# Patient Record
Sex: MALE | Race: WHITE | NOT HISPANIC OR LATINO | Employment: OTHER | URBAN - METROPOLITAN AREA
[De-identification: names, ages, dates, MRNs, and addresses within clinical notes are randomized per-mention and may not be internally consistent; named-entity substitution may affect disease eponyms.]

---

## 2017-01-23 ENCOUNTER — ALLSCRIPTS OFFICE VISIT (OUTPATIENT)
Dept: OTHER | Facility: OTHER | Age: 82
End: 2017-01-23

## 2017-04-12 ENCOUNTER — GENERIC CONVERSION - ENCOUNTER (OUTPATIENT)
Dept: OTHER | Facility: OTHER | Age: 82
End: 2017-04-12

## 2017-06-08 ENCOUNTER — ALLSCRIPTS OFFICE VISIT (OUTPATIENT)
Dept: OTHER | Facility: OTHER | Age: 82
End: 2017-06-08

## 2017-07-20 ENCOUNTER — ALLSCRIPTS OFFICE VISIT (OUTPATIENT)
Dept: OTHER | Facility: OTHER | Age: 82
End: 2017-07-20

## 2017-08-10 ENCOUNTER — ALLSCRIPTS OFFICE VISIT (OUTPATIENT)
Dept: OTHER | Facility: OTHER | Age: 82
End: 2017-08-10

## 2017-08-10 DIAGNOSIS — I27.29 OTHER SECONDARY PULMONARY HYPERTENSION (HCC): ICD-10-CM

## 2017-08-10 DIAGNOSIS — E78.00 PURE HYPERCHOLESTEROLEMIA: ICD-10-CM

## 2017-08-11 ENCOUNTER — GENERIC CONVERSION - ENCOUNTER (OUTPATIENT)
Dept: OTHER | Facility: OTHER | Age: 82
End: 2017-08-11

## 2017-08-11 ENCOUNTER — TRANSCRIBE ORDERS (OUTPATIENT)
Dept: ADMINISTRATIVE | Facility: HOSPITAL | Age: 82
End: 2017-08-11

## 2017-08-11 ENCOUNTER — APPOINTMENT (OUTPATIENT)
Dept: LAB | Facility: HOSPITAL | Age: 82
End: 2017-08-11
Attending: INTERNAL MEDICINE
Payer: MEDICARE

## 2017-08-11 DIAGNOSIS — I27.29 OTHER SECONDARY PULMONARY HYPERTENSION (HCC): ICD-10-CM

## 2017-08-11 DIAGNOSIS — E78.00 PURE HYPERCHOLESTEROLEMIA: ICD-10-CM

## 2017-08-11 LAB
ALBUMIN SERPL BCP-MCNC: 4 G/DL (ref 3.5–5)
ALP SERPL-CCNC: 55 U/L (ref 46–116)
ALT SERPL W P-5'-P-CCNC: 23 U/L (ref 12–78)
ANION GAP SERPL CALCULATED.3IONS-SCNC: 7 MMOL/L (ref 4–13)
AST SERPL W P-5'-P-CCNC: 16 U/L (ref 5–45)
BILIRUB SERPL-MCNC: 0.8 MG/DL (ref 0.2–1)
BUN SERPL-MCNC: 25 MG/DL (ref 5–25)
CALCIUM SERPL-MCNC: 8.8 MG/DL (ref 8.3–10.1)
CHLORIDE SERPL-SCNC: 102 MMOL/L (ref 100–108)
CHOLEST SERPL-MCNC: 174 MG/DL (ref 50–200)
CO2 SERPL-SCNC: 29 MMOL/L (ref 21–32)
CREAT SERPL-MCNC: 1.16 MG/DL (ref 0.6–1.3)
GFR SERPL CREATININE-BSD FRML MDRD: 56 ML/MIN/1.73SQ M
GLUCOSE P FAST SERPL-MCNC: 92 MG/DL (ref 65–99)
HDLC SERPL-MCNC: 39 MG/DL (ref 40–60)
LDLC SERPL CALC-MCNC: 121 MG/DL (ref 0–100)
POTASSIUM SERPL-SCNC: 5.1 MMOL/L (ref 3.5–5.3)
PROT SERPL-MCNC: 7.2 G/DL (ref 6.4–8.2)
SODIUM SERPL-SCNC: 138 MMOL/L (ref 136–145)
TRIGL SERPL-MCNC: 69 MG/DL

## 2017-08-11 PROCEDURE — 80053 COMPREHEN METABOLIC PANEL: CPT

## 2017-08-11 PROCEDURE — 36415 COLL VENOUS BLD VENIPUNCTURE: CPT

## 2017-08-11 PROCEDURE — 80061 LIPID PANEL: CPT

## 2017-09-11 ENCOUNTER — ALLSCRIPTS OFFICE VISIT (OUTPATIENT)
Dept: OTHER | Facility: OTHER | Age: 82
End: 2017-09-11

## 2017-09-20 DIAGNOSIS — E78.00 PURE HYPERCHOLESTEROLEMIA: ICD-10-CM

## 2017-11-16 ENCOUNTER — ALLSCRIPTS OFFICE VISIT (OUTPATIENT)
Dept: OTHER | Facility: OTHER | Age: 82
End: 2017-11-16

## 2017-12-12 ENCOUNTER — GENERIC CONVERSION - ENCOUNTER (OUTPATIENT)
Dept: OTHER | Facility: OTHER | Age: 82
End: 2017-12-12

## 2017-12-12 ENCOUNTER — ALLSCRIPTS OFFICE VISIT (OUTPATIENT)
Dept: OTHER | Facility: OTHER | Age: 82
End: 2017-12-12

## 2018-01-12 VITALS
TEMPERATURE: 94.4 F | BODY MASS INDEX: 25.43 KG/M2 | OXYGEN SATURATION: 92 % | DIASTOLIC BLOOD PRESSURE: 72 MMHG | HEIGHT: 74 IN | HEART RATE: 73 BPM | SYSTOLIC BLOOD PRESSURE: 124 MMHG | WEIGHT: 198.13 LBS

## 2018-01-14 VITALS
HEART RATE: 61 BPM | WEIGHT: 196 LBS | DIASTOLIC BLOOD PRESSURE: 70 MMHG | BODY MASS INDEX: 25.15 KG/M2 | HEIGHT: 74 IN | SYSTOLIC BLOOD PRESSURE: 130 MMHG

## 2018-01-14 VITALS
WEIGHT: 197.13 LBS | BODY MASS INDEX: 25.3 KG/M2 | HEART RATE: 79 BPM | OXYGEN SATURATION: 98 % | DIASTOLIC BLOOD PRESSURE: 72 MMHG | TEMPERATURE: 96.4 F | SYSTOLIC BLOOD PRESSURE: 120 MMHG | HEIGHT: 74 IN

## 2018-01-14 VITALS
HEIGHT: 74 IN | DIASTOLIC BLOOD PRESSURE: 78 MMHG | SYSTOLIC BLOOD PRESSURE: 130 MMHG | HEART RATE: 60 BPM | WEIGHT: 201.13 LBS | BODY MASS INDEX: 25.81 KG/M2
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
## 2018-01-15 NOTE — RESULT NOTES
Verified Results  (1) LIPID PANEL FASTING W DIRECT LDL REFLEX [id number] 08:45AM Baljit Corado   TW Order Number: [id number]     Test Name Result Flag Reference   CHOLESTEROL 174 mg/dL     LDL CHOLESTEROL CALCULATED 121 mg/dL H 0-100   Triglyceride:        Normal ??? ??? ??? ??? ??? ??? ??? <150 mg/dl   ??? ??? ???Borderline High ??? ??? 150-199 mg/dl   ??? ??? ? ?? High ??? ??? ??? ??? ??? ??? ??? 200-499 mg/dl   ??? ??? ? ??Very High ??? ??? ??? ??? ??? >499 mg/dl      Cholesterol:       Desirable ??? ??? ??? ??? <200 mg/dl   ??? ??? Borderline High ??? 200-239 mg/dl   ??? ??? High ??? ??? ??? ??? ??? ??? >239 mg/dl      HDL Cholesterol:       High ??? ???>59 mg/dL   ??? ??? Low ??? ??? <41 mg/dL      HDL Cholesterol:       High ??? ???>59 mg/dL   ??? ??? Low ??? ??? <41 mg/dL      This screening LDL is a calculated result  It does not have the accuracy of the Direct Measured LDL in the monitoring of patients with hyperlipidemia and/or statin therapy  Direct Measure LDL (TTU742) must be ordered separately in these patients  TRIGLYCERIDES 69 mg/dL  <=150   Specimen collection should occur prior to N-Acetylcysteine or Metamizole administration due to the potential for falsely depressed results  HDL,DIRECT 39 mg/dL L 40-60   Specimen collection should occur prior to Metamizole administration due to the potential for falsley depressed results

## 2018-01-16 NOTE — MISCELLANEOUS
Provider Comments  Provider Comments:   no show for appt      Signatures   Electronically signed by : Theodore Linares DO;  Apr 12 2017  9:43AM EST                       (Author)
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
## 2018-03-07 NOTE — PROGRESS NOTES
"  Discussion/Summary  Normal device function      Results/Data  Cardiac Device In Clinic [id number] 01:44PM [place]     Test Name Result Flag Reference   MISCELLANEOUS COMMENT (Report)     DEVICE INTERROGATED IN [facility]: BATTERY VOLTAGE ADEQUATE (6 8 YR)  AP 0%  > 99% (>40%/DEPENDENT)  ALL AVAILABLE LEAD PARAMETERS WITHIN NORMAL LIMITS  NO SIGNIFICANT HIGH RATE EPISODES  MODE REPROGRAMMED TO VVIR FOR CHRONIC AF   NO OTHER PROGRAMMING CHANGES MADE TO DEVICE PARAMETERS  NORMAL PACEMAKER FUNCTION  RG   Cardiac Electrophysiology Report      wkihmwflvti[id number](R)-[id number]  pdf   DEVICE TYPE Pacemaker       Cardiac Electrophysiology Report [id number] 01:44PM [facility] [place]     Test Name Result Flag Reference   Cardiac Electrophysiology Report      ymgwgnqpjvuhqe[id number]  pdf     Signatures   Electronically signed by : Connor Pineda, ; Jun 8 2017 10:40AM EST                       (Author)    Electronically signed by : YAYA Sauceda ; Jun 8 2017 12:56PM EST                       (Author)    "

## 2018-03-07 NOTE — PROGRESS NOTES
"  Discussion/Summary  Normal device function      Results/Data  Results   Cardiac Device In Clinic [id number] 08:56PM Neita [facility]     Test Name Result Flag Reference   MISCELLANEOUS COMMENT (Report)     DEVICE INTERROGATED IN [facility]: **NONBILLABLE** REPROGRAMMING ONLY - RATE RESPONSE TURNED "ON" FROM PASSIVE (DDDR/DDIR) PER DR CHAMBERS (NO TESTING); PT SEEN IN OFFICE TODAY BY DR CHAMBERS ; [id number] VOLTAGE ADEQUATE (7 8 YRS); AP = 8 6%,  = 94%; ATRIAL FIBRILLATION NOTED - PT REFUSES ANTICOAGULATION - TAKES  MG, KRILL OIL, METOPROLOL ; TOTAL AT/AF BURDEN @ 22%; ALL LEAD PARAMETERS APPEAR WITHIN NORMAL LIMITS; NORMAL DEVICE FUNCTION  eb   Cardiac Electrophysiology Report      mldrgtqonqp[id number](R)--[id number]  pdf   DEVICE TYPE Pacemaker       Cardiac Electrophysiology Report [id number] 08:56PM [facility]     Test Name Result Flag Reference   Cardiac Electrophysiology Report      nrhhoetjfpzuvjdpnlcmptqzbg8qgsm0t81wa9v27e2l15sx[id number]  pdf     Signatures   Electronically signed by : Whitney Bales, ; Eric 15 2016  8:36AM EST                       (Author)    Electronically signed by : Blossom Bajwa DO; Jan 17 2016  3:18PM EST                       (Author)    "

## 2018-03-07 NOTE — PROGRESS NOTES
"  Discussion/Summary  Normal device function     Persistent A fib, all V paced  Results/Data  Cardiac Device Remote 31Oct2016 06:51AM Arty Cutter     Test Name Result Flag Reference   MISCELLANEOUS COMMENT      [facility] TRANSMISSION: BATTERY STATUS "OK"  AP 0%  94%  100% IN MODE SWITCH  ALL AVAILABLE LEAD PARAMETERS WITHIN NORMAL LIMITS  NORMAL DEVICE FUNCTION  NC   Cardiac Electrophysiology Report      vlqrvziaetzayfgvjxhlsltdri4hfdr2n88av7[id number]  pdf   DEVICE TYPE Pacemaker       Cardiac Electrophysiology Report [id number] 06:51AM Arty Cutter     Test Name Result Flag Reference   Cardiac Electrophysiology Report      ibrvydfasax[id number]  pdf     Signatures   Electronically signed by : Rudi Vyas, ; Nov 8 2016 10:04AM EST                       (Author)    Electronically signed by : YAYA Godinez ; Nov 11 2016  1:27PM EST                       (Author)    "

## 2018-03-07 NOTE — PROGRESS NOTES
"  Results/Data  Results   Cardiac Device Remote [id number] 07:15AM Vinayak Soler     Test Name Result Flag Reference   MISCELLANEOUS COMMENT (Report)     [facility] TRANSMISSION: BATTERY VOLTAGE ADEQUATE  (7 1 YRS)  NO SIGNIFICANT HIGH RATE EPISODES  100% BURDEN  PATIENT IS NOT ON ANTICOAGULANTS (REFUSES)  ONLY ON ASA  ALL AVAILABLE LEAD PARAMETERS WITHIN NORMAL LIMITS  AP 0%  97%  NORMAL DEVICE FUNCTION  ---BALES   Cardiac Electrophysiology Report      lnewgkinkzqqehnvuo[id number]  pdf   DEVICE TYPE Pacemaker       Cardiac Electrophysiology Report [id number] 07:15AM Vinayak Soler     Test Name Result Flag Reference   Cardiac Electrophysiology Report      dvmrrfzkq[id number] pdf     Signatures   Electronically signed by : Carrie Guajardo, ; Jan 28 2016  9:18AM EST                       (Author)    Electronically signed by : Jaime Moss DO; Jan 30 2016  8:09PM EST                       (Author)    "

## 2018-03-07 NOTE — PROGRESS NOTES
"  Discussion/Summary  Normal device function      Results/Data  Cardiac Device Remote [id number] 06:19AM Kathi Nicholson     Test Name Result Flag Reference   MISCELLANEOUS COMMENT (Report)     [facility] TRANSMISSION: BATTERY VOLTAGE ADEQUATE (7 5-8 5 YRS)  AP-0%, -94% (DEPENDENT/AV BLOCK)  NO SIGNIFICANT HIGH RATE EPISODES  PT IN AF & HAS REFUSED FULL ANTICOAGULATION  PT ON ASA 325MG  AF BURDEN>99% SINCE 4/28/16  ALL AVAILABLE LEAD PARAMETERS WITHIN NORMAL LIMITS  NORMAL DEVICE FUNCTION  GV   Cardiac Electrophysiology Report      xygyifruz[id number]  pdf   DEVICE TYPE Pacemaker       Cardiac Electrophysiology Report [id number] 06:19AM Kathi Nicholson     Test Name Result Flag Reference   Cardiac Electrophysiology Report      [id number]  pdf     Signatures   Electronically signed by : Fozia Arrington RN; Aug  1 2016  1:58PM EST                       (Author)    Electronically signed by : Jose Manuel Aguilar DO; Aug  1 2016  3:47PM EST                       (Author)    "

## 2018-03-07 NOTE — PROGRESS NOTES
"  Discussion/Summary  Normal device function      Results/Data  Results   Cardiac Device In Clinic 28Apr2016 05:33PM Elda Bruno     Test Name Result Flag Reference   MISCELLANEOUS COMMENT (Report)     DEVICE INTERROGATED IN [facility]: BATTERY VOLTAGE ADEQUATE (7 4 YRS)  AP 0%  98%  ALL AVAILABLE LEAD PARAMETERS WITHIN NORMAL LIMITS  1 AMS EPISODE (ONGOING SINCE 1/14/16) = AFIB  PT TAKES 325 MG ASA  REFUSES AC  NO OTHER SIGNIFICANT HIGH RATE EPISODES  NO PROGRAMMING CHANGES MADE TO DEVICE PARAMETERS  PACEMAKER FUNCTIONING APPROPRIATELY  TASKED TO DR Rufino Walls FOR PERSISTENT AFIB  PACEMAKER FUNCTIONING APPROPRIATELY  CP   Cardiac Electrophysiology Report      [id number](R)--[id number]  pdf   DEVICE TYPE Pacemaker       Cardiac Electrophysiology Report [id number] 05:33PM Elda Sanchezroy     Test Name Result Flag Reference   Cardiac Electrophysiology Report      dcmi[id number]  pdf     Signatures   Electronically signed by : Doroteo Lawrence, ; Apr 28 2016  1:48PM EST                       (Author)    Electronically signed by : Graeme Clemente DO;  Apr 30 2016 10:25AM EST                       (Author)    "

## 2018-03-07 NOTE — PROGRESS NOTES
"  Discussion/Summary  Normal device function     Persistent A fib  dependent     refused anticoagulation  Results/Data  Cardiac Device Remote 11Sep2017 06:41AM Sulema First     Test Name Result Flag Reference   MISCELLANEOUS COMMENT (Report)     [facility] TRANSMISSION: BATTERY VOLTAGE ADEQUATE (8 8-9 3 YRS)  -96% (DEPENDENT/AV BLOCK)  ALL AVAILABLE LEAD PARAMETERS WITHIN NORMAL LIMITS  NO SIGNIFICANT HIGH RATE EPISODES  PT IN AF & HAS REFUSED FULL ANTICOAGULATION  PT ON ASA 325MG  NORMAL DEVICE FUNCTION  [facility]   Cardiac Electrophysiology Report      [id number]  pdf   DEVICE TYPE Pacemaker       Cardiac Electrophysiology Report [id number] 06:41AM Sulema First     Test Name Result Flag Reference   Cardiac Electrophysiology Report      [id number]  pdf     Signatures   Electronically signed by : Yamilka Oleary RN; Sep 13 2017  3:34PM EST                       (Author)    Electronically signed by : YAYA Sehpard ; Sep 17 2017 12:31PM EST                       (Author)    "

## 2018-03-07 NOTE — PROGRESS NOTES
"  Discussion/Summary  Normal device function      Results/Data  Cardiac Device Remote [id number] 07:00AM Quinlianne Bliss     Test Name Result Flag Reference   MISCELLANEOUS COMMENT      [facility] TRANSMISSION: BATTERY STATUS "OK"   96%  ALL AVAILABLE LEAD PARAMETERS WITHIN NORMAL LIMITS  NO SIGNIFICANT HIGH RATE EPISODES  NORMAL DEVICE FUNCTION  NC   Cardiac Electrophysiology Report      [id number]  pdf   DEVICE TYPE Pacemaker       Cardiac Electrophysiology Report [id number] 07:00AM Quinlianne Bliss     Test Name Result Flag Reference   Cardiac Electrophysiology Report      [id number]  pdf     Signatures   Electronically signed by : Deepti Flores, ; Dec 19 2017  1:53PM EST                       (Author)    Electronically signed by : YAYA Wade ; Dec 19 2017  7:00PM EST                       (Author)    "

## 2018-03-13 ENCOUNTER — CLINICAL SUPPORT (OUTPATIENT)
Dept: CARDIOLOGY CLINIC | Facility: CLINIC | Age: 83
End: 2018-03-13
Payer: MEDICARE

## 2018-03-13 DIAGNOSIS — I48.19 PERSISTENT ATRIAL FIBRILLATION (HCC): ICD-10-CM

## 2018-03-13 DIAGNOSIS — Z95.0 CARDIAC PACEMAKER IN SITU: ICD-10-CM

## 2018-03-13 DIAGNOSIS — I44.2 ATRIOVENTRICULAR BLOCK, COMPLETE (HCC): Primary | ICD-10-CM

## 2018-03-13 PROCEDURE — 93294 REM INTERROG EVL PM/LDLS PM: CPT | Performed by: INTERNAL MEDICINE

## 2018-03-13 PROCEDURE — 93296 REM INTERROG EVL PM/IDS: CPT | Performed by: INTERNAL MEDICINE

## 2018-03-14 NOTE — PROGRESS NOTES
PM  [facility] TRANSMISSION: BATTERY STATUS "OK"   98%  ALL AVAILABLE LEAD PARAMETERS WITHIN NORMAL LIMITS  NO SIGNIFICANT HIGH RATE EPISODES  NORMAL DEVICE FUNCTION   NC

## 2018-05-17 RX ORDER — BIOTIN 1 MG
1 TABLET ORAL DAILY
COMMUNITY

## 2018-05-17 RX ORDER — TAMSULOSIN HYDROCHLORIDE 0.4 MG/1
0.4 CAPSULE ORAL DAILY
Refills: 3 | COMMUNITY
Start: 2018-03-22 | End: 2019-01-21 | Stop reason: SDUPTHER

## 2018-05-17 RX ORDER — METOPROLOL SUCCINATE 50 MG/1
75 TABLET, EXTENDED RELEASE ORAL DAILY
Refills: 3 | COMMUNITY
Start: 2018-02-25 | End: 2018-08-23 | Stop reason: SDUPTHER

## 2018-05-17 RX ORDER — UBIDECARENONE 75 MG
1 CAPSULE ORAL DAILY
COMMUNITY

## 2018-05-17 RX ORDER — UBIDECARENONE 50 MG
CAPSULE ORAL DAILY
COMMUNITY

## 2018-05-17 RX ORDER — ASPIRIN 325 MG
1 TABLET ORAL DAILY
COMMUNITY

## 2018-05-17 RX ORDER — MULTIVIT WITH MINERALS/LUTEIN
1 TABLET ORAL DAILY
COMMUNITY

## 2018-05-17 RX ORDER — BACLOFEN 10 MG/1
1 TABLET ORAL EVERY 12 HOURS
COMMUNITY
Start: 2018-01-22 | End: 2018-06-22 | Stop reason: CLARIF

## 2018-05-22 ENCOUNTER — OFFICE VISIT (OUTPATIENT)
Dept: INTERNAL MEDICINE CLINIC | Facility: CLINIC | Age: 83
End: 2018-05-22
Payer: MEDICARE

## 2018-05-22 VITALS
TEMPERATURE: 94.8 F | HEART RATE: 59 BPM | OXYGEN SATURATION: 98 % | BODY MASS INDEX: 26.37 KG/M2 | HEIGHT: 74 IN | WEIGHT: 205.5 LBS

## 2018-05-22 DIAGNOSIS — M19.90 ARTHRITIS: ICD-10-CM

## 2018-05-22 DIAGNOSIS — I48.20 CHRONIC ATRIAL FIBRILLATION (HCC): Primary | ICD-10-CM

## 2018-05-22 DIAGNOSIS — H92.01 RIGHT EAR PAIN: ICD-10-CM

## 2018-05-22 PROBLEM — M54.50 LOWER BACK PAIN: Status: ACTIVE | Noted: 2018-01-22

## 2018-05-22 PROCEDURE — 99214 OFFICE O/P EST MOD 30 MIN: CPT | Performed by: INTERNAL MEDICINE

## 2018-05-22 NOTE — PROGRESS NOTES
Assessment/Plan:      Diagnoses and all orders for this visit:    Chronic atrial fibrillation ([address] )  Pt has appt with Dr Jackson Jacob in June - no sxs and remains active at home    Right ear pain  ?sinus fluid but not infected Recommend claritin prn  And increase fluids    Arthritis  Pt eats dried cherries and will use Tylenol prn    Other orders  -     aspirin 325 mg tablet; Take 1 tablet by mouth daily  -     baclofen 10 mg tablet; Take 1 tablet by mouth every 12 (twelve) hours  -     fluocinonide (LIDEX) 0 05 % cream; Apply topically  -     metoprolol succinate (TOPROL-XL) 50 mg 24 hr tablet; Take 75 mg by mouth daily  -     Red Yeast Rice 600 MG TABS; Take by mouth daily  -     tamsulosin (FLOMAX) 0 4 mg; Take 0 4 mg by mouth daily  -     cyanocobalamin (VITAMIN B-12) 100 mcg tablet; Take 1 tablet by mouth daily  -     Ascorbic Acid (VITAMIN C) 1000 MG tablet; Take 1 tablet by mouth daily  -     Cholecalciferol (VITAMIN D3) 1000 units CAPS; Take 1 tablet by mouth daily    Rto 4 months         Patient ID: Kaykay Hidalgo is a 80 y o  male  HPI   Pt doing generally well  He turned 80 recently  He does have right ear pain for past 2 days  No drainage or hearing loss  No falls  No chest pain or sob  Review of Systems   Constitutional: Negative  HENT: Positive for ear pain  Eyes: Negative  Respiratory: Negative  Cardiovascular: Negative  Endocrine: Negative  Genitourinary: Negative  Musculoskeletal: Negative  Skin: Negative  Allergic/Immunologic: Negative  Neurological: Negative  Hematological: Negative  Psychiatric/Behavioral: Negative  Vitals:    05/22/18 0816   Pulse: 59   Temp: (!) 94 8 °F (34 9 °C)   TempSrc: Tympanic   SpO2: 98%   Weight: 93 2 kg (205 lb 8 oz)   Height: 6' 2" (1 88 m)   /70       Physical Exam   Constitutional: He is oriented to person, place, and time  He appears well-developed and well-nourished  No distress     HENT:   Head: Normocephalic and atraumatic  Right Ear: External ear normal    Left Ear: External ear normal    Nose: Nose normal    Mouth/Throat: Oropharynx is clear and moist  No oropharyngeal exudate  Eyes: Conjunctivae and EOM are normal  Pupils are equal, round, and reactive to light  No scleral icterus  Neck: Normal range of motion  Neck supple  No JVD present  Cardiovascular: Normal rate, normal heart sounds and intact distal pulses  irreg irreg   Pulmonary/Chest: Effort normal and breath sounds normal  No respiratory distress  He has no wheezes  He has no rales  He exhibits no tenderness  Abdominal: Soft  Bowel sounds are normal  He exhibits no distension and no mass  There is no tenderness  There is no rebound and no guarding  Musculoskeletal: Normal range of motion  He exhibits no edema, tenderness or deformity  Lymphadenopathy:     He has no cervical adenopathy  Neurological: He is alert and oriented to person, place, and time  He has normal reflexes  He displays normal reflexes  No cranial nerve deficit  He exhibits normal muscle tone  Coordination normal    Skin: Skin is warm and dry  No rash noted  He is not diaphoretic  No erythema  No pallor  Psychiatric: He has a normal mood and affect  His behavior is normal  Judgment and thought content normal    Nursing note and vitals reviewed

## 2018-06-15 ENCOUNTER — REMOTE DEVICE CLINIC VISIT (OUTPATIENT)
Dept: CARDIOLOGY CLINIC | Facility: CLINIC | Age: 83
End: 2018-06-15
Payer: MEDICARE

## 2018-06-15 DIAGNOSIS — I44.2 AV BLOCK, COMPLETE (HCC): Primary | ICD-10-CM

## 2018-06-15 DIAGNOSIS — I48.19 PERSISTENT ATRIAL FIBRILLATION (HCC): ICD-10-CM

## 2018-06-15 DIAGNOSIS — Z95.0 CARDIAC PACEMAKER IN SITU: ICD-10-CM

## 2018-06-15 PROCEDURE — 93294 REM INTERROG EVL PM/LDLS PM: CPT

## 2018-06-15 PROCEDURE — 93296 REM INTERROG EVL PM/IDS: CPT

## 2018-06-18 NOTE — PROGRESS NOTES
Results for orders placed or performed in visit on 06/15/18   Cardiac EP device report    Narrative    [facility] DUAL PM  MERLIN TRANSMISSION: BATTERY VOLTAGE ADEQUATE (7 7-8 2 YRS)  -96% (DEPENDENT/AV BLOCK)  ALL AVAILABLE LEAD PARAMETERS WITHIN NORMAL LIMITS  NO SIGNIFICANT HIGH RATE EPISODES  NORMAL DEVICE FUNCTION   GV

## 2018-06-22 ENCOUNTER — OFFICE VISIT (OUTPATIENT)
Dept: CARDIOLOGY CLINIC | Facility: HOSPITAL | Age: 83
End: 2018-06-22
Payer: MEDICARE

## 2018-06-22 VITALS
DIASTOLIC BLOOD PRESSURE: 68 MMHG | HEART RATE: 60 BPM | WEIGHT: 200.2 LBS | SYSTOLIC BLOOD PRESSURE: 150 MMHG | BODY MASS INDEX: 25.69 KG/M2 | HEIGHT: 74 IN

## 2018-06-22 DIAGNOSIS — I48.21 PERMANENT ATRIAL FIBRILLATION (HCC): ICD-10-CM

## 2018-06-22 DIAGNOSIS — I47.2 PAROXYSMAL VENTRICULAR TACHYCARDIA (HCC): Primary | ICD-10-CM

## 2018-06-22 DIAGNOSIS — E78.00 HYPERCHOLESTEROLEMIA: ICD-10-CM

## 2018-06-22 PROCEDURE — 99213 OFFICE O/P EST LOW 20 MIN: CPT | Performed by: INTERNAL MEDICINE

## 2018-06-22 PROCEDURE — 93000 ELECTROCARDIOGRAM COMPLETE: CPT | Performed by: INTERNAL MEDICINE

## 2018-06-22 NOTE — PROGRESS NOTES
Cardiology Follow Up    Patricia Watson  5/14/1928  [id number]  [address] [facility]  [address] [place] [address] [address]    No diagnosis found  I had the pleasure of seeing Patricia Watson for a follow up visit  Interval History: none    History of the presenting illness, Discussion/Summary and My Plan are as follows:    He is a pleasant 80 yr old male with a permanent pacemaker ([address] PPM)-  implantation in October 2013 for sinus rhythm with complete heart block  ( 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)  After discharge he has done well without any symptoms  His pacemaker interrogation revealed episodes of rate controlled paroxysmal atrial fibrillation and subsequently Non-sustained Ventricular Tachycardia that subsided on beta-blockade  I also initiated him on a full dose aspirin a day  His chads 2 score is one for age  His CHADSVASC score would be 2  He is functional gentleman leading an active life and might benefit from more aggressive anticoagulation with Coumadin or one of the newer agents  This was discussed with him in detail at every visit  However, he would like to take ASA alone  He also discontinued his simvastatin and is now taking Red Yeast Rice  Remains active without symptoms    Plan:    Status post dual-chamber pacemaker - [employer] device: Last interrogation with normal function - March 2018, VVI now  No recent high rate episodes  Episodes of AFib with heart rate control has been good  Heart rate did not  with exercise (during stress test) rate responsiveness now at medium  Remains very asymptomatic at very good levels of exertion  Atrial fibrillation: Continue metoprolol continue high-dose aspirin  Does not want any further aggressive anticoagulation Either with Coumadin or any of the newer anticoagulants    Discussed with him   Rates have been controlled  Dyslipidemia: last LDL of 116 in October 2016, He was on simvastatin-he now change this to red yeast rice  Check lipids  Non-sustained Ventricular tachycardia: Neg echo and stres test for ischemia  Increased Metoprolol to 75 mg a day, Remains completely asymptomatic  No recent episodes on most recent Pacemaker interrogation    I will see him in followup in 9 months  Patient Active Problem List   Diagnosis    Arthritis    Atrial fibrillation ([address] )    Benign prostatic hyperplasia    Diverticulosis    Hypercholesterolemia    Lower back pain    Paroxysmal ventricular tachycardia (HCC)    Pulmonary artery hypertension (HCC)    RBBB    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    Right ear pain     Past Medical History:   Diagnosis Date    Arthritis     Atrial fibrillation (HCC)     Last Assessed:8/10/17    Benign prostatic hyperplasia     Last Assessed:1/28/14    Complete atrioventricular block ([address] )     Diverticulosis     Last Assessed:4/30/13    Hypercholesterolemia     Paroxysmal ventricular tachycardia (HCC)     Last Assessed:7/20/17    Prostatitis     Last Assessed:12/19/12    Pulmonary artery hypertension (HCC)     mild pulmonary htn    Right bundle branch block     Last Assessed:11/26/13    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     Last IXOZCDTFW:81/3/41     Social History     Social History    Marital status:       Spouse name: N/A    Number of children: N/A    Years of education: N/A     Occupational History    retired      Social History Main Topics    Smoking status: Former Smoker    Smokeless tobacco: Never Used    Alcohol use Yes      Comment: social drinker    Drug use: No    Sexual activity: Not on file     Other Topics Concern    Not on file     Social History Narrative    Advanced directive discussed with patient    Always uses seatbelt    Always uses sunscreen    Caffeine use    Regular dental care          Family History   Problem Relation Age of Onset    Hypertension Mother     Stroke Mother         Stroke Syndrome    Other Sister         pacemaker placement    Prostate cancer Brother     Diabetes Family      Past Surgical History:   Procedure Laterality Date    CARDIAC PACEMAKER PLACEMENT      [employer] PPM    COLONOSCOPY      HEMORRHOID SURGERY      Cauterization of hemorrhoids    HERNIA REPAIR      INCISION AND DRAINAGE ABSCESS / HEMATOMA OF Casey Zamora / [place] [employer] / [address]      Fatty Tumor Removed    KNEE SURGERY Left     Steel Removes from left knee       Current Outpatient Prescriptions:     Ascorbic Acid (VITAMIN C) 1000 MG tablet, Take 1 tablet by mouth daily, Disp: , Rfl:     aspirin 325 mg tablet, Take 1 tablet by mouth daily, Disp: , Rfl:     Cholecalciferol (VITAMIN D3) 1000 units CAPS, Take 1 tablet by mouth daily, Disp: , Rfl:     cyanocobalamin (VITAMIN B-12) 100 mcg tablet, Take 1 tablet by mouth daily, Disp: , Rfl:     fluocinonide (LIDEX) 0 05 % cream, Apply topically, Disp: , Rfl:     metoprolol succinate (TOPROL-XL) 50 mg 24 hr tablet, Take 75 mg by mouth daily, Disp: , Rfl: 3    Red Yeast Rice 600 MG TABS, Take by mouth daily, Disp: , Rfl:     tamsulosin (FLOMAX) 0 4 mg, Take 0 4 mg by mouth daily, Disp: , Rfl: 3  Allergies   Allergen Reactions    Ibuprofen Hives       Labs:not applicable  Imaging: No results found      Review of Systems:  Review of Systems    Physical Exam:  Physical Exam

## 2018-07-02 ENCOUNTER — OFFICE VISIT (OUTPATIENT)
Dept: URGENT CARE | Facility: CLINIC | Age: 83
End: 2018-07-02
Payer: MEDICARE

## 2018-07-02 VITALS
WEIGHT: 200 LBS | OXYGEN SATURATION: 97 % | RESPIRATION RATE: 20 BRPM | TEMPERATURE: 98 F | HEART RATE: 84 BPM | DIASTOLIC BLOOD PRESSURE: 60 MMHG | BODY MASS INDEX: 27.09 KG/M2 | HEIGHT: 72 IN | SYSTOLIC BLOOD PRESSURE: 118 MMHG

## 2018-07-02 DIAGNOSIS — J20.9 ACUTE BRONCHITIS, UNSPECIFIED ORGANISM: Primary | ICD-10-CM

## 2018-07-02 PROCEDURE — G0463 HOSPITAL OUTPT CLINIC VISIT: HCPCS | Performed by: PHYSICIAN ASSISTANT

## 2018-07-02 PROCEDURE — 99203 OFFICE O/P NEW LOW 30 MIN: CPT | Performed by: PHYSICIAN ASSISTANT

## 2018-07-02 RX ORDER — AZITHROMYCIN 250 MG/1
TABLET, FILM COATED ORAL
Qty: 6 TABLET | Refills: 0 | Status: SHIPPED | OUTPATIENT
Start: 2018-07-02 | End: 2018-07-06

## 2018-07-02 NOTE — PATIENT INSTRUCTIONS

## 2018-07-02 NOTE — PROGRESS NOTES
[facility]        NAME: Char Gonzales is a 80 y o  male  : 1928    MRN: [id number]  DATE: 2018  TIME: 8:21 AM    Assessment and Plan   Acute bronchitis, unspecified organism [J20 9]  1  Acute bronchitis, unspecified organism  azithromycin (ZITHROMAX) 250 mg tablet         Patient Instructions       Follow up with PCP in 3-5 days  Proceed to  ER if symptoms worsen  Chief Complaint     Chief Complaint   Patient presents with    Cough     Productive cough for 1 week         History of Present Illness       Patient presents with a productive cough for the past week  He states the mucus is yellow in color it is not associated any chest pain shortness of breath dyspnea on exertion fever or chills  He denies any sinus symptoms or allergies  He states he feels fine other than the productive cough  Review of Systems   Review of Systems   Constitutional: Negative for chills and fever  HENT: Negative for congestion, ear pain, postnasal drip, sinus pressure, sore throat and tinnitus  Eyes: Negative for redness  Respiratory: Positive for cough  Negative for chest tightness, shortness of breath and wheezing  Cardiovascular: Negative for chest pain and palpitations  Gastrointestinal: Negative for abdominal pain, diarrhea and nausea  Musculoskeletal: Negative for myalgias  Skin: Negative for rash  Neurological: Negative for dizziness  Hematological: Negative for adenopathy           Current Medications       Current Outpatient Prescriptions:     Ascorbic Acid (VITAMIN C) 1000 MG tablet, Take 1 tablet by mouth daily, Disp: , Rfl:     aspirin 325 mg tablet, Take 1 tablet by mouth daily, Disp: , Rfl:     azithromycin (ZITHROMAX) 250 mg tablet, Take 2 tablets today then 1 tablet daily x 4 days, Disp: 6 tablet, Rfl: 0    Cholecalciferol (VITAMIN D3) 1000 units CAPS, Take 1 tablet by mouth daily, Disp: , Rfl:     cyanocobalamin (VITAMIN B-12) 100 mcg tablet, Take 1 tablet by mouth daily, Disp: , Rfl:     metoprolol succinate (TOPROL-XL) 50 mg 24 hr tablet, Take 75 mg by mouth daily, Disp: , Rfl: 3    Red Yeast Rice 600 MG TABS, Take by mouth daily, Disp: , Rfl:     tamsulosin (FLOMAX) 0 4 mg, Take 0 4 mg by mouth daily, Disp: , Rfl: 3    Current Allergies     Allergies as of 07/02/2018 - Reviewed 07/02/2018   Allergen Reaction Noted    Ibuprofen Hives 07/09/2012            The following portions of the patient's history were reviewed and updated as appropriate: allergies, current medications, past family history, past medical history, past social history, past surgical history and problem list      Past Medical History:   Diagnosis Date    Arthritis     Atrial fibrillation (HCC)     Last Assessed:8/10/17    Benign prostatic hyperplasia     Last Assessed:1/28/14    BPH (benign prostatic hyperplasia)     Complete atrioventricular block ([address] )     Diverticulosis     Last Assessed:4/30/13    Hypercholesterolemia     Hypertension     Paroxysmal ventricular tachycardia (HCC)     Last Assessed:7/20/17    Prostatitis     Last Assessed:12/19/12    Pulmonary artery hypertension (HCC)     mild pulmonary htn    Right bundle branch block     Last Assessed:11/26/13    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     Last KBJUJQSUK:70/0/55       Past Surgical History:   Procedure Laterality Date    CARDIAC PACEMAKER PLACEMENT      [facility] PPM    COLONOSCOPY      HEMORRHOID SURGERY      Cauterization of hemorrhoids    HERNIA REPAIR      INCISION AND DRAINAGE ABSCESS / HEMATOMA OF Olga Heckler / [place] / THIGH      Fatty Tumor Removed    KNEE SURGERY Left     Steel Removes from left knee       Family History   Problem Relation Age of Onset    Hypertension Mother     Stroke Mother         Stroke Syndrome    Other Sister         pacemaker placement    Prostate cancer Brother     Diabetes Family          Medications have been verified          Objective   /60   Pulse 84   Temp 98 °F (36 7 °C) (Tympanic)   Resp 20   Ht 6' (1 829 m)   Wt 90 7 kg (200 lb)   SpO2 97%   BMI 27 12 kg/m²        Physical Exam     Physical Exam   Constitutional: He is oriented to person, place, and time  He appears well-developed and well-nourished  HENT:   Head: Normocephalic and atraumatic  Right Ear: External ear normal    Left Ear: External ear normal    Nose: Nose normal    Mouth/Throat: Oropharynx is clear and moist    Eyes: Conjunctivae are normal    Neck: Neck supple  Cardiovascular: Normal rate, regular rhythm and normal heart sounds  Pulmonary/Chest: Effort normal and breath sounds normal    Lymphadenopathy:     He has no cervical adenopathy  Neurological: He is alert and oriented to person, place, and time  Skin: Skin is warm and dry  No rash noted  Psychiatric: He has a normal mood and affect  His behavior is normal  Judgment and thought content normal    Nursing note and vitals reviewed

## 2018-08-23 ENCOUNTER — IN-CLINIC DEVICE VISIT (OUTPATIENT)
Dept: CARDIOLOGY CLINIC | Facility: HOSPITAL | Age: 83
End: 2018-08-23
Payer: MEDICARE

## 2018-08-23 DIAGNOSIS — I44.30 AVB (ATRIOVENTRICULAR BLOCK): ICD-10-CM

## 2018-08-23 DIAGNOSIS — I10 ESSENTIAL HYPERTENSION: Primary | ICD-10-CM

## 2018-08-23 DIAGNOSIS — Z95.0 PRESENCE OF CARDIAC PACEMAKER: ICD-10-CM

## 2018-08-23 DIAGNOSIS — I48.21 PERMANENT ATRIAL FIBRILLATION (HCC): Primary | ICD-10-CM

## 2018-08-23 PROCEDURE — 93279 PRGRMG DEV EVAL PM/LDLS PM: CPT | Performed by: INTERNAL MEDICINE

## 2018-08-23 RX ORDER — METOPROLOL SUCCINATE 50 MG/1
TABLET, EXTENDED RELEASE ORAL
Qty: 135 TABLET | Refills: 3 | Status: SHIPPED | OUTPATIENT
Start: 2018-08-23 | End: 2019-08-11 | Stop reason: SDUPTHER

## 2018-08-23 NOTE — PROGRESS NOTES
Results for orders placed or performed in visit on 08/23/18   Cardiac EP device report    Narrative    [facility] DUAL PM  DEVICE INTERROGATED IN THE [facility]: BATTERY VOLTAGE ADEQUATE (7 8 YRS)  : 97%  ALL LEAD PARAMETERS WITHIN NORMAL LIMITS  NO SIGNIFICANT HIGH RATE EPISODES  NO PROGRAMMING CHANGES MADE TO DEVICE PARAMETERS  PACEMAKER FUNCTIONING APPROPRIATELY    [address] [place] [address]

## 2018-09-26 ENCOUNTER — OFFICE VISIT (OUTPATIENT)
Dept: INTERNAL MEDICINE CLINIC | Facility: CLINIC | Age: 83
End: 2018-09-26
Payer: MEDICARE

## 2018-09-26 VITALS
HEIGHT: 72 IN | BODY MASS INDEX: 25.94 KG/M2 | WEIGHT: 191.5 LBS | HEART RATE: 49 BPM | OXYGEN SATURATION: 95 % | TEMPERATURE: 95.7 F

## 2018-09-26 DIAGNOSIS — I27.21 PULMONARY ARTERY HYPERTENSION (HCC): ICD-10-CM

## 2018-09-26 DIAGNOSIS — Z23 NEED FOR VACCINATION WITH 13-POLYVALENT PNEUMOCOCCAL CONJUGATE VACCINE: ICD-10-CM

## 2018-09-26 DIAGNOSIS — M19.90 ARTHRITIS: ICD-10-CM

## 2018-09-26 DIAGNOSIS — Z23 NEED FOR INFLUENZA VACCINATION: ICD-10-CM

## 2018-09-26 DIAGNOSIS — I48.20 CHRONIC ATRIAL FIBRILLATION (HCC): ICD-10-CM

## 2018-09-26 DIAGNOSIS — I10 ESSENTIAL HYPERTENSION: Primary | ICD-10-CM

## 2018-09-26 PROCEDURE — 90670 PCV13 VACCINE IM: CPT

## 2018-09-26 PROCEDURE — G0008 ADMIN INFLUENZA VIRUS VAC: HCPCS

## 2018-09-26 PROCEDURE — 99214 OFFICE O/P EST MOD 30 MIN: CPT | Performed by: INTERNAL MEDICINE

## 2018-09-26 PROCEDURE — 90662 IIV NO PRSV INCREASED AG IM: CPT

## 2018-09-26 PROCEDURE — G0009 ADMIN PNEUMOCOCCAL VACCINE: HCPCS

## 2018-09-26 NOTE — PROGRESS NOTES
Assessment/Plan:         Diagnoses and all orders for this visit:    Need for vaccination with 13-polyvalent pneumococcal conjugate vaccine  -     PNEUMOCOCCAL CONJUGATE VACCINE 13-VALENT GREATER THAN 6 MONTHS    Need for influenza vaccination  -     influenza vaccine, 2022-3254, high-dose, PF 0 5 mL, for patients 65 yr+ (FLUZONE HIGH-DOSE)           Patient ID: Barb Teague is a 80 y o  male  HPI   Pt doing generally overall  No falls  No chest pain or sob  No dizziness or lightheadedness  Appetite good  He turned 80 recently  Wants flu shot and prevnar today  No complaints  Cardio due in spring of next year  The following portions of the patient's history were reviewed and updated as appropriate:   Past Medical History:   Diagnosis Date    Arthritis     Atrial fibrillation (HCC)     Last Assessed:8/10/17    Benign prostatic hyperplasia     Last Assessed:1/28/14    BPH (benign prostatic hyperplasia)     Complete atrioventricular block (HCC)     Diverticulosis     Last Assessed:4/30/13    Hypercholesterolemia     Hypertension     Paroxysmal ventricular tachycardia (HCC)     Last Assessed:7/20/17    Prostatitis     Last Assessed:12/19/12    Pulmonary artery hypertension (HCC)     mild pulmonary htn    Right bundle branch block     Last Assessed:11/26/13    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     Last HFOXRJHVK:81/0/28     Past Surgical History:   Procedure Laterality Date    CARDIAC PACEMAKER PLACEMENT      St  [facility] DC PPM    COLONOSCOPY      HEMORRHOID SURGERY      Cauterization of hemorrhoids    HERNIA REPAIR      INCISION AND DRAINAGE ABSCESS / HEMATOMA OF [place] / KNEE / [address] [place] [address]      Fatty Tumor Removed    KNEE SURGERY Left     Steel Removes from left knee     Allergies   Allergen Reactions    Ibuprofen Hives     Social History     Social History    Marital status:       Spouse name: N/A    Number of children: N/A    Years of education: N/A     Occupational History    retired      Social History Main Topics    Smoking status: Former Smoker    Smokeless tobacco: Never Used    Alcohol use Yes      Comment: social drinker    Drug use: No    Sexual activity: Not on file     Other Topics Concern    Not on file     Social History Narrative    Advanced directive discussed with patient    Always uses seatbelt    Always uses sunscreen    Caffeine use    Regular dental care             Review of Systems   Constitutional: Negative  HENT: Negative  Eyes: Negative  Respiratory: Negative  Cardiovascular: Negative  Gastrointestinal: Negative  Endocrine: Negative  Genitourinary: Negative  Musculoskeletal: Negative  Skin: Negative  Allergic/Immunologic: Negative  Neurological: Negative  Hematological: Negative  Psychiatric/Behavioral: Negative  Pulse (!) 49   Temp (!) 95 7 °F (35 4 °C) (Tympanic)   Ht 6' (1 829 m)   Wt 86 9 kg (191 lb 8 oz)   SpO2 95%   BMI 25 97 kg/m²          Physical Exam   Constitutional: He is oriented to person, place, and time  He appears well-developed and well-nourished  No distress  HENT:   Head: Normocephalic and atraumatic  Right Ear: External ear normal    Left Ear: External ear normal    Nose: Nose normal    Mouth/Throat: Oropharynx is clear and moist  No oropharyngeal exudate  Eyes: Conjunctivae and EOM are normal  Pupils are equal, round, and reactive to light  No scleral icterus  Neck: Normal range of motion  Neck supple  No JVD present  Cardiovascular: Normal rate and intact distal pulses  Murmur heard  irreg irreg   Pulmonary/Chest: Effort normal and breath sounds normal    Abdominal: Soft  Bowel sounds are normal    Musculoskeletal: Normal range of motion  He exhibits deformity  He exhibits no edema or tenderness  Lymphadenopathy:     He has no cervical adenopathy  Neurological: He is alert and oriented to person, place, and time  He has normal reflexes   He displays normal reflexes  No cranial nerve deficit  He exhibits normal muscle tone  Coordination normal    Skin: Skin is warm and dry  He is not diaphoretic  Psychiatric: He has a normal mood and affect  His behavior is normal  Judgment and thought content normal    Nursing note and vitals reviewed

## 2018-11-21 DIAGNOSIS — H66.90 EAR INFECTION: Primary | ICD-10-CM

## 2018-11-27 ENCOUNTER — REMOTE DEVICE CLINIC VISIT (OUTPATIENT)
Dept: CARDIOLOGY CLINIC | Facility: CLINIC | Age: 83
End: 2018-11-27
Payer: MEDICARE

## 2018-11-27 DIAGNOSIS — Z95.0 PRESENCE OF PERMANENT CARDIAC PACEMAKER: ICD-10-CM

## 2018-11-27 DIAGNOSIS — I48.20 CHRONIC ATRIAL FIBRILLATION (HCC): Primary | ICD-10-CM

## 2018-11-27 DIAGNOSIS — I44.2 CHB (COMPLETE HEART BLOCK) (HCC): ICD-10-CM

## 2018-11-27 PROCEDURE — 93294 REM INTERROG EVL PM/LDLS PM: CPT | Performed by: INTERNAL MEDICINE

## 2018-11-27 PROCEDURE — 93296 REM INTERROG EVL PM/IDS: CPT | Performed by: INTERNAL MEDICINE

## 2018-11-27 NOTE — PROGRESS NOTES
[facility] DUAL PM ([address] 60)  MERLIN TRANSMISSION:  BATTERY VOLTAGE ADEQUATE (7 7 YR)     94% (CHB)   ALL LEAD PARAMETERS WITHIN NORMAL LIMITS   NO SIGNIFICANT HIGH RATE EPISODES   NORMAL DEVICE FUNCTION   RG

## 2019-01-16 ENCOUNTER — APPOINTMENT (OUTPATIENT)
Dept: LAB | Facility: HOSPITAL | Age: 84
End: 2019-01-16
Attending: INTERNAL MEDICINE
Payer: MEDICARE

## 2019-01-16 DIAGNOSIS — I10 ESSENTIAL HYPERTENSION: ICD-10-CM

## 2019-01-16 LAB
ALBUMIN SERPL BCP-MCNC: 3.8 G/DL (ref 3.5–5)
ALP SERPL-CCNC: 57 U/L (ref 46–116)
ALT SERPL W P-5'-P-CCNC: 31 U/L (ref 12–78)
ANION GAP SERPL CALCULATED.3IONS-SCNC: 8 MMOL/L (ref 4–13)
AST SERPL W P-5'-P-CCNC: 21 U/L (ref 5–45)
BILIRUB SERPL-MCNC: 0.8 MG/DL (ref 0.2–1)
BUN SERPL-MCNC: 22 MG/DL (ref 5–25)
CALCIUM SERPL-MCNC: 9.3 MG/DL (ref 8.3–10.1)
CHLORIDE SERPL-SCNC: 103 MMOL/L (ref 100–108)
CHOLEST SERPL-MCNC: 183 MG/DL (ref 50–200)
CO2 SERPL-SCNC: 28 MMOL/L (ref 21–32)
CREAT SERPL-MCNC: 1.15 MG/DL (ref 0.6–1.3)
GFR SERPL CREATININE-BSD FRML MDRD: 56 ML/MIN/1.73SQ M
GLUCOSE P FAST SERPL-MCNC: 97 MG/DL (ref 65–99)
HDLC SERPL-MCNC: 43 MG/DL (ref 40–60)
LDLC SERPL CALC-MCNC: 127 MG/DL (ref 0–100)
NONHDLC SERPL-MCNC: 140 MG/DL
POTASSIUM SERPL-SCNC: 4.6 MMOL/L (ref 3.5–5.3)
PROT SERPL-MCNC: 7.1 G/DL (ref 6.4–8.2)
SODIUM SERPL-SCNC: 139 MMOL/L (ref 136–145)
TRIGL SERPL-MCNC: 64 MG/DL

## 2019-01-16 PROCEDURE — 80053 COMPREHEN METABOLIC PANEL: CPT

## 2019-01-16 PROCEDURE — 36415 COLL VENOUS BLD VENIPUNCTURE: CPT

## 2019-01-16 PROCEDURE — 80061 LIPID PANEL: CPT

## 2019-01-21 DIAGNOSIS — N40.0 BENIGN PROSTATIC HYPERPLASIA, UNSPECIFIED WHETHER LOWER URINARY TRACT SYMPTOMS PRESENT: Primary | ICD-10-CM

## 2019-01-21 RX ORDER — TAMSULOSIN HYDROCHLORIDE 0.4 MG/1
0.4 CAPSULE ORAL DAILY
Qty: 90 CAPSULE | Refills: 3 | Status: SHIPPED | OUTPATIENT
Start: 2019-01-21 | End: 2020-01-15

## 2019-02-26 ENCOUNTER — REMOTE DEVICE CLINIC VISIT (OUTPATIENT)
Dept: CARDIOLOGY CLINIC | Facility: CLINIC | Age: 84
End: 2019-02-26
Payer: MEDICARE

## 2019-02-26 DIAGNOSIS — I44.2 CHB (COMPLETE HEART BLOCK) (HCC): ICD-10-CM

## 2019-02-26 DIAGNOSIS — Z95.0 PACEMAKER: Primary | ICD-10-CM

## 2019-02-26 PROCEDURE — 93296 REM INTERROG EVL PM/IDS: CPT | Performed by: INTERNAL MEDICINE

## 2019-02-26 PROCEDURE — 93294 REM INTERROG EVL PM/LDLS PM: CPT | Performed by: INTERNAL MEDICINE

## 2019-02-27 ENCOUNTER — TELEPHONE (OUTPATIENT)
Dept: INTERNAL MEDICINE CLINIC | Facility: CLINIC | Age: 84
End: 2019-02-27

## 2019-02-27 DIAGNOSIS — M25.562 PAIN IN BOTH KNEES, UNSPECIFIED CHRONICITY: ICD-10-CM

## 2019-02-27 DIAGNOSIS — M25.561 PAIN IN BOTH KNEES, UNSPECIFIED CHRONICITY: ICD-10-CM

## 2019-02-27 DIAGNOSIS — M19.90 ARTHRITIS: Primary | ICD-10-CM

## 2019-02-28 NOTE — PROGRESS NOTES
Results for orders placed or performed in visit on 02/26/19   Cardiac EP device report    Narrative    SJM-DUAL CHAMBER PPM ([address] 60)  MERLIN TRANSMISSION: BATTERY ADEQUATE (7 7 YRS)   93% (CHB/VVIR 60)  ALL LEAD PARAMETERS WITHIN NORMAL LIMITS  NO EPISODES  NORMAL DEVICE FUNCTION   PL

## 2019-03-28 ENCOUNTER — OFFICE VISIT (OUTPATIENT)
Dept: INTERNAL MEDICINE CLINIC | Facility: CLINIC | Age: 84
End: 2019-03-28
Payer: MEDICARE

## 2019-03-28 VITALS
DIASTOLIC BLOOD PRESSURE: 70 MMHG | HEIGHT: 72 IN | HEART RATE: 60 BPM | BODY MASS INDEX: 27.22 KG/M2 | OXYGEN SATURATION: 97 % | TEMPERATURE: 98 F | WEIGHT: 201 LBS | SYSTOLIC BLOOD PRESSURE: 122 MMHG

## 2019-03-28 DIAGNOSIS — R31.0 GROSS HEMATURIA: ICD-10-CM

## 2019-03-28 DIAGNOSIS — Z00.00 MEDICARE ANNUAL WELLNESS VISIT, SUBSEQUENT: Primary | ICD-10-CM

## 2019-03-28 PROBLEM — H92.01 RIGHT EAR PAIN: Status: RESOLVED | Noted: 2018-05-22 | Resolved: 2019-03-28

## 2019-03-28 PROCEDURE — G0439 PPPS, SUBSEQ VISIT: HCPCS | Performed by: INTERNAL MEDICINE

## 2019-03-28 NOTE — PATIENT INSTRUCTIONS

## 2019-03-28 NOTE — PROGRESS NOTES
Assessment and Plan:    Problem List Items Addressed This Visit        Other    Medicare annual wellness visit, subsequent - Primary      Other Visit Diagnoses     Gross hematuria        Relevant Orders    UA/M w/rflx Culture, Routine        Health Maintenance Due   Topic Date Due    Medicare Annual Wellness Visit (AWV)  05/14/1928    BMI: Followup Plan  05/14/1946   Recent labs reviewed  Increase water intake  Continue present Rx  Due for cardio followup - will call to set up  Rto 4 months       HPI:  Cong Hirsch is a 80 y o  male here for his Subsequent Wellness Visit  Pt doing generally well   No chest pain or sob No falls Remains active    Patient Active Problem List   Diagnosis    Arthritis    [facility]rial fibrillation ([facility] )    Benign prostatic hyperplasia    Diverticulosis    Hypercholesterolemia    Lower back pain    Paroxysmal ventricular tachycardia ([facility] )    Pulmonary artery hypertension (HCC)    RBBB    Medicare annual wellness visit, subsequent     Past Medical History:   Diagnosis Date    Arthritis     Atrial fibrillation ([facility] )     Last Assessed:8/10/17    Benign prostatic hyperplasia     Last Assessed:1/28/14    BPH (benign prostatic hyperplasia)     Complete atrioventricular block (HCC)     Diverticulosis     Last Assessed:4/30/13    Hypercholesterolemia     Hypertension     Paroxysmal ventricular tachycardia (HCC)     Last Assessed:7/20/17    Prostatitis     Last Assessed:12/19/12    Pulmonary artery hypertension (HCC)     mild pulmonary htn    Right bundle branch block     Last Assessed:11/26/13    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     Last YLGCJJFBZ:46/7/25     Past Surgical History:   Procedure Laterality Date    CARDIAC PACEMAKER PLACEMENT      St  Judes DC PPM    COLONOSCOPY      HEMORRHOID SURGERY      Cauterization of hemorrhoids    HERNIA REPAIR      INCISION AND DRAINAGE ABSCESS / HEMATOMA OF BURSA / KNEE / THIGH      Fatty Tumor Removed    KNEE SURGERY Left Steel Removes from left knee     Family History   Problem Relation Age of Onset    Hypertension Mother     Stroke Mother         Stroke Syndrome    Other Sister         pacemaker placement    Prostate cancer Brother     Diabetes Family      Social History     Tobacco Use   Smoking Status Former Smoker   Smokeless Tobacco Never Used     Social History     Substance and Sexual Activity   Alcohol Use Yes    Comment: social drinker      Social History     Substance and Sexual Activity   Drug Use No       Current Outpatient Medications   Medication Sig Dispense Refill    Ascorbic Acid (VITAMIN C) 1000 MG tablet Take 1 tablet by mouth daily      aspirin 325 mg tablet Take 1 tablet by mouth daily      Cholecalciferol (VITAMIN D3) 1000 units CAPS Take 1 tablet by mouth daily      cyanocobalamin (VITAMIN B-12) 100 mcg tablet Take 1 tablet by mouth daily      metoprolol succinate (TOPROL-XL) 50 mg 24 hr tablet TAKE 1 5 TABLET DAILY 135 tablet 3    neomycin-polymyxin-hydrocortisone (CORTISPORIN) otic solution Administer 4 drops to the right ear every 6 (six) hours 10 mL 0    Red Yeast Rice 600 MG TABS Take by mouth daily      tamsulosin (FLOMAX) 0 4 mg Take 1 capsule (0 4 mg total) by mouth daily for 90 days 90 capsule 3     No current facility-administered medications for this visit        Allergies   Allergen Reactions    Ibuprofen Hives     Immunization History   Administered Date(s) Administered    Influenza Split High Dose Preservative Free IM 10/10/2013, 10/31/2014, 10/31/2014, 10/13/2015, 10/12/2016, 11/16/2017    Influenza, high dose seasonal 0 5 mL 09/26/2018    Pneumococcal Conjugate 13-Valent 09/26/2018    Pneumococcal Polysaccharide PPV23 10/10/2013    TD (adult) Preservative Free 07/27/2012       Patient Care Team:  Erin Levi DO as PCP - General  MD Cathleen Shah MD Roseanna Idler, DO  Vitals:    03/28/19 1555 03/28/19 1624   BP:  122/70   Pulse: 60    Temp: 98 °F (36 7 °C)    TempSrc: Tympanic    SpO2: 97%    Weight: 91 2 kg (201 lb)    Height: 6' (1 829 m)        Medicare Screening Tests and Risk Assessments:  Farhad Mack is here for his Subsequent Wellness visit  Last Medicare Wellness visit information reviewed, patient interviewed and updates made to the record today  Health Risk Assessment:  Patient rates overall health as good  Patient feels that their physical health rating is Same  Eyesight was rated as Slightly worse  Hearing was rated as Same  Patient feels that their emotional and mental health rating is Same  Pain experienced by patient in the last 7 days has been None  Patient states that he has experienced no weight loss or gain in last 6 months  Emotional/Mental Health:  Patient has been feeling nervous/anxious  PHQ-9 Depression Screening:    Frequency of the following problems over the past two weeks:      1  Little interest or pleasure in doing things: 0 - not at all      2  Feeling down, depressed, or hopeless: 0 - not at all  PHQ-2 Score: 0          Broken Bones/Falls: Fall Risk Assessment:    In the past year, patient has experienced: No history of falling in past year          Bladder/Bowel:  Patient has not leaked urine accidently in the last six months  Patient reports no loss of bowel control  Immunizations:  Patient has had a flu vaccination within the last year  Patient has received a pneumonia shot  Patient has received a shingles shot  Patient has received tetanus/diphtheria shot  Date of tetanus/diphtheria shot: 7/27/2012(Additional Comments: Pt states that he had the shingles vaccine a couple of years ago )    Home Safety:  Patient does not have trouble with stairs inside or outside of their home  Patient currently reports that there are no safety hazards present in home, working smoke alarms, no working carbon monoxide detectors        Preventative Screenings:   no prostate cancer screen performed, colon cancer screen completed, cholesterol screen completed, 1/16/2019  no glaucoma eye exam completed(Additional Comments: Pt states that he is due for an eye exam )    Nutrition:  Current diet: Regular with servings of the following:    Medications:  Patient is currently taking over-the-counter supplements  List of OTC medications includes: vit c, vit d3, vit b12, red yeast rice  Patient is able to manage medications  Lifestyle Choices:  Patient reports no tobacco use  Patient has smoked or used tobacco in the past   Patient has stopped his tobacco use  Tobacco use quit date: 1955  Patient reports alcohol use  Alcohol use per week: special occassions, very rare  Patient drives a vehicle  Patient wears seat belt  Current level of exercise of physical activity described by patient as: pt states that he walks           Activities of Daily Living:  Can get out of bed by his or her self, able to dress self, able to make own meals, able to do own shopping, able to bathe self, can do own laundry/housekeeping, can manage own money, pay bills and track expenses    Previous Hospitalizations:  No hospitalization or ED visit in past 12 months        Advanced Directives:  Patient has decided on a power of   Patient has spoken to designated power of   Patient has completed advanced directive          Preventative Screening/Counseling:      Cardiovascular:      General: Screening Current      Counseling: Healthy Diet and Healthy Weight          Diabetes:      General: Screening Current      Counseling: Healthy Diet and Healthy Weight          Colorectal Cancer:      General: Screening Not Indicated      Counseling: high fiber diet          Prostate Cancer:      General: Screening Not Indicated          Osteoporosis:      General: Screening Not Indicated      Counseling: Calcium and Vitamin D Intake and Regular Weightbearing Exercise          AAA:      General: Screening Not Indicated          Glaucoma:      General: Screening Current          HIV:      General: Screening Not Indicated          Hepatitis C:      General: Screening Not Indicated        Advanced Directives:   Patient has living will for healthcare, has durable POA for healthcare, patient has an advanced directive  End of life assessment reviewed with patient  Provider agrees with end of life decisions   Immunizations:      Influenza: Influenza UTD This Year      Pneumococcal: Lifetime Vaccine Completed      Shingrix: Risks & Benefits Discussed      Hepatitis B (Low risk patients): Series Not Indicated      Hepatitis B (Medium to high risk patients): Patient Declines      Zostavax: Vaccine Status Unknown      TD: Vaccine Status Unknown      TDAP: Vaccine Status Unknown      Other Preventative Counseling (Non-Medicare):   Fall Prevention

## 2019-03-29 ENCOUNTER — APPOINTMENT (OUTPATIENT)
Dept: LAB | Facility: HOSPITAL | Age: 84
End: 2019-03-29
Attending: INTERNAL MEDICINE
Payer: MEDICARE

## 2019-03-29 DIAGNOSIS — R31.0 GROSS HEMATURIA: ICD-10-CM

## 2019-03-29 LAB
BACTERIA UR QL AUTO: ABNORMAL /HPF
BILIRUB UR QL STRIP: NEGATIVE
CLARITY UR: CLEAR
COLOR UR: YELLOW
GLUCOSE UR STRIP-MCNC: NEGATIVE MG/DL
HGB UR QL STRIP.AUTO: NEGATIVE
KETONES UR STRIP-MCNC: NEGATIVE MG/DL
LEUKOCYTE ESTERASE UR QL STRIP: ABNORMAL
NITRITE UR QL STRIP: NEGATIVE
NON-SQ EPI CELLS URNS QL MICRO: ABNORMAL /HPF
PH UR STRIP.AUTO: 5.5 [PH]
PROT UR STRIP-MCNC: NEGATIVE MG/DL
RBC #/AREA URNS AUTO: ABNORMAL /HPF
SP GR UR STRIP.AUTO: 1.02 (ref 1–1.03)
UROBILINOGEN UR QL STRIP.AUTO: 0.2 E.U./DL
WBC #/AREA URNS AUTO: ABNORMAL /HPF

## 2019-03-29 PROCEDURE — 81001 URINALYSIS AUTO W/SCOPE: CPT

## 2019-04-01 DIAGNOSIS — R31.0 GROSS HEMATURIA: Primary | ICD-10-CM

## 2019-04-12 ENCOUNTER — OFFICE VISIT (OUTPATIENT)
Dept: URGENT CARE | Facility: CLINIC | Age: 84
End: 2019-04-12
Payer: MEDICARE

## 2019-04-12 VITALS
SYSTOLIC BLOOD PRESSURE: 112 MMHG | TEMPERATURE: 97 F | HEIGHT: 72 IN | BODY MASS INDEX: 27.22 KG/M2 | DIASTOLIC BLOOD PRESSURE: 60 MMHG | RESPIRATION RATE: 20 BRPM | HEART RATE: 62 BPM | WEIGHT: 201 LBS | OXYGEN SATURATION: 97 %

## 2019-04-12 DIAGNOSIS — A08.4 VIRAL GASTROENTERITIS: Primary | ICD-10-CM

## 2019-04-12 PROCEDURE — G0463 HOSPITAL OUTPT CLINIC VISIT: HCPCS | Performed by: PHYSICIAN ASSISTANT

## 2019-04-12 PROCEDURE — 99213 OFFICE O/P EST LOW 20 MIN: CPT | Performed by: PHYSICIAN ASSISTANT

## 2019-04-12 RX ORDER — ONDANSETRON HYDROCHLORIDE 8 MG/1
8 TABLET, FILM COATED ORAL EVERY 8 HOURS PRN
Qty: 10 TABLET | Refills: 0 | Status: SHIPPED | OUTPATIENT
Start: 2019-04-12 | End: 2019-04-16 | Stop reason: HOSPADM

## 2019-04-12 RX ORDER — ONDANSETRON 4 MG/1
4 TABLET, ORALLY DISINTEGRATING ORAL ONCE
Status: DISCONTINUED | OUTPATIENT
Start: 2019-04-12 | End: 2019-04-12 | Stop reason: ALTCHOICE

## 2019-04-12 RX ADMIN — ONDANSETRON 4 MG: 4 TABLET, ORALLY DISINTEGRATING ORAL at 15:15

## 2019-04-13 ENCOUNTER — HOSPITAL ENCOUNTER (INPATIENT)
Facility: HOSPITAL | Age: 84
LOS: 2 days | Discharge: HOME | DRG: 389 | End: 2019-04-16
Attending: EMERGENCY MEDICINE | Admitting: INTERNAL MEDICINE
Payer: MEDICARE

## 2019-04-13 ENCOUNTER — APPOINTMENT (EMERGENCY)
Dept: CT IMAGING | Facility: HOSPITAL | Age: 84
DRG: 389 | End: 2019-04-13
Payer: MEDICARE

## 2019-04-13 DIAGNOSIS — I27.21 PULMONARY ARTERY HYPERTENSION (HCC): ICD-10-CM

## 2019-04-13 DIAGNOSIS — I48.91 ATRIAL FIBRILLATION, UNSPECIFIED TYPE (HCC): ICD-10-CM

## 2019-04-13 DIAGNOSIS — K56.600 PARTIAL SMALL BOWEL OBSTRUCTION (HCC): ICD-10-CM

## 2019-04-13 DIAGNOSIS — I47.2 PAROXYSMAL VENTRICULAR TACHYCARDIA (HCC): Primary | ICD-10-CM

## 2019-04-13 PROBLEM — R94.31 LEFT AXIS DEVIATION: Status: ACTIVE | Noted: 2019-04-13

## 2019-04-13 PROBLEM — I48.21 PERMANENT ATRIAL FIBRILLATION (HCC): Status: ACTIVE | Noted: 2019-04-13

## 2019-04-13 PROBLEM — I49.3 VENTRICULAR ECTOPY: Status: ACTIVE | Noted: 2019-04-13

## 2019-04-13 PROBLEM — R18.8 ASCITES: Status: ACTIVE | Noted: 2019-04-13

## 2019-04-13 PROBLEM — R19.7 DIARRHEA: Status: ACTIVE | Noted: 2019-04-13

## 2019-04-13 PROBLEM — Z95.0 PRESENCE OF PERMANENT CARDIAC PACEMAKER: Status: ACTIVE | Noted: 2019-04-13

## 2019-04-13 PROBLEM — K56.609 SMALL BOWEL OBSTRUCTION (HCC): Status: ACTIVE | Noted: 2019-04-13

## 2019-04-13 LAB
ALBUMIN SERPL BCP-MCNC: 3.6 G/DL (ref 3.5–5)
ALP SERPL-CCNC: 55 U/L (ref 46–116)
ALT SERPL W P-5'-P-CCNC: 23 U/L (ref 12–78)
ANION GAP SERPL CALCULATED.3IONS-SCNC: 11 MMOL/L (ref 4–13)
AST SERPL W P-5'-P-CCNC: 15 U/L (ref 5–45)
ATRIAL RATE: 61 BPM
BASOPHILS # BLD AUTO: 0.02 THOUSANDS/ΜL (ref 0–0.1)
BASOPHILS NFR BLD AUTO: 0 % (ref 0–1)
BILIRUB SERPL-MCNC: 1.7 MG/DL (ref 0.2–1)
BILIRUB UR QL STRIP: NEGATIVE
BUN SERPL-MCNC: 34 MG/DL (ref 5–25)
CALCIUM SERPL-MCNC: 8.9 MG/DL (ref 8.3–10.1)
CHLORIDE SERPL-SCNC: 100 MMOL/L (ref 100–108)
CLARITY UR: CLEAR
CO2 SERPL-SCNC: 27 MMOL/L (ref 21–32)
COLOR UR: YELLOW
CREAT SERPL-MCNC: 1.37 MG/DL (ref 0.6–1.3)
EOSINOPHIL # BLD AUTO: 0 THOUSAND/ΜL (ref 0–0.61)
EOSINOPHIL NFR BLD AUTO: 0 % (ref 0–6)
ERYTHROCYTE [DISTWIDTH] IN BLOOD BY AUTOMATED COUNT: 14 % (ref 11.6–15.1)
GFR SERPL CREATININE-BSD FRML MDRD: 45 ML/MIN/1.73SQ M
GLUCOSE SERPL-MCNC: 123 MG/DL (ref 65–140)
GLUCOSE UR STRIP-MCNC: NEGATIVE MG/DL
HCT VFR BLD AUTO: 46.7 % (ref 36.5–49.3)
HGB BLD-MCNC: 15.6 G/DL (ref 12–17)
HGB UR QL STRIP.AUTO: NEGATIVE
IMM GRANULOCYTES # BLD AUTO: 0.02 THOUSAND/UL (ref 0–0.2)
IMM GRANULOCYTES NFR BLD AUTO: 0 % (ref 0–2)
KETONES UR STRIP-MCNC: NEGATIVE MG/DL
LACTATE SERPL-SCNC: 1.5 MMOL/L (ref 0.5–2)
LEUKOCYTE ESTERASE UR QL STRIP: NEGATIVE
LIPASE SERPL-CCNC: 42 U/L (ref 73–393)
LYMPHOCYTES # BLD AUTO: 0.52 THOUSANDS/ΜL (ref 0.6–4.47)
LYMPHOCYTES NFR BLD AUTO: 6 % (ref 14–44)
MCH RBC QN AUTO: 30.4 PG (ref 26.8–34.3)
MCHC RBC AUTO-ENTMCNC: 33.4 G/DL (ref 31.4–37.4)
MCV RBC AUTO: 91 FL (ref 82–98)
MONOCYTES # BLD AUTO: 0.94 THOUSAND/ΜL (ref 0.17–1.22)
MONOCYTES NFR BLD AUTO: 11 % (ref 4–12)
NEUTROPHILS # BLD AUTO: 7.12 THOUSANDS/ΜL (ref 1.85–7.62)
NEUTS SEG NFR BLD AUTO: 83 % (ref 43–75)
NITRITE UR QL STRIP: NEGATIVE
NRBC BLD AUTO-RTO: 0 /100 WBCS
PH UR STRIP.AUTO: 5 [PH]
PLATELET # BLD AUTO: 174 THOUSANDS/UL (ref 149–390)
PMV BLD AUTO: 10.3 FL (ref 8.9–12.7)
POTASSIUM SERPL-SCNC: 3.9 MMOL/L (ref 3.5–5.3)
PROT SERPL-MCNC: 7.5 G/DL (ref 6.4–8.2)
PROT UR STRIP-MCNC: NEGATIVE MG/DL
QRS AXIS: -79 DEGREES
QRSD INTERVAL: 168 MS
QT INTERVAL: 454 MS
QTC INTERVAL: 457 MS
RBC # BLD AUTO: 5.13 MILLION/UL (ref 3.88–5.62)
SODIUM SERPL-SCNC: 138 MMOL/L (ref 136–145)
SP GR UR STRIP.AUTO: 1.01 (ref 1–1.03)
T WAVE AXIS: 91 DEGREES
UROBILINOGEN UR QL STRIP.AUTO: 0.2 E.U./DL
VENTRICULAR RATE: 61 BPM
WBC # BLD AUTO: 8.62 THOUSAND/UL (ref 4.31–10.16)

## 2019-04-13 PROCEDURE — 99222 1ST HOSP IP/OBS MODERATE 55: CPT | Performed by: SPECIALIST

## 2019-04-13 PROCEDURE — 83605 ASSAY OF LACTIC ACID: CPT | Performed by: EMERGENCY MEDICINE

## 2019-04-13 PROCEDURE — 1123F ACP DISCUSS/DSCN MKR DOCD: CPT | Performed by: INTERNAL MEDICINE

## 2019-04-13 PROCEDURE — 81003 URINALYSIS AUTO W/O SCOPE: CPT | Performed by: EMERGENCY MEDICINE

## 2019-04-13 PROCEDURE — 99214 OFFICE O/P EST MOD 30 MIN: CPT | Performed by: INTERNAL MEDICINE

## 2019-04-13 PROCEDURE — 74177 CT ABD & PELVIS W/CONTRAST: CPT

## 2019-04-13 PROCEDURE — 87505 NFCT AGENT DETECTION GI: CPT | Performed by: SPECIALIST

## 2019-04-13 PROCEDURE — 93005 ELECTROCARDIOGRAM TRACING: CPT

## 2019-04-13 PROCEDURE — 93010 ELECTROCARDIOGRAM REPORT: CPT | Performed by: INTERNAL MEDICINE

## 2019-04-13 PROCEDURE — 87425 ROTAVIRUS AG IA: CPT | Performed by: INTERNAL MEDICINE

## 2019-04-13 PROCEDURE — 89055 LEUKOCYTE ASSESSMENT FECAL: CPT | Performed by: INTERNAL MEDICINE

## 2019-04-13 PROCEDURE — 87493 C DIFF AMPLIFIED PROBE: CPT | Performed by: INTERNAL MEDICINE

## 2019-04-13 PROCEDURE — 36415 COLL VENOUS BLD VENIPUNCTURE: CPT | Performed by: EMERGENCY MEDICINE

## 2019-04-13 PROCEDURE — 80053 COMPREHEN METABOLIC PANEL: CPT | Performed by: EMERGENCY MEDICINE

## 2019-04-13 PROCEDURE — 85025 COMPLETE CBC W/AUTO DIFF WBC: CPT | Performed by: EMERGENCY MEDICINE

## 2019-04-13 PROCEDURE — 99285 EMERGENCY DEPT VISIT HI MDM: CPT

## 2019-04-13 PROCEDURE — 83690 ASSAY OF LIPASE: CPT | Performed by: EMERGENCY MEDICINE

## 2019-04-13 PROCEDURE — 99284 EMERGENCY DEPT VISIT MOD MDM: CPT | Performed by: EMERGENCY MEDICINE

## 2019-04-13 RX ORDER — UBIDECARENONE 75 MG
100 CAPSULE ORAL DAILY
Status: DISCONTINUED | OUTPATIENT
Start: 2019-04-13 | End: 2019-04-16 | Stop reason: HOSPADM

## 2019-04-13 RX ORDER — DEXTROSE, SODIUM CHLORIDE, AND POTASSIUM CHLORIDE 5; .45; .15 G/100ML; G/100ML; G/100ML
125 INJECTION INTRAVENOUS CONTINUOUS
Status: DISCONTINUED | OUTPATIENT
Start: 2019-04-13 | End: 2019-04-16

## 2019-04-13 RX ORDER — ONDANSETRON 2 MG/ML
4 INJECTION INTRAMUSCULAR; INTRAVENOUS ONCE
Status: DISCONTINUED | OUTPATIENT
Start: 2019-04-13 | End: 2019-04-15

## 2019-04-13 RX ORDER — ASCORBIC ACID 500 MG
1000 TABLET ORAL DAILY
Status: DISCONTINUED | OUTPATIENT
Start: 2019-04-13 | End: 2019-04-16 | Stop reason: HOSPADM

## 2019-04-13 RX ORDER — MELATONIN
1000 DAILY
Status: DISCONTINUED | OUTPATIENT
Start: 2019-04-13 | End: 2019-04-16 | Stop reason: HOSPADM

## 2019-04-13 RX ORDER — ONDANSETRON 2 MG/ML
4 INJECTION INTRAMUSCULAR; INTRAVENOUS EVERY 6 HOURS PRN
Status: DISCONTINUED | OUTPATIENT
Start: 2019-04-13 | End: 2019-04-16 | Stop reason: HOSPADM

## 2019-04-13 RX ORDER — ASPIRIN 325 MG
325 TABLET ORAL DAILY
Status: DISCONTINUED | OUTPATIENT
Start: 2019-04-13 | End: 2019-04-16 | Stop reason: HOSPADM

## 2019-04-13 RX ORDER — TAMSULOSIN HYDROCHLORIDE 0.4 MG/1
0.4 CAPSULE ORAL DAILY
Status: DISCONTINUED | OUTPATIENT
Start: 2019-04-13 | End: 2019-04-16 | Stop reason: HOSPADM

## 2019-04-13 RX ADMIN — VITAMIN D, TAB 1000IU (100/BT) 1000 UNITS: 25 TAB at 13:21

## 2019-04-13 RX ADMIN — ENOXAPARIN SODIUM 40 MG: 40 INJECTION SUBCUTANEOUS at 13:22

## 2019-04-13 RX ADMIN — ASPIRIN 325 MG ORAL TABLET 325 MG: 325 PILL ORAL at 13:22

## 2019-04-13 RX ADMIN — OXYCODONE HYDROCHLORIDE AND ACETAMINOPHEN 1000 MG: 500 TABLET ORAL at 13:21

## 2019-04-13 RX ADMIN — IOHEXOL 100 ML: 350 INJECTION, SOLUTION INTRAVENOUS at 08:36

## 2019-04-13 RX ADMIN — DEXTROSE, SODIUM CHLORIDE, AND POTASSIUM CHLORIDE 125 ML/HR: 5; .45; .15 INJECTION INTRAVENOUS at 21:46

## 2019-04-13 RX ADMIN — TAMSULOSIN HYDROCHLORIDE 0.4 MG: 0.4 CAPSULE ORAL at 17:00

## 2019-04-13 RX ADMIN — VITAM B12 100 MCG: 100 TAB at 13:22

## 2019-04-13 RX ADMIN — SODIUM CHLORIDE 1000 ML: 0.9 INJECTION, SOLUTION INTRAVENOUS at 07:48

## 2019-04-13 RX ADMIN — DEXTROSE, SODIUM CHLORIDE, AND POTASSIUM CHLORIDE 125 ML/HR: 5; .45; .15 INJECTION INTRAVENOUS at 13:26

## 2019-04-13 RX ADMIN — METOPROLOL SUCCINATE 75 MG: 50 TABLET, EXTENDED RELEASE ORAL at 13:21

## 2019-04-14 ENCOUNTER — APPOINTMENT (OUTPATIENT)
Dept: RADIOLOGY | Facility: HOSPITAL | Age: 84
DRG: 389 | End: 2019-04-14
Payer: MEDICARE

## 2019-04-14 PROBLEM — E83.39 HYPOPHOSPHATEMIA: Status: ACTIVE | Noted: 2019-04-14

## 2019-04-14 LAB
ALBUMIN SERPL BCP-MCNC: 3.2 G/DL (ref 3.5–5)
ALP SERPL-CCNC: 46 U/L (ref 46–116)
ALT SERPL W P-5'-P-CCNC: 20 U/L (ref 12–78)
ANION GAP SERPL CALCULATED.3IONS-SCNC: 9 MMOL/L (ref 4–13)
AST SERPL W P-5'-P-CCNC: 19 U/L (ref 5–45)
BASOPHILS # BLD AUTO: 0.02 THOUSANDS/ΜL (ref 0–0.1)
BASOPHILS NFR BLD AUTO: 0 % (ref 0–1)
BILIRUB SERPL-MCNC: 1.2 MG/DL (ref 0.2–1)
BUN SERPL-MCNC: 21 MG/DL (ref 5–25)
CALCIUM SERPL-MCNC: 8.7 MG/DL (ref 8.3–10.1)
CHLORIDE SERPL-SCNC: 103 MMOL/L (ref 100–108)
CO2 SERPL-SCNC: 24 MMOL/L (ref 21–32)
CREAT SERPL-MCNC: 1.05 MG/DL (ref 0.6–1.3)
EOSINOPHIL # BLD AUTO: 0.07 THOUSAND/ΜL (ref 0–0.61)
EOSINOPHIL NFR BLD AUTO: 1 % (ref 0–6)
ERYTHROCYTE [DISTWIDTH] IN BLOOD BY AUTOMATED COUNT: 14.2 % (ref 11.6–15.1)
GFR SERPL CREATININE-BSD FRML MDRD: 62 ML/MIN/1.73SQ M
GLUCOSE P FAST SERPL-MCNC: 103 MG/DL (ref 65–99)
GLUCOSE SERPL-MCNC: 103 MG/DL (ref 65–140)
HCT VFR BLD AUTO: 44.9 % (ref 36.5–49.3)
HGB BLD-MCNC: 14.6 G/DL (ref 12–17)
IMM GRANULOCYTES # BLD AUTO: 0.01 THOUSAND/UL (ref 0–0.2)
IMM GRANULOCYTES NFR BLD AUTO: 0 % (ref 0–2)
LACTATE SERPL-SCNC: 0.8 MMOL/L (ref 0.5–2)
LYMPHOCYTES # BLD AUTO: 0.93 THOUSANDS/ΜL (ref 0.6–4.47)
LYMPHOCYTES NFR BLD AUTO: 19 % (ref 14–44)
MAGNESIUM SERPL-MCNC: 2.2 MG/DL (ref 1.6–2.6)
MCH RBC QN AUTO: 30 PG (ref 26.8–34.3)
MCHC RBC AUTO-ENTMCNC: 32.5 G/DL (ref 31.4–37.4)
MCV RBC AUTO: 92 FL (ref 82–98)
MONOCYTES # BLD AUTO: 0.54 THOUSAND/ΜL (ref 0.17–1.22)
MONOCYTES NFR BLD AUTO: 11 % (ref 4–12)
NEUTROPHILS # BLD AUTO: 3.3 THOUSANDS/ΜL (ref 1.85–7.62)
NEUTS SEG NFR BLD AUTO: 69 % (ref 43–75)
NRBC BLD AUTO-RTO: 0 /100 WBCS
PHOSPHATE SERPL-MCNC: 1.8 MG/DL (ref 2.3–4.1)
PLATELET # BLD AUTO: 153 THOUSANDS/UL (ref 149–390)
PMV BLD AUTO: 10.2 FL (ref 8.9–12.7)
POTASSIUM SERPL-SCNC: 4.5 MMOL/L (ref 3.5–5.3)
PROCALCITONIN SERPL-MCNC: 0.13 NG/ML
PROT SERPL-MCNC: 6.9 G/DL (ref 6.4–8.2)
RBC # BLD AUTO: 4.86 MILLION/UL (ref 3.88–5.62)
RV AG STL QL: NEGATIVE
SODIUM SERPL-SCNC: 136 MMOL/L (ref 136–145)
WBC # BLD AUTO: 4.87 THOUSAND/UL (ref 4.31–10.16)

## 2019-04-14 PROCEDURE — 99232 SBSQ HOSP IP/OBS MODERATE 35: CPT | Performed by: SPECIALIST

## 2019-04-14 PROCEDURE — 84145 PROCALCITONIN (PCT): CPT | Performed by: INTERNAL MEDICINE

## 2019-04-14 PROCEDURE — 85025 COMPLETE CBC W/AUTO DIFF WBC: CPT | Performed by: INTERNAL MEDICINE

## 2019-04-14 PROCEDURE — 83605 ASSAY OF LACTIC ACID: CPT | Performed by: INTERNAL MEDICINE

## 2019-04-14 PROCEDURE — 99232 SBSQ HOSP IP/OBS MODERATE 35: CPT | Performed by: INTERNAL MEDICINE

## 2019-04-14 PROCEDURE — 74022 RADEX COMPL AQT ABD SERIES: CPT

## 2019-04-14 PROCEDURE — 84100 ASSAY OF PHOSPHORUS: CPT | Performed by: INTERNAL MEDICINE

## 2019-04-14 PROCEDURE — C9113 INJ PANTOPRAZOLE SODIUM, VIA: HCPCS | Performed by: SPECIALIST

## 2019-04-14 PROCEDURE — 83735 ASSAY OF MAGNESIUM: CPT | Performed by: INTERNAL MEDICINE

## 2019-04-14 PROCEDURE — 80053 COMPREHEN METABOLIC PANEL: CPT | Performed by: SPECIALIST

## 2019-04-14 RX ORDER — PANTOPRAZOLE SODIUM 40 MG/1
40 INJECTION, POWDER, FOR SOLUTION INTRAVENOUS
Status: DISCONTINUED | OUTPATIENT
Start: 2019-04-14 | End: 2019-04-16 | Stop reason: HOSPADM

## 2019-04-14 RX ADMIN — DEXTROSE, SODIUM CHLORIDE, AND POTASSIUM CHLORIDE 125 ML/HR: 5; .45; .15 INJECTION INTRAVENOUS at 04:30

## 2019-04-14 RX ADMIN — ENOXAPARIN SODIUM 40 MG: 40 INJECTION SUBCUTANEOUS at 08:59

## 2019-04-14 RX ADMIN — VITAMIN D, TAB 1000IU (100/BT) 1000 UNITS: 25 TAB at 08:56

## 2019-04-14 RX ADMIN — PANTOPRAZOLE SODIUM 40 MG: 40 INJECTION, POWDER, FOR SOLUTION INTRAVENOUS at 13:58

## 2019-04-14 RX ADMIN — VITAM B12 100 MCG: 100 TAB at 08:59

## 2019-04-14 RX ADMIN — DEXTROSE, SODIUM CHLORIDE, AND POTASSIUM CHLORIDE 125 ML/HR: 5; .45; .15 INJECTION INTRAVENOUS at 13:58

## 2019-04-14 RX ADMIN — TAMSULOSIN HYDROCHLORIDE 0.4 MG: 0.4 CAPSULE ORAL at 16:32

## 2019-04-14 RX ADMIN — DIBASIC SODIUM PHOSPHATE, MONOBASIC POTASSIUM PHOSPHATE AND MONOBASIC SODIUM PHOSPHATE 2 TABLET: 852; 155; 130 TABLET ORAL at 11:38

## 2019-04-14 RX ADMIN — ASPIRIN 325 MG ORAL TABLET 325 MG: 325 PILL ORAL at 08:55

## 2019-04-14 RX ADMIN — OXYCODONE HYDROCHLORIDE AND ACETAMINOPHEN 1000 MG: 500 TABLET ORAL at 08:55

## 2019-04-14 RX ADMIN — METOPROLOL SUCCINATE 75 MG: 50 TABLET, EXTENDED RELEASE ORAL at 08:56

## 2019-04-14 RX ADMIN — DEXTROSE, SODIUM CHLORIDE, AND POTASSIUM CHLORIDE 125 ML/HR: 5; .45; .15 INJECTION INTRAVENOUS at 22:07

## 2019-04-15 ENCOUNTER — APPOINTMENT (INPATIENT)
Dept: RADIOLOGY | Facility: HOSPITAL | Age: 84
DRG: 389 | End: 2019-04-15
Payer: MEDICARE

## 2019-04-15 LAB
ALBUMIN SERPL BCP-MCNC: 3.1 G/DL (ref 3.5–5)
ALP SERPL-CCNC: 44 U/L (ref 46–116)
ALT SERPL W P-5'-P-CCNC: 18 U/L (ref 12–78)
ANION GAP SERPL CALCULATED.3IONS-SCNC: 10 MMOL/L (ref 4–13)
AST SERPL W P-5'-P-CCNC: 14 U/L (ref 5–45)
BASOPHILS # BLD AUTO: 0.01 THOUSANDS/ΜL (ref 0–0.1)
BASOPHILS NFR BLD AUTO: 0 % (ref 0–1)
BILIRUB SERPL-MCNC: 1.1 MG/DL (ref 0.2–1)
BUN SERPL-MCNC: 13 MG/DL (ref 5–25)
C DIFF TOX GENS STL QL NAA+PROBE: NORMAL
CALCIUM SERPL-MCNC: 8.4 MG/DL (ref 8.3–10.1)
CAMPYLOBACTER DNA SPEC NAA+PROBE: NORMAL
CHLORIDE SERPL-SCNC: 104 MMOL/L (ref 100–108)
CO2 SERPL-SCNC: 24 MMOL/L (ref 21–32)
CREAT SERPL-MCNC: 1.03 MG/DL (ref 0.6–1.3)
EOSINOPHIL # BLD AUTO: 0.06 THOUSAND/ΜL (ref 0–0.61)
EOSINOPHIL NFR BLD AUTO: 1 % (ref 0–6)
ERYTHROCYTE [DISTWIDTH] IN BLOOD BY AUTOMATED COUNT: 14.1 % (ref 11.6–15.1)
GFR SERPL CREATININE-BSD FRML MDRD: 64 ML/MIN/1.73SQ M
GLUCOSE SERPL-MCNC: 110 MG/DL (ref 65–140)
HCT VFR BLD AUTO: 43.9 % (ref 36.5–49.3)
HGB BLD-MCNC: 14.3 G/DL (ref 12–17)
IMM GRANULOCYTES # BLD AUTO: 0 THOUSAND/UL (ref 0–0.2)
IMM GRANULOCYTES NFR BLD AUTO: 0 % (ref 0–2)
LYMPHOCYTES # BLD AUTO: 0.73 THOUSANDS/ΜL (ref 0.6–4.47)
LYMPHOCYTES NFR BLD AUTO: 17 % (ref 14–44)
MAGNESIUM SERPL-MCNC: 2.3 MG/DL (ref 1.6–2.6)
MCH RBC QN AUTO: 30 PG (ref 26.8–34.3)
MCHC RBC AUTO-ENTMCNC: 32.6 G/DL (ref 31.4–37.4)
MCV RBC AUTO: 92 FL (ref 82–98)
MONOCYTES # BLD AUTO: 0.57 THOUSAND/ΜL (ref 0.17–1.22)
MONOCYTES NFR BLD AUTO: 13 % (ref 4–12)
NEUTROPHILS # BLD AUTO: 2.97 THOUSANDS/ΜL (ref 1.85–7.62)
NEUTS SEG NFR BLD AUTO: 69 % (ref 43–75)
NRBC BLD AUTO-RTO: 0 /100 WBCS
PHOSPHATE SERPL-MCNC: 2.2 MG/DL (ref 2.3–4.1)
PLATELET # BLD AUTO: 149 THOUSANDS/UL (ref 149–390)
PMV BLD AUTO: 10.1 FL (ref 8.9–12.7)
POTASSIUM SERPL-SCNC: 4.7 MMOL/L (ref 3.5–5.3)
PROCALCITONIN SERPL-MCNC: <0.05 NG/ML
PROT SERPL-MCNC: 6.8 G/DL (ref 6.4–8.2)
RBC # BLD AUTO: 4.76 MILLION/UL (ref 3.88–5.62)
SALMONELLA DNA SPEC QL NAA+PROBE: NORMAL
SHIGA TOXIN STX GENE SPEC NAA+PROBE: NORMAL
SHIGELLA DNA SPEC QL NAA+PROBE: NORMAL
SODIUM SERPL-SCNC: 138 MMOL/L (ref 136–145)
WBC # BLD AUTO: 4.34 THOUSAND/UL (ref 4.31–10.16)

## 2019-04-15 PROCEDURE — 80053 COMPREHEN METABOLIC PANEL: CPT | Performed by: INTERNAL MEDICINE

## 2019-04-15 PROCEDURE — 94760 N-INVAS EAR/PLS OXIMETRY 1: CPT

## 2019-04-15 PROCEDURE — 99232 SBSQ HOSP IP/OBS MODERATE 35: CPT | Performed by: INTERNAL MEDICINE

## 2019-04-15 PROCEDURE — 84100 ASSAY OF PHOSPHORUS: CPT | Performed by: INTERNAL MEDICINE

## 2019-04-15 PROCEDURE — 74250 X-RAY XM SM INT 1CNTRST STD: CPT

## 2019-04-15 PROCEDURE — 83735 ASSAY OF MAGNESIUM: CPT | Performed by: INTERNAL MEDICINE

## 2019-04-15 PROCEDURE — 99233 SBSQ HOSP IP/OBS HIGH 50: CPT | Performed by: SURGERY

## 2019-04-15 PROCEDURE — C9113 INJ PANTOPRAZOLE SODIUM, VIA: HCPCS | Performed by: SPECIALIST

## 2019-04-15 PROCEDURE — 84145 PROCALCITONIN (PCT): CPT | Performed by: INTERNAL MEDICINE

## 2019-04-15 PROCEDURE — 94761 N-INVAS EAR/PLS OXIMETRY MLT: CPT

## 2019-04-15 PROCEDURE — 85025 COMPLETE CBC W/AUTO DIFF WBC: CPT | Performed by: INTERNAL MEDICINE

## 2019-04-15 RX ADMIN — DEXTROSE, SODIUM CHLORIDE, AND POTASSIUM CHLORIDE 125 ML/HR: 5; .45; .15 INJECTION INTRAVENOUS at 05:33

## 2019-04-15 RX ADMIN — TAMSULOSIN HYDROCHLORIDE 0.4 MG: 0.4 CAPSULE ORAL at 15:46

## 2019-04-15 RX ADMIN — DEXTROSE, SODIUM CHLORIDE, AND POTASSIUM CHLORIDE 125 ML/HR: 5; .45; .15 INJECTION INTRAVENOUS at 16:42

## 2019-04-15 RX ADMIN — ENOXAPARIN SODIUM 40 MG: 40 INJECTION SUBCUTANEOUS at 08:25

## 2019-04-15 RX ADMIN — ASPIRIN 325 MG ORAL TABLET 325 MG: 325 PILL ORAL at 08:25

## 2019-04-15 RX ADMIN — METOPROLOL SUCCINATE 75 MG: 50 TABLET, EXTENDED RELEASE ORAL at 08:24

## 2019-04-15 RX ADMIN — PANTOPRAZOLE SODIUM 40 MG: 40 INJECTION, POWDER, FOR SOLUTION INTRAVENOUS at 08:25

## 2019-04-15 RX ADMIN — OXYCODONE HYDROCHLORIDE AND ACETAMINOPHEN 1000 MG: 500 TABLET ORAL at 08:24

## 2019-04-15 RX ADMIN — VITAM B12 100 MCG: 100 TAB at 08:25

## 2019-04-15 RX ADMIN — IOHEXOL 300 ML: 350 INJECTION, SOLUTION INTRAVENOUS at 15:45

## 2019-04-15 RX ADMIN — VITAMIN D, TAB 1000IU (100/BT) 1000 UNITS: 25 TAB at 08:24

## 2019-04-15 RX ADMIN — SODIUM PHOSPHATE, MONOBASIC, MONOHYDRATE 6 MMOL: 276; 142 INJECTION, SOLUTION INTRAVENOUS at 14:11

## 2019-04-16 ENCOUNTER — TRANSITIONAL CARE MANAGEMENT (OUTPATIENT)
Dept: INTERNAL MEDICINE CLINIC | Facility: CLINIC | Age: 84
End: 2019-04-16

## 2019-04-16 VITALS
OXYGEN SATURATION: 94 % | HEIGHT: 75 IN | RESPIRATION RATE: 20 BRPM | DIASTOLIC BLOOD PRESSURE: 74 MMHG | TEMPERATURE: 98.1 F | BODY MASS INDEX: 24 KG/M2 | HEART RATE: 60 BPM | WEIGHT: 193 LBS | SYSTOLIC BLOOD PRESSURE: 169 MMHG

## 2019-04-16 LAB
ALBUMIN SERPL BCP-MCNC: 3.2 G/DL (ref 3.5–5)
ALP SERPL-CCNC: 50 U/L (ref 46–116)
ALT SERPL W P-5'-P-CCNC: 22 U/L (ref 12–78)
ANION GAP SERPL CALCULATED.3IONS-SCNC: 11 MMOL/L (ref 4–13)
AST SERPL W P-5'-P-CCNC: 16 U/L (ref 5–45)
BASOPHILS # BLD AUTO: 0.02 THOUSANDS/ΜL (ref 0–0.1)
BASOPHILS NFR BLD AUTO: 0 % (ref 0–1)
BILIRUB SERPL-MCNC: 1.1 MG/DL (ref 0.2–1)
BUN SERPL-MCNC: 11 MG/DL (ref 5–25)
CALCIUM SERPL-MCNC: 8.9 MG/DL (ref 8.3–10.1)
CHLORIDE SERPL-SCNC: 104 MMOL/L (ref 100–108)
CO2 SERPL-SCNC: 22 MMOL/L (ref 21–32)
CREAT SERPL-MCNC: 1.01 MG/DL (ref 0.6–1.3)
EOSINOPHIL # BLD AUTO: 0.09 THOUSAND/ΜL (ref 0–0.61)
EOSINOPHIL NFR BLD AUTO: 2 % (ref 0–6)
ERYTHROCYTE [DISTWIDTH] IN BLOOD BY AUTOMATED COUNT: 14 % (ref 11.6–15.1)
GFR SERPL CREATININE-BSD FRML MDRD: 65 ML/MIN/1.73SQ M
GLUCOSE SERPL-MCNC: 101 MG/DL (ref 65–140)
HCT VFR BLD AUTO: 42.4 % (ref 36.5–49.3)
HGB BLD-MCNC: 13.7 G/DL (ref 12–17)
IMM GRANULOCYTES # BLD AUTO: 0.02 THOUSAND/UL (ref 0–0.2)
IMM GRANULOCYTES NFR BLD AUTO: 0 % (ref 0–2)
LYMPHOCYTES # BLD AUTO: 0.85 THOUSANDS/ΜL (ref 0.6–4.47)
LYMPHOCYTES NFR BLD AUTO: 17 % (ref 14–44)
MAGNESIUM SERPL-MCNC: 2.1 MG/DL (ref 1.6–2.6)
MCH RBC QN AUTO: 29.5 PG (ref 26.8–34.3)
MCHC RBC AUTO-ENTMCNC: 32.3 G/DL (ref 31.4–37.4)
MCV RBC AUTO: 91 FL (ref 82–98)
MONOCYTES # BLD AUTO: 0.54 THOUSAND/ΜL (ref 0.17–1.22)
MONOCYTES NFR BLD AUTO: 11 % (ref 4–12)
NEUTROPHILS # BLD AUTO: 3.63 THOUSANDS/ΜL (ref 1.85–7.62)
NEUTS SEG NFR BLD AUTO: 70 % (ref 43–75)
NRBC BLD AUTO-RTO: 0 /100 WBCS
PHOSPHATE SERPL-MCNC: 1.9 MG/DL (ref 2.3–4.1)
PLATELET # BLD AUTO: 147 THOUSANDS/UL (ref 149–390)
PMV BLD AUTO: 10.3 FL (ref 8.9–12.7)
POTASSIUM SERPL-SCNC: 4.4 MMOL/L (ref 3.5–5.3)
PROCALCITONIN SERPL-MCNC: <0.05 NG/ML
PROT SERPL-MCNC: 6.8 G/DL (ref 6.4–8.2)
RBC # BLD AUTO: 4.64 MILLION/UL (ref 3.88–5.62)
SODIUM SERPL-SCNC: 137 MMOL/L (ref 136–145)
WBC # BLD AUTO: 5.15 THOUSAND/UL (ref 4.31–10.16)

## 2019-04-16 PROCEDURE — 84100 ASSAY OF PHOSPHORUS: CPT | Performed by: INTERNAL MEDICINE

## 2019-04-16 PROCEDURE — 94760 N-INVAS EAR/PLS OXIMETRY 1: CPT

## 2019-04-16 PROCEDURE — 84145 PROCALCITONIN (PCT): CPT | Performed by: INTERNAL MEDICINE

## 2019-04-16 PROCEDURE — C9113 INJ PANTOPRAZOLE SODIUM, VIA: HCPCS | Performed by: SPECIALIST

## 2019-04-16 PROCEDURE — NC001 PR NO CHARGE: Performed by: SURGERY

## 2019-04-16 PROCEDURE — 83735 ASSAY OF MAGNESIUM: CPT | Performed by: INTERNAL MEDICINE

## 2019-04-16 PROCEDURE — 99239 HOSP IP/OBS DSCHRG MGMT >30: CPT | Performed by: SURGERY

## 2019-04-16 PROCEDURE — 80053 COMPREHEN METABOLIC PANEL: CPT | Performed by: INTERNAL MEDICINE

## 2019-04-16 PROCEDURE — 85025 COMPLETE CBC W/AUTO DIFF WBC: CPT | Performed by: INTERNAL MEDICINE

## 2019-04-16 PROCEDURE — 94761 N-INVAS EAR/PLS OXIMETRY MLT: CPT

## 2019-04-16 RX ADMIN — METOPROLOL SUCCINATE 75 MG: 50 TABLET, EXTENDED RELEASE ORAL at 09:03

## 2019-04-16 RX ADMIN — DEXTROSE, SODIUM CHLORIDE, AND POTASSIUM CHLORIDE 125 ML/HR: 5; .45; .15 INJECTION INTRAVENOUS at 00:38

## 2019-04-16 RX ADMIN — VITAM B12 100 MCG: 100 TAB at 09:03

## 2019-04-16 RX ADMIN — ASPIRIN 325 MG ORAL TABLET 325 MG: 325 PILL ORAL at 09:03

## 2019-04-16 RX ADMIN — PANTOPRAZOLE SODIUM 40 MG: 40 INJECTION, POWDER, FOR SOLUTION INTRAVENOUS at 09:03

## 2019-04-16 RX ADMIN — VITAMIN D, TAB 1000IU (100/BT) 1000 UNITS: 25 TAB at 09:03

## 2019-04-16 RX ADMIN — ENOXAPARIN SODIUM 40 MG: 40 INJECTION SUBCUTANEOUS at 09:02

## 2019-04-16 RX ADMIN — OXYCODONE HYDROCHLORIDE AND ACETAMINOPHEN 1000 MG: 500 TABLET ORAL at 09:03

## 2019-04-17 LAB — WBC SPEC QL GRAM STN: ABNORMAL

## 2019-04-24 ENCOUNTER — OFFICE VISIT (OUTPATIENT)
Dept: INTERNAL MEDICINE CLINIC | Facility: CLINIC | Age: 84
End: 2019-04-24
Payer: MEDICARE

## 2019-04-24 VITALS
OXYGEN SATURATION: 99 % | HEIGHT: 75 IN | DIASTOLIC BLOOD PRESSURE: 68 MMHG | SYSTOLIC BLOOD PRESSURE: 126 MMHG | HEART RATE: 76 BPM | WEIGHT: 198.38 LBS | TEMPERATURE: 97.8 F | BODY MASS INDEX: 24.66 KG/M2

## 2019-04-24 DIAGNOSIS — I47.2 PAROXYSMAL VENTRICULAR TACHYCARDIA (HCC): ICD-10-CM

## 2019-04-24 DIAGNOSIS — K56.609 SMALL BOWEL OBSTRUCTION (HCC): Primary | ICD-10-CM

## 2019-04-24 DIAGNOSIS — R19.7 DIARRHEA, UNSPECIFIED TYPE: ICD-10-CM

## 2019-04-24 PROBLEM — R94.31 LEFT AXIS DEVIATION: Status: RESOLVED | Noted: 2019-04-13 | Resolved: 2019-04-24

## 2019-04-24 PROBLEM — E83.39 HYPOPHOSPHATEMIA: Status: RESOLVED | Noted: 2019-04-14 | Resolved: 2019-04-24

## 2019-04-24 PROCEDURE — 99495 TRANSJ CARE MGMT MOD F2F 14D: CPT | Performed by: INTERNAL MEDICINE

## 2019-05-16 ENCOUNTER — OFFICE VISIT (OUTPATIENT)
Dept: SURGERY | Facility: HOSPITAL | Age: 84
End: 2019-05-16
Payer: MEDICARE

## 2019-05-16 VITALS
HEART RATE: 70 BPM | SYSTOLIC BLOOD PRESSURE: 123 MMHG | TEMPERATURE: 98.1 F | DIASTOLIC BLOOD PRESSURE: 67 MMHG | WEIGHT: 198 LBS | HEIGHT: 75 IN | BODY MASS INDEX: 24.62 KG/M2

## 2019-05-16 DIAGNOSIS — K56.609 SMALL BOWEL OBSTRUCTION (HCC): Primary | ICD-10-CM

## 2019-05-16 PROCEDURE — 99211 OFF/OP EST MAY X REQ PHY/QHP: CPT | Performed by: SURGERY

## 2019-05-28 ENCOUNTER — REMOTE DEVICE CLINIC VISIT (OUTPATIENT)
Dept: CARDIOLOGY CLINIC | Facility: CLINIC | Age: 84
End: 2019-05-28
Payer: MEDICARE

## 2019-05-28 DIAGNOSIS — I48.19 PERSISTENT ATRIAL FIBRILLATION (HCC): Primary | ICD-10-CM

## 2019-05-28 DIAGNOSIS — Z95.0 CARDIAC PACEMAKER IN SITU: ICD-10-CM

## 2019-05-28 DIAGNOSIS — I44.2 AV BLOCK, COMPLETE (HCC): ICD-10-CM

## 2019-05-28 PROCEDURE — 93296 REM INTERROG EVL PM/IDS: CPT | Performed by: INTERNAL MEDICINE

## 2019-05-28 PROCEDURE — 93294 REM INTERROG EVL PM/LDLS PM: CPT | Performed by: INTERNAL MEDICINE

## 2019-06-12 ENCOUNTER — OFFICE VISIT (OUTPATIENT)
Dept: CARDIOLOGY CLINIC | Facility: HOSPITAL | Age: 84
End: 2019-06-12
Payer: MEDICARE

## 2019-06-12 VITALS
DIASTOLIC BLOOD PRESSURE: 70 MMHG | WEIGHT: 193 LBS | HEART RATE: 53 BPM | OXYGEN SATURATION: 96 % | SYSTOLIC BLOOD PRESSURE: 149 MMHG | HEIGHT: 75 IN | BODY MASS INDEX: 24 KG/M2

## 2019-06-12 DIAGNOSIS — E78.00 HYPERCHOLESTEROLEMIA: ICD-10-CM

## 2019-06-12 DIAGNOSIS — I48.91 ATRIAL FIBRILLATION, UNSPECIFIED TYPE (HCC): Primary | ICD-10-CM

## 2019-06-12 DIAGNOSIS — I47.2 NSVT (NONSUSTAINED VENTRICULAR TACHYCARDIA) (HCC): ICD-10-CM

## 2019-06-12 DIAGNOSIS — Z95.0 PRESENCE OF PERMANENT CARDIAC PACEMAKER: ICD-10-CM

## 2019-06-12 PROBLEM — I47.29 NSVT (NONSUSTAINED VENTRICULAR TACHYCARDIA): Status: ACTIVE | Noted: 2019-06-12

## 2019-06-12 PROCEDURE — 99214 OFFICE O/P EST MOD 30 MIN: CPT | Performed by: INTERNAL MEDICINE

## 2019-07-25 ENCOUNTER — OFFICE VISIT (OUTPATIENT)
Dept: INTERNAL MEDICINE CLINIC | Facility: CLINIC | Age: 84
End: 2019-07-25
Payer: MEDICARE

## 2019-07-25 VITALS
DIASTOLIC BLOOD PRESSURE: 76 MMHG | HEART RATE: 69 BPM | WEIGHT: 195.13 LBS | SYSTOLIC BLOOD PRESSURE: 124 MMHG | BODY MASS INDEX: 24.26 KG/M2 | TEMPERATURE: 96.3 F | HEIGHT: 75 IN | OXYGEN SATURATION: 98 %

## 2019-07-25 DIAGNOSIS — I47.2 NSVT (NONSUSTAINED VENTRICULAR TACHYCARDIA) (HCC): ICD-10-CM

## 2019-07-25 DIAGNOSIS — K57.90 DIVERTICULOSIS: ICD-10-CM

## 2019-07-25 DIAGNOSIS — I48.21 PERMANENT ATRIAL FIBRILLATION (HCC): ICD-10-CM

## 2019-07-25 DIAGNOSIS — I48.91 ATRIAL FIBRILLATION, UNSPECIFIED TYPE (HCC): Primary | ICD-10-CM

## 2019-07-25 DIAGNOSIS — M19.90 ARTHRITIS: ICD-10-CM

## 2019-07-25 PROBLEM — R19.7 DIARRHEA: Status: RESOLVED | Noted: 2019-04-13 | Resolved: 2019-07-25

## 2019-07-25 PROBLEM — I49.3 VENTRICULAR ECTOPY: Status: RESOLVED | Noted: 2019-04-13 | Resolved: 2019-07-25

## 2019-07-25 PROBLEM — R18.8 ASCITES: Status: RESOLVED | Noted: 2019-04-13 | Resolved: 2019-07-25

## 2019-07-25 PROBLEM — M54.50 LOWER BACK PAIN: Status: RESOLVED | Noted: 2018-01-22 | Resolved: 2019-07-25

## 2019-07-25 PROCEDURE — 99214 OFFICE O/P EST MOD 30 MIN: CPT | Performed by: INTERNAL MEDICINE

## 2019-07-25 NOTE — PROGRESS NOTES
Assessment/Plan:         Diagnoses and all orders for this visit:    Atrial fibrillation, unspecified type ([facility] )  Pt stable  Has appt with cardio beginning of August  Continue present rx    Diverticulosis  No recurrent sxs Stay hydrated Bowels are regular, fiber in diet      Arthritis  Pt remains stable and active but he has slowed down recently - he is cautious with activity eduardo in garden    NSVT (nonsustained ventricular tachycardia) ([facility] )  No issues reported  Has device check in November No reported schocks      Rto 3 months/flu shot     Patient ID: Yves Law is a 80 y o  male  HPI   Pt doing ok Bowels are overall back to normal  No dizziness or headaches No diarreha Appetite ok He is staying hydrated and has AC in his bedroom and does use when warmer  No chest pain or sob He still gardens and goes out early in the AM to check on it No falls  He is still in his own home and manages ok but he is slowing down in recent months His other son is expecting him to visit in [place] so he seems to be considering      Review of Systems   Constitutional: Positive for activity change  Slowing down a bit   HENT: Negative  Respiratory: Negative  Cardiovascular: Negative  Negative for palpitations and leg swelling  Gastrointestinal: Negative  Genitourinary: Negative  Musculoskeletal: Positive for arthralgias  Skin: Negative  Neurological: Negative for dizziness and headaches  Hematological: Negative  Psychiatric/Behavioral: Negative        Past Medical History:   Diagnosis Date    Arthritis     Atrial fibrillation (HCC)     Last Assessed:8/10/17    Benign prostatic hyperplasia     Last Assessed:1/28/14    BPH (benign prostatic hyperplasia)     Complete atrioventricular block (HCC)     Diverticulosis     Last Assessed:4/30/13    Hypercholesterolemia     Hypertension     Paroxysmal ventricular tachycardia (HCC)     Last Assessed:7/20/17    Prostatitis     Last Assessed:12/19/12    Pulmonary artery hypertension (HCC)     mild pulmonary htn    Right bundle branch block     Last Assessed:11/26/13    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     Last ZUPBJPRAQ:46/9/92     Past Surgical History:   Procedure Laterality Date    CARDIAC PACEMAKER PLACEMENT      St  Judes DC PPM    COLONOSCOPY      HEMORRHOID SURGERY      Cauterization of hemorrhoids    HERNIA REPAIR      INCISION AND DRAINAGE ABSCESS / HEMATOMA OF BURSA / KNEE / [address]      Fatty Tumor Removed    KNEE SURGERY Left     Steel Removes from left knee     Social History     Socioeconomic History    Marital status:      Spouse name: Not on file    Number of children: Not on file    Years of education: Not on file    Highest education level: Not on file   Occupational History    Occupation: retired   Social Needs    Financial resource strain: Not on file    Food insecurity:     Worry: Not on file     Inability: Not on file   [employer] needs:     Medical: Not on file     Non-medical: Not on file   Tobacco Use    Smoking status: Former Smoker    Smokeless tobacco: Never Used   Substance and Sexual Activity    Alcohol use:  Yes     Alcohol/week: 0 0 standard drinks     Frequency: Monthly or less     Binge frequency: Never     Comment: social drinker    Drug use: No    Sexual activity: Not on file   Lifestyle    Physical activity:     Days per week: Not on file     Minutes per session: Not on file    Stress: Not on file   Relationships    Social connections:     Talks on phone: Not on file     Gets together: Not on file     Attends Judaism service: Not on file     Active member of club or organization: Not on file     Attends meetings of clubs or organizations: Not on file     Relationship status: Not on file    Intimate partner violence:     Fear of current or ex partner: Not on file     Emotionally abused: Not on file     Physically abused: Not on file     Forced sexual activity: Not on file   Other Topics Concern    Not on file   Social History Narrative    Advanced directive discussed with patient    Always uses seatbelt    Always uses sunscreen    Caffeine use    Regular dental care     Allergies   Allergen Reactions    Ibuprofen Hives               /76   Pulse 69   Temp (!) 96 3 °F (35 7 °C) (Tympanic)   Ht 6' 3" (1 905 m)   Wt 88 5 kg (195 lb 2 oz)   SpO2 98%   BMI 24 39 kg/m²          Physical Exam   Constitutional: He is oriented to person, place, and time  He appears well-developed and well-nourished  No distress  HENT:   Head: Normocephalic and atraumatic  Right Ear: External ear normal    Left Ear: External ear normal    Nose: Nose normal    Mouth/Throat: Oropharynx is clear and moist  No oropharyngeal exudate  Eyes: Pupils are equal, round, and reactive to light  Conjunctivae and EOM are normal  No scleral icterus  Neck: Normal range of motion  Neck supple  No JVD present  Cardiovascular: Normal rate and intact distal pulses  Murmur heard  Pulmonary/Chest: Effort normal and breath sounds normal  No respiratory distress  He has no wheezes  Abdominal: Soft  Bowel sounds are normal  He exhibits no distension  There is no tenderness  Musculoskeletal: Normal range of motion  He exhibits deformity  He exhibits no edema  Lymphadenopathy:     He has no cervical adenopathy  Neurological: He is alert and oriented to person, place, and time  He displays normal reflexes  No cranial nerve deficit  He exhibits normal muscle tone  Coordination normal    Skin: Skin is warm  He is not diaphoretic  Psychiatric: He has a normal mood and affect  His behavior is normal  Judgment and thought content normal    Nursing note and vitals reviewed

## 2019-08-11 DIAGNOSIS — I10 ESSENTIAL HYPERTENSION: ICD-10-CM

## 2019-08-11 RX ORDER — METOPROLOL SUCCINATE 50 MG/1
TABLET, EXTENDED RELEASE ORAL
Qty: 135 TABLET | Refills: 3 | Status: SHIPPED | OUTPATIENT
Start: 2019-08-11 | End: 2020-08-03

## 2019-08-20 ENCOUNTER — IN-CLINIC DEVICE VISIT (OUTPATIENT)
Dept: CARDIOLOGY CLINIC | Facility: HOSPITAL | Age: 84
End: 2019-08-20
Payer: MEDICARE

## 2019-08-20 DIAGNOSIS — Z95.0 PRESENCE OF CARDIAC PACEMAKER: Primary | ICD-10-CM

## 2019-08-20 DIAGNOSIS — I48.19 PERSISTENT ATRIAL FIBRILLATION (HCC): ICD-10-CM

## 2019-08-20 DIAGNOSIS — I44.30 AVB (ATRIOVENTRICULAR BLOCK): ICD-10-CM

## 2019-08-20 PROCEDURE — 93279 PRGRMG DEV EVAL PM/LDLS PM: CPT | Performed by: INTERNAL MEDICINE

## 2019-08-20 NOTE — PROGRESS NOTES
Results for orders placed or performed in visit on 08/20/19   Cardiac EP device report    Narrative    SJM-DUAL CHAMBER PPM (VVIR 60)  DEVICE INTERROGATED IN [facility]: BATTERY VOLTAGE ADEQUATE (6 8 YRS)  : 92% (>40%~DEPENDent)  ALL LEAD PARAMETERS WITHIN NORMAL LIMITS  NO SIGNIFICANT HIGH RATE EPISODES  NO PROGRAMMING CHANGES MADE TO DEVICE PARAMETERS  PACEMAKER FUNCTIONING APPROPRIATELY    [address] [place] [address]

## 2019-10-16 ENCOUNTER — HOSPITAL ENCOUNTER (EMERGENCY)
Facility: HOSPITAL | Age: 84
Discharge: HOME | End: 2019-10-16
Attending: EMERGENCY MEDICINE | Admitting: EMERGENCY MEDICINE
Payer: MEDICARE

## 2019-10-16 ENCOUNTER — TELEPHONE (OUTPATIENT)
Dept: UROLOGY | Facility: MEDICAL CENTER | Age: 84
End: 2019-10-16

## 2019-10-16 VITALS
DIASTOLIC BLOOD PRESSURE: 72 MMHG | BODY MASS INDEX: 23.46 KG/M2 | TEMPERATURE: 98.5 F | SYSTOLIC BLOOD PRESSURE: 168 MMHG | WEIGHT: 188.71 LBS | OXYGEN SATURATION: 96 % | HEART RATE: 60 BPM | HEIGHT: 75 IN | RESPIRATION RATE: 18 BRPM

## 2019-10-16 DIAGNOSIS — R33.8 ACUTE URINARY RETENTION: Primary | ICD-10-CM

## 2019-10-16 DIAGNOSIS — N39.0 UTI (URINARY TRACT INFECTION): ICD-10-CM

## 2019-10-16 LAB
ANION GAP SERPL CALCULATED.3IONS-SCNC: 5 MMOL/L (ref 4–13)
BACTERIA UR QL AUTO: ABNORMAL /HPF
BASOPHILS # BLD AUTO: 0.04 THOUSANDS/ΜL (ref 0–0.1)
BASOPHILS NFR BLD AUTO: 0 % (ref 0–1)
BILIRUB UR QL STRIP: NEGATIVE
BUN SERPL-MCNC: 19 MG/DL (ref 5–25)
CALCIUM SERPL-MCNC: 9 MG/DL (ref 8.3–10.1)
CHLORIDE SERPL-SCNC: 102 MMOL/L (ref 100–108)
CLARITY UR: ABNORMAL
CO2 SERPL-SCNC: 28 MMOL/L (ref 21–32)
COLOR UR: ABNORMAL
CREAT SERPL-MCNC: 1.15 MG/DL (ref 0.6–1.3)
EOSINOPHIL # BLD AUTO: 0.02 THOUSAND/ΜL (ref 0–0.61)
EOSINOPHIL NFR BLD AUTO: 0 % (ref 0–6)
ERYTHROCYTE [DISTWIDTH] IN BLOOD BY AUTOMATED COUNT: 14.1 % (ref 11.6–15.1)
GFR SERPL CREATININE-BSD FRML MDRD: 55 ML/MIN/1.73SQ M
GLUCOSE SERPL-MCNC: 91 MG/DL (ref 65–140)
GLUCOSE UR STRIP-MCNC: NEGATIVE MG/DL
HCT VFR BLD AUTO: 43.1 % (ref 36.5–49.3)
HGB BLD-MCNC: 14.2 G/DL (ref 12–17)
HGB UR QL STRIP.AUTO: ABNORMAL
IMM GRANULOCYTES # BLD AUTO: 0.09 THOUSAND/UL (ref 0–0.2)
IMM GRANULOCYTES NFR BLD AUTO: 1 % (ref 0–2)
KETONES UR STRIP-MCNC: NEGATIVE MG/DL
LEUKOCYTE ESTERASE UR QL STRIP: ABNORMAL
LYMPHOCYTES # BLD AUTO: 0.65 THOUSANDS/ΜL (ref 0.6–4.47)
LYMPHOCYTES NFR BLD AUTO: 5 % (ref 14–44)
MAGNESIUM SERPL-MCNC: 2.3 MG/DL (ref 1.6–2.6)
MCH RBC QN AUTO: 30.5 PG (ref 26.8–34.3)
MCHC RBC AUTO-ENTMCNC: 32.9 G/DL (ref 31.4–37.4)
MCV RBC AUTO: 93 FL (ref 82–98)
MONOCYTES # BLD AUTO: 0.86 THOUSAND/ΜL (ref 0.17–1.22)
MONOCYTES NFR BLD AUTO: 7 % (ref 4–12)
NEUTROPHILS # BLD AUTO: 10.29 THOUSANDS/ΜL (ref 1.85–7.62)
NEUTS SEG NFR BLD AUTO: 87 % (ref 43–75)
NITRITE UR QL STRIP: POSITIVE
NON-SQ EPI CELLS URNS QL MICRO: ABNORMAL /HPF
NRBC BLD AUTO-RTO: 0 /100 WBCS
PH UR STRIP.AUTO: 6 [PH]
PLATELET # BLD AUTO: 158 THOUSANDS/UL (ref 149–390)
PMV BLD AUTO: 10.1 FL (ref 8.9–12.7)
POTASSIUM SERPL-SCNC: 4.7 MMOL/L (ref 3.5–5.3)
PROT UR STRIP-MCNC: ABNORMAL MG/DL
RBC # BLD AUTO: 4.66 MILLION/UL (ref 3.88–5.62)
RBC #/AREA URNS AUTO: ABNORMAL /HPF
SODIUM SERPL-SCNC: 135 MMOL/L (ref 136–145)
SP GR UR STRIP.AUTO: 1.02 (ref 1–1.03)
UROBILINOGEN UR QL STRIP.AUTO: 0.2 E.U./DL
WBC # BLD AUTO: 11.95 THOUSAND/UL (ref 4.31–10.16)
WBC #/AREA URNS AUTO: ABNORMAL /HPF

## 2019-10-16 PROCEDURE — 99283 EMERGENCY DEPT VISIT LOW MDM: CPT

## 2019-10-16 PROCEDURE — 87077 CULTURE AEROBIC IDENTIFY: CPT | Performed by: EMERGENCY MEDICINE

## 2019-10-16 PROCEDURE — 83735 ASSAY OF MAGNESIUM: CPT | Performed by: EMERGENCY MEDICINE

## 2019-10-16 PROCEDURE — 87086 URINE CULTURE/COLONY COUNT: CPT | Performed by: EMERGENCY MEDICINE

## 2019-10-16 PROCEDURE — 36415 COLL VENOUS BLD VENIPUNCTURE: CPT | Performed by: EMERGENCY MEDICINE

## 2019-10-16 PROCEDURE — 80048 BASIC METABOLIC PNL TOTAL CA: CPT | Performed by: EMERGENCY MEDICINE

## 2019-10-16 PROCEDURE — 99283 EMERGENCY DEPT VISIT LOW MDM: CPT | Performed by: EMERGENCY MEDICINE

## 2019-10-16 PROCEDURE — 87186 SC STD MICRODIL/AGAR DIL: CPT | Performed by: EMERGENCY MEDICINE

## 2019-10-16 PROCEDURE — 81001 URINALYSIS AUTO W/SCOPE: CPT | Performed by: EMERGENCY MEDICINE

## 2019-10-16 PROCEDURE — 85025 COMPLETE CBC W/AUTO DIFF WBC: CPT | Performed by: EMERGENCY MEDICINE

## 2019-10-16 RX ORDER — OXYCODONE HYDROCHLORIDE AND ACETAMINOPHEN 5; 325 MG/1; MG/1
1 TABLET ORAL ONCE
Status: DISCONTINUED | OUTPATIENT
Start: 2019-10-16 | End: 2019-10-16 | Stop reason: HOSPADM

## 2019-10-16 RX ORDER — CIPROFLOXACIN 500 MG/1
500 TABLET, FILM COATED ORAL EVERY 12 HOURS
Qty: 14 TABLET | Refills: 0 | Status: SHIPPED | OUTPATIENT
Start: 2019-10-16 | End: 2019-10-23

## 2019-10-16 RX ORDER — CIPROFLOXACIN 500 MG/1
500 TABLET, FILM COATED ORAL ONCE
Status: COMPLETED | OUTPATIENT
Start: 2019-10-16 | End: 2019-10-16

## 2019-10-16 RX ORDER — LIDOCAINE HYDROCHLORIDE 20 MG/ML
1 JELLY TOPICAL ONCE
Status: COMPLETED | OUTPATIENT
Start: 2019-10-16 | End: 2019-10-16

## 2019-10-16 RX ADMIN — CIPROFLOXACIN HYDROCHLORIDE 500 MG: 500 TABLET, FILM COATED ORAL at 10:41

## 2019-10-16 RX ADMIN — LIDOCAINE HYDROCHLORIDE 1 APPLICATION: 20 JELLY TOPICAL at 08:37

## 2019-10-16 NOTE — TELEPHONE ENCOUNTER
Please Triage - ER Follow Up Appt  New Patient - KHALIF CALDERON [facility]     What is the reason for the patients appointment? Seen at [facility] on 10/16 for Urinary Retention  Discharged with Catheter  Do we accept the patient's insurance or is the patient Self-Pay  Medicare & Aetna     Has the patient had any previous urologist(s)?  no     Has the patients records been requested? No     Has the patient had any outside testing done? No     What is the patients location preference for an office visit? [place]     Does the patient have a personal history of cancer?   No     Patient can be reached at [phone]

## 2019-10-16 NOTE — ED NOTES
Peña cath slipped out  Balloon deflated       Shabana Ye, SHANNAN  10/16/19 [address], RN  10/16/19 7559

## 2019-10-16 NOTE — ED PROVIDER NOTES
History  Chief Complaint   Patient presents with    Urinary Retention     patient has increased urge to void, voids little amounts at a time, and has burning upon urination      Patient: Fozia Johnson  89 y o /male  YOB: 1928  MRN: [id number]  PCP: Colonel Michelle DO  Date of evaluation: 10/16/2019    (N SULTANA Renene Hedger may have been used in the preparation of this document  Occasional wrong word or "sound-alike" substitutions may have occurred due to the inherent limitations of voice recognition software  Interpretation should be guided by context )    History provided by:  Patient  Difficulty Urinating   Presenting symptoms: dysuria    Presenting symptoms comment:  Unable to urinate more than a dribble since last night  Relieved by:  Nothing  Worsened by:  Nothing  Ineffective treatments:  None tried  Associated symptoms: abdominal pain and urinary retention    Associated symptoms: no diarrhea, no fever, no flank pain, no nausea, no penile redness, no penile swelling and no vomiting        Prior to Admission Medications   Prescriptions Last Dose Informant Patient Reported? Taking?    Ascorbic Acid (VITAMIN C) 1000 MG tablet 10/16/2019 at Unknown time Self Yes Yes   Sig: Take 1 tablet by mouth daily   Cholecalciferol (VITAMIN D3) 1000 units CAPS 10/16/2019 at Unknown time Self Yes Yes   Sig: Take 1 tablet by mouth daily   Multiple Vitamins-Minerals (ICAPS AREDS 2 PO) 10/16/2019 at Unknown time Self Yes Yes   Sig: Take by mouth   Red Yeast Rice 600 MG TABS 10/16/2019 at Unknown time Self Yes Yes   Sig: Take by mouth daily   aspirin 325 mg tablet 10/15/2019 at Unknown time Self Yes Yes   Sig: Take 1 tablet by mouth daily   cyanocobalamin (VITAMIN B-12) 100 mcg tablet 10/16/2019 at Unknown time Self Yes Yes   Sig: Take 1 tablet by mouth daily   metoprolol succinate (TOPROL-XL) 50 mg 24 hr tablet 10/16/2019 at Unknown time  No Yes   Sig: TAKE 1 5 TABLET DAILY   tamsulosin (FLOMAX) 0 4 mg   No No   Sig: Take 1 capsule (0 4 mg total) by mouth daily for 90 days      Facility-Administered Medications: None       Past Medical History:   Diagnosis Date    Arthritis     Atrial fibrillation (HCC)     Last Assessed:8/10/17    Benign prostatic hyperplasia     Last Assessed:1/28/14    BPH (benign prostatic hyperplasia)     Complete atrioventricular block (HCC)     Diverticulosis     Last Assessed:4/30/13    Hypercholesterolemia     Hypertension     Paroxysmal ventricular tachycardia (HCC)     Last Assessed:7/20/17    Prostatitis     Last Assessed:12/19/12    Pulmonary artery hypertension (HCC)     mild pulmonary htn    Right bundle branch block     Last Assessed:11/26/13    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     Last ZOPJUXZSS:46/7/21       Past Surgical History:   Procedure Laterality Date    CARDIAC PACEMAKER PLACEMENT      St  Judes DC PPM    COLONOSCOPY      HEMORRHOID SURGERY      Cauterization of hemorrhoids    HERNIA REPAIR      INCISION AND DRAINAGE ABSCESS / HEMATOMA OF BURSA / KNEE / THIGH      Fatty Tumor Removed    KNEE SURGERY Left     Steel Removes from left knee       Family History   Problem Relation Age of Onset    Hypertension Mother     Stroke Mother         Stroke Syndrome    Other Sister         pacemaker placement    Prostate cancer Brother     Diabetes Family      I have reviewed and agree with the history as documented  Social History     Tobacco Use    Smoking status: Former Smoker    Smokeless tobacco: Never Used   Substance Use Topics    Alcohol use: Not Currently     Alcohol/week: 0 0 standard drinks     Frequency: Monthly or less     Binge frequency: Never     Comment: social drinker    Drug use: No        Review of Systems   Constitutional: Negative  Negative for chills and fever  HENT: Negative  Negative for trouble swallowing and voice change  Eyes: Negative for photophobia and visual disturbance  Respiratory: Negative  Negative for cough and shortness of breath  Cardiovascular: Negative  Negative for chest pain and palpitations  Gastrointestinal: Positive for abdominal pain  Negative for diarrhea, nausea and vomiting  Endocrine: Negative  Negative for polydipsia and polyuria  Genitourinary: Positive for decreased urine volume, difficulty urinating, dysuria and urgency  Negative for flank pain and penile swelling  Musculoskeletal: Negative  Negative for back pain and neck pain  Skin: Negative  Negative for rash and wound  Allergic/Immunologic: Negative for immunocompromised state  Neurological: Negative  Negative for speech difficulty and weakness  Hematological: Negative  Negative for adenopathy  Does not bruise/bleed easily  All other systems reviewed and are negative  Physical Exam  Physical Exam   Constitutional: He is oriented to person, place, and time  He appears well-developed and well-nourished  HENT:   Mouth/Throat: Oropharynx is clear and moist and mucous membranes are normal    Voice normal   Eyes: Pupils are equal, round, and reactive to light  EOM are normal    Cardiovascular: Normal rate and regular rhythm  Pulmonary/Chest: Effort normal    Abdominal: Soft  Bowel sounds are normal  He exhibits distension (suprapubic)  Neurological: He is alert and oriented to person, place, and time  GCS eye subscore is 4  GCS verbal subscore is 5  GCS motor subscore is 6  Skin: Skin is warm and dry  Psychiatric: He has a normal mood and affect  His speech is normal and behavior is normal    Nursing note and vitals reviewed        Vital Signs  ED Triage Vitals [10/16/19 0804]   Temperature Pulse Respirations Blood Pressure SpO2   98 5 °F (36 9 °C) 71 18 166/71 95 %      Temp Source Heart Rate Source Patient Position - Orthostatic VS BP Location FiO2 (%)   Temporal Monitor Sitting Left arm --      Pain Score       --           Vitals:    10/16/19 0804 10/16/19 0809   BP: 166/71 144/65   Pulse: 71    Patient Position - Orthostatic VS: Sitting          Visual Acuity      ED Medications  Medications   lidocaine (URO-JET) 2 % urethral/mucosal gel 1 application (has no administration in time range)       Diagnostic Studies  Results Reviewed     Procedure Component Value Units Date/Time    UA w Reflex to Microscopic w Reflex to Culture [[id number]] Collected:  10/16/19 0817    Lab Status:  No result Specimen:  Urine, Clean Catch                  No orders to display              Procedures  Procedures       ED Course  ED Course as of Oct 18 0701   Wed Oct 16, 2019   0853 Leukocytes, UA(!): Large   0854 Nitrite, UA(!): Positive   0854 Blood, UA(!): Large   0854 WBC, UA(!): Innumerable   0854 Bacteria, UA(!): Innumerable   1009 BUN: 19   1009 Creatinine: 1 15                               MDM  Number of Diagnoses or Management Options     Amount and/or Complexity of Data Reviewed  Tests in the medicine section of CPT®: ordered and reviewed        Disposition  Final diagnoses:   None     ED Disposition     None      Follow-up Information    None         Patient's Medications   Discharge Prescriptions    No medications on file     No discharge procedures on file      ED Provider  Electronically Signed by           Brittanie Cervantes MD  10/18/19 7649

## 2019-10-17 ENCOUNTER — HOSPITAL ENCOUNTER (EMERGENCY)
Facility: HOSPITAL | Age: 84
Discharge: HOME | End: 2019-10-17
Attending: EMERGENCY MEDICINE
Payer: MEDICARE

## 2019-10-17 ENCOUNTER — PATIENT OUTREACH (OUTPATIENT)
Dept: CASE MANAGEMENT | Facility: OTHER | Age: 84
End: 2019-10-17

## 2019-10-17 VITALS
HEIGHT: 75 IN | HEART RATE: 68 BPM | DIASTOLIC BLOOD PRESSURE: 75 MMHG | OXYGEN SATURATION: 94 % | TEMPERATURE: 97.8 F | WEIGHT: 194.45 LBS | SYSTOLIC BLOOD PRESSURE: 178 MMHG | RESPIRATION RATE: 18 BRPM | BODY MASS INDEX: 24.18 KG/M2

## 2019-10-17 DIAGNOSIS — T83.9XXA FOLEY CATHETER PROBLEM (HCC): Primary | ICD-10-CM

## 2019-10-17 PROCEDURE — 99283 EMERGENCY DEPT VISIT LOW MDM: CPT | Performed by: EMERGENCY MEDICINE

## 2019-10-17 PROCEDURE — 99283 EMERGENCY DEPT VISIT LOW MDM: CPT

## 2019-10-17 NOTE — DISCHARGE INSTRUCTIONS
Please follow-up with the primary care provider  Please call the urology office, anything changes or worsens, please return to the emergency department

## 2019-10-17 NOTE — TELEPHONE ENCOUNTER
Contacted and spoke with patient to schedule office visit with our office  Discussed with patient, Itzel's recommendations schedule visit in 7- 10 days for void trial   Patient wants the catheter out today   Patient will go back to the ER for guerrero removal

## 2019-10-17 NOTE — ED NOTES
Patient resting comfortably in bed  Call bell within reach       Joselyn Donnelly RN  10/17/19 [address] Holly Cho RN  10/17/19 8773

## 2019-10-17 NOTE — CASE MANAGEMENT
I self referred the patient to discuss resources that might be available to him  He denied needing any help at this time  The patient lives alone in a two story home  There is one flight of stairs in the home  He has no medical equipment  He does not receive meals on wheels or home health services at this time  He takes care of his own ADL's and he drives  He uses [facility] in [place] [address]  He has no trouble getting or paying for his medications  He has Vinnie Brown  The patient needs a follow up appointment with Urology which I will make today

## 2019-10-18 LAB — BACTERIA UR CULT: ABNORMAL

## 2019-10-19 NOTE — ED PROVIDER NOTES
History  Chief Complaint   Patient presents with    Urinary Catheter Insertion or Check     states catheter inserted by ED 2 days ago, called urology and told to come to ED for removal     HPI  59-year-old male who was here less than 24 hours ago for urinary retention  He was found to have a urinary tract infection and was placed on antibiotics  He also had a Peña catheter placed at that time  Since then, patient has had a leg bag  Denies any pain in his abdomen penis  Does state that he does not walk catheter in there  He got below the Urology office did not have an appointment today  Patient was insisting on getting the catheter out and they told him to come to the emergency department  Patient told staff here that neurology told him to come in to get his catheter removed  Patient denies fevers chills sweats chest pain abdominal pain  Prior to Admission Medications   Prescriptions Last Dose Informant Patient Reported? Taking?    Ascorbic Acid (VITAMIN C) 1000 MG tablet  Self Yes No   Sig: Take 1 tablet by mouth daily   Cholecalciferol (VITAMIN D3) 1000 units CAPS  Self Yes No   Sig: Take 1 tablet by mouth daily   Multiple Vitamins-Minerals (ICAPS AREDS 2 PO)  Self Yes No   Sig: Take by mouth   Red Yeast Rice 600 MG TABS  Self Yes No   Sig: Take by mouth daily   aspirin 325 mg tablet  Self Yes No   Sig: Take 1 tablet by mouth daily   ciprofloxacin (CIPRO) 500 mg tablet   No No   Sig: Take 1 tablet (500 mg total) by mouth every 12 (twelve) hours for 7 days   cyanocobalamin (VITAMIN B-12) 100 mcg tablet  Self Yes No   Sig: Take 1 tablet by mouth daily   metoprolol succinate (TOPROL-XL) 50 mg 24 hr tablet   No No   Sig: TAKE 1 5 TABLET DAILY   tamsulosin (FLOMAX) 0 4 mg   No No   Sig: Take 1 capsule (0 4 mg total) by mouth daily for 90 days      Facility-Administered Medications: None       Past Medical History:   Diagnosis Date    Arthritis     Atrial fibrillation (HCC)     Last Assessed:8/10/17    Benign prostatic hyperplasia     Last Assessed:14    BPH (benign prostatic hyperplasia)     Complete atrioventricular block (HCC)     Diverticulosis     Last Assessed:13    Hypercholesterolemia     Hypertension     Paroxysmal ventricular tachycardia (HCC)     Last Assessed:17    Prostatitis     Last Assessed:12    Pulmonary artery hypertension (HCC)     mild pulmonary htn    Right bundle branch block     Last Assessed:13    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     Last [id number]       Past Surgical History:   Procedure Laterality Date    CARDIAC PACEMAKER PLACEMENT      St  Judes DC PPM    COLONOSCOPY      HEMORRHOID SURGERY      Cauterization of hemorrhoids    HERNIA REPAIR      INCISION AND DRAINAGE ABSCESS / HEMATOMA OF BURSA / KNEE / THIGH      Fatty Tumor Removed    KNEE SURGERY Left     Steel Removes from left knee       Family History   Problem Relation Age of Onset    Hypertension Mother     Stroke Mother         Stroke Syndrome    Other Sister         pacemaker placement    Prostate cancer Brother     Diabetes Family      I have reviewed and agree with the history as documented  Social History     Tobacco Use    Smoking status: Former Smoker    Smokeless tobacco: Never Used   Substance Use Topics    Alcohol use: Not Currently     Alcohol/week: 0 0 standard drinks     Frequency: Monthly or less     Binge frequency: Never     Comment: social drinker    Drug use: No        Review of Systems   Constitutional: Negative  Negative for chills and fever  HENT: Negative  Negative for ear pain and sore throat  Eyes: Negative  Negative for pain and discharge  Respiratory: Negative  Negative for chest tightness and shortness of breath  Cardiovascular: Negative  Negative for chest pain and palpitations  Gastrointestinal: Negative  Negative for abdominal pain, nausea and vomiting  Endocrine: Negative  Negative for polyphagia and polyuria  Genitourinary: Negative  Negative for dysuria and flank pain  Musculoskeletal: Negative  Negative for arthralgias and back pain  Skin: Negative  Negative for color change and wound  Allergic/Immunologic: Negative  Negative for food allergies and immunocompromised state  Neurological: Negative  Negative for weakness and headaches  Hematological: Negative  Negative for adenopathy  Does not bruise/bleed easily  Psychiatric/Behavioral: Negative  Negative for suicidal ideas  The patient is not nervous/anxious  Physical Exam  Physical Exam   Constitutional: He is oriented to person, place, and time  He appears well-developed and well-nourished  No distress  HENT:   Head: Normocephalic and atraumatic  Right Ear: External ear normal    Left Ear: External ear normal    Mouth/Throat: Oropharynx is clear and moist    Eyes: Pupils are equal, round, and reactive to light  Conjunctivae and EOM are normal  Right eye exhibits no discharge  Left eye exhibits no discharge  No scleral icterus  Neck: Normal range of motion  Neck supple  No tracheal deviation present  No thyromegaly present  Cardiovascular: Normal rate, regular rhythm and intact distal pulses  Exam reveals no gallop and no friction rub  No murmur heard  Pulmonary/Chest: Effort normal and breath sounds normal  No stridor  No respiratory distress  He has no wheezes  He has no rales  Abdominal: Soft  Bowel sounds are normal  He exhibits no distension  There is no tenderness  There is no rebound and no guarding  Musculoskeletal: Normal range of motion  He exhibits no edema or deformity  Neurological: He is alert and oriented to person, place, and time  No cranial nerve deficit  Skin: Skin is warm and dry  No rash noted  He is not diaphoretic  No erythema  Psychiatric: He has a normal mood and affect  His behavior is normal  Thought content normal    Nursing note and vitals reviewed        Vital Signs  ED Triage Vitals [10/17/19 1342]   Temperature Pulse Respirations Blood Pressure SpO2   97 8 °F (36 6 °C) 68 18 (!) 178/75 94 %      Temp Source Heart Rate Source Patient Position - Orthostatic VS BP Location FiO2 (%)   Temporal Monitor Sitting Right arm --      Pain Score       No Pain           Vitals:    10/17/19 1342   BP: (!) 178/75   Pulse: 68   Patient Position - Orthostatic VS: Sitting         Visual Acuity      ED Medications  Medications - No data to display    Diagnostic Studies  Results Reviewed     None                 No orders to display              Procedures  Procedures       ED Course  ED Course as of Oct 19 1329   Thu Oct 17, 2019   1410 Case discussed with on-call urology PA  Patient should keep the catheter in for 7 days  Will give patient phone number to make an appointment for 1 week  Patient will continue with antibiotics  Patient is in agreement this plan    I personally discussed return precautions with this patient and family  I provided the patient with written discharge instructions and particularly highlighted specific areas of interest to this patient, including but not limited to: medications for symptom managment, follow up recommendations, and return precautions  Patient and family are in agreement with this plan as outlined above  MDM  Number of Diagnoses or Management Options  Peña catheter problem [facility]):   Diagnosis management comments: Case was discussed with the on-call urology PA  Urology was never told patient to get the catheter removed less than 24 hours after had been put in  When I discussed this with the patient  He does states that he wants it removed  I discussed with the patient that given his urinary tract infection, the 5 pulled out the catheter and he retains again, he could have worsening of his infection, he could have urosepsis  Patient follow-up in neurology clinic in 7-10 days    Patient was in agreement with this plan but states that regardless what happens, he will get the catheter taken out in 7 days  I told patient that was his right as long as he made an informed decision  ED now get her helped to get patient Urology appointment  Patient again was in agreement with this plan and was discharged  Disposition  Final diagnoses: Peña catheter problem [facility])     Time reflects when diagnosis was documented in both MDM as applicable and the Disposition within this note     Time User Action Codes Description Comment    10/17/2019  2:10 PM Nicholas Carr  9XXA] Peña catheter problem [facility])       ED Disposition     ED Disposition Condition Date/Time Comment    Discharge Stable Thu Oct 17, 2019  2:10 PM Aneesh Winchester discharge to home/self care  Follow-up Information     Follow up With Specialties Details Why Contact Info Additional DO [facility] Schedule an appointment as soon as possible for a visit in 1 week As needed, If symptoms worsen [facility] 1  [facility] Emergency Department Emergency Medicine Go to  As needed, If symptoms worsen [address] [id number]  [phone] [place] ED, [address], [address] [facility] [place] Call today To make an appointment for 1 week   [address] [facility] [place], [address] [place] [address], [address] [place] [address]          Discharge Medication List as of 10/17/2019  2:11 PM      CONTINUE these medications which have NOT CHANGED    Details   Ascorbic Acid (VITAMIN C) 1000 MG tablet Take 1 tablet by mouth daily, Historical Med      aspirin 325 mg tablet Take 1 tablet by mouth daily, Historical Med      Cholecalciferol (VITAMIN D3) 1000 units CAPS Take 1 tablet by mouth daily, Historical Med      ciprofloxacin (CIPRO) 500 mg tablet Take 1 tablet (500 mg total) by mouth every 12 (twelve) hours for 7 days, Starting Wed 10/16/2019, Until Wed 10/23/2019, Normal      cyanocobalamin (VITAMIN B-12) 100 mcg tablet Take 1 tablet by mouth daily, Historical Med      metoprolol succinate (TOPROL-XL) 50 mg 24 hr tablet TAKE 1 5 TABLET DAILY, Normal      Multiple Vitamins-Minerals (ICAPS AREDS 2 PO) Take by mouth, Historical Med      Red Yeast Rice 600 MG TABS Take by mouth daily, Historical Med      tamsulosin (FLOMAX) 0 4 mg Take 1 capsule (0 4 mg total) by mouth daily for 90 days, Starting Mon 1/21/2019, Until Wed 6/12/2019, Normal           No discharge procedures on file      ED Provider  Electronically Signed by           Tiera Cheng MD  10/19/19 6233

## 2019-10-22 ENCOUNTER — OFFICE VISIT (OUTPATIENT)
Dept: UROLOGY | Facility: CLINIC | Age: 84
End: 2019-10-22
Payer: MEDICARE

## 2019-10-22 VITALS
HEART RATE: 64 BPM | SYSTOLIC BLOOD PRESSURE: 130 MMHG | WEIGHT: 198.41 LBS | BODY MASS INDEX: 24.67 KG/M2 | RESPIRATION RATE: 20 BRPM | HEIGHT: 75 IN | DIASTOLIC BLOOD PRESSURE: 70 MMHG

## 2019-10-22 DIAGNOSIS — R33.9 URINARY RETENTION: Primary | ICD-10-CM

## 2019-10-22 DIAGNOSIS — N40.1 BENIGN PROSTATIC HYPERPLASIA WITH LOWER URINARY TRACT SYMPTOMS, SYMPTOM DETAILS UNSPECIFIED: ICD-10-CM

## 2019-10-22 DIAGNOSIS — N48.1 BALANITIS: ICD-10-CM

## 2019-10-22 LAB — POST-VOID RESIDUAL VOLUME, ML POC: 255 ML

## 2019-10-22 PROCEDURE — 51798 US URINE CAPACITY MEASURE: CPT | Performed by: PHYSICIAN ASSISTANT

## 2019-10-22 PROCEDURE — 99214 OFFICE O/P EST MOD 30 MIN: CPT | Performed by: PHYSICIAN ASSISTANT

## 2019-10-22 RX ORDER — CLOTRIMAZOLE AND BETAMETHASONE DIPROPIONATE 10; .64 MG/G; MG/G
CREAM TOPICAL 2 TIMES DAILY
Qty: 30 G | Refills: 0 | Status: SHIPPED | OUTPATIENT
Start: 2019-10-22 | End: 2020-02-28 | Stop reason: ALTCHOICE

## 2019-10-22 RX ORDER — FINASTERIDE 5 MG/1
5 TABLET, FILM COATED ORAL DAILY
Qty: 90 TABLET | Refills: 1 | Status: SHIPPED | OUTPATIENT
Start: 2019-10-22 | End: 2020-04-17 | Stop reason: SDUPTHER

## 2019-10-23 NOTE — PROGRESS NOTES
10/22/2019      Chief Complaint   Patient presents with    Urinary Retention    Void Trial         Assessment and Plan    80 y o  male managed by NEW PATIENT    1  Urinary retention  - E coli UTI treated with Cipro  - Peña catheter for unknown retention volume  - successful void trial today  - continue Flomax 0 4 mg nightly  - start Proscar 5 mg daily  - return in 2-3 weeks for PVR with RN (<400ml pass)    2  Balanitis  - start lotrisone bid x 2 weeks    FU 3 months    History of Present Illness  Maurice Trujillo is a 80 y o  male here for evaluation of ER follow-up urinary retention and UTI  Patient with history of BPH is taken Flomax 0 4 mg nightly for many years through primary care  Last week developed dysuria and dribbling  Present to the emergency department, Peña catheter was placed for retention of unknown volume  Urinalysis and culture was positive for E coli UTI  Was treated with Cipro, he will complete his antibiotics tomorrow  Peña catheter was removed in the office this morning  He has voided 3 times throughout the day today between 100-200 mL each time  He voided 90 mL right here in the office  His bladder scan is 225  He feels comfortable  The tip of the penis is red, but not tender  Will treat for balanitis  Review of Systems   Constitutional: Negative for activity change, appetite change, chills, fever and unexpected weight change  HENT: Negative  Respiratory: Negative  Negative for shortness of breath  Cardiovascular: Negative  Negative for chest pain  Gastrointestinal: Negative for abdominal pain, diarrhea, nausea and vomiting  Endocrine: Negative  Genitourinary: Positive for difficulty urinating and dysuria  Negative for decreased urine volume, flank pain, frequency, hematuria and urgency  Musculoskeletal: Negative for back pain and gait problem  Skin: Negative  Allergic/Immunologic: Negative  Neurological: Negative      Hematological: Negative for adenopathy  Does not bruise/bleed easily  Past Medical History  Past Medical History:   Diagnosis Date    Arthritis     Atrial fibrillation (HCC)     Last Assessed:8/10/17    Benign prostatic hyperplasia     Last Assessed:14    BPH (benign prostatic hyperplasia)     Complete atrioventricular block (HCC)     Diverticulosis     Last Assessed:13    Hypercholesterolemia     Hypertension     Paroxysmal ventricular tachycardia (HCC)     Last Assessed:17    Prostatitis     Last Assessed:12    Pulmonary artery hypertension (HCC)     mild pulmonary htn    Retention, urine     Right bundle branch block     Last Assessed:13    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     Last [id number]:     Past Social History  Past Surgical History:   Procedure Laterality Date    CARDIAC PACEMAKER PLACEMENT      St  Judes DC PPM    COLONOSCOPY      HEMORRHOID SURGERY      Cauterization of hemorrhoids    HERNIA REPAIR      INCISION AND DRAINAGE ABSCESS / HEMATOMA OF BURSA / KNEE / THIGH      Fatty Tumor Removed    KNEE SURGERY Left     Steel Removes from left knee     Social History     Tobacco Use   Smoking Status Former Smoker    Last attempt to quit: [place] Years since quittin 8   Smokeless Tobacco Never Used     Past Family History  Family History   Problem Relation Age of Onset    Hypertension Mother     Stroke Mother         Stroke Syndrome    Other Sister         pacemaker placement    Prostate cancer Brother     Diabetes Family      Past Social history  Social History     Socioeconomic History    Marital status:       Spouse name: Not on file    Number of children: Not on file    Years of education: Not on file    Highest education level: Not on file   Occupational History    Occupation: retired   Social Needs    Financial resource strain: Not on file    Food insecurity:     Worry: Not on file     Inability: Not on file   TELOS needs: Medical: Not on file     Non-medical: Not on file   Tobacco Use    Smoking status: Former Smoker     Last attempt to quit:      Years since quittin 8    Smokeless tobacco: Never Used   Substance and Sexual Activity    Alcohol use: Not Currently     Alcohol/week: 0 0 standard drinks     Frequency: Monthly or less     Binge frequency: Never     Comment: social drinker    Drug use: No    Sexual activity: Not on file   Lifestyle    Physical activity:     Days per week: Not on file     Minutes per session: Not on file    Stress: Not on file   Relationships    Social connections:     Talks on phone: Not on file     Gets together: Not on file     Attends Jewish service: Not on file     Active member of club or organization: Not on file     Attends meetings of clubs or organizations: Not on file     Relationship status: Not on file    Intimate partner violence:     Fear of current or ex partner: Not on file     Emotionally abused: Not on file     Physically abused: Not on file     Forced sexual activity: Not on file   Other Topics Concern    Not on file   Social History Narrative    Advanced directive discussed with patient    Always uses seatbelt    Always uses sunscreen    Caffeine use    Regular dental care     Current Medications  Current Outpatient Medications   Medication Sig Dispense Refill    Ascorbic Acid (VITAMIN C) 1000 MG tablet Take 1 tablet by mouth daily      aspirin 325 mg tablet Take 1 tablet by mouth daily      Cholecalciferol (VITAMIN D3) 1000 units CAPS Take 1 tablet by mouth daily      ciprofloxacin (CIPRO) 500 mg tablet Take 1 tablet (500 mg total) by mouth every 12 (twelve) hours for 7 days 14 tablet 0    cyanocobalamin (VITAMIN B-12) 100 mcg tablet Take 1 tablet by mouth daily      metoprolol succinate (TOPROL-XL) 50 mg 24 hr tablet TAKE 1 5 TABLET DAILY 135 tablet 3    Multiple Vitamins-Minerals (ICAPS AREDS 2 PO) Take by mouth      Red Yeast Rice 600 MG TABS Take by mouth daily      tamsulosin (FLOMAX) 0 4 mg Take 1 capsule (0 4 mg total) by mouth daily for 90 days 90 capsule 3    clotrimazole-betamethasone (LOTRISONE) 1-0 05 % cream Apply topically 2 (two) times a day 30 g 0    finasteride (PROSCAR) 5 mg tablet Take 1 tablet (5 mg total) by mouth daily 90 tablet 1     No current facility-administered medications for this visit  Allergies  Allergies   Allergen Reactions    Ibuprofen Hives         The following portions of the patient's history were reviewed and updated as appropriate: allergies, current medications, past medical history, past social history, past surgical history and problem list       Vitals  Vitals:    10/22/19 0751   BP: 130/70   Pulse: 64   Resp: 20   Weight: 90 kg (198 lb 6 6 oz)   Height: 6' 3" (1 905 m)       Physical Exam   Constitutional: He is oriented to person, place, and time  He appears well-developed and well-nourished  No distress  HENT:   Head: Normocephalic and atraumatic  Pulmonary/Chest: Effort normal    Abdominal: Soft  Bowel sounds are normal  He exhibits no distension  There is no tenderness  No suprapubic or flank tenderness   Genitourinary:   Genitourinary Comments: Uncircumcised penis, easily retracted foreskin  Glans penis is beefy and red but nontender  Testicles smooth descended bilaterally  Musculoskeletal: He exhibits no edema  Neurological: He is alert and oriented to person, place, and time  Gait normal    Skin: Skin is warm and dry  He is not diaphoretic  Psychiatric: He has a normal mood and affect  His speech is normal and behavior is normal    Nursing note and vitals reviewed  Results  No results found for this or any previous visit (from the past 1 hour(s))  ]  No results found for: PSA  Lab Results   Component Value Date    CALCIUM 9 0 10/16/2019    K 4 7 10/16/2019    CO2 28 10/16/2019     10/16/2019    BUN 19 10/16/2019    CREATININE 1 15 10/16/2019     Lab Results   Component Value Date    WBC 11 95 (H) 10/16/2019    HGB 14 2 10/16/2019    HCT 43 1 10/16/2019    MCV 93 10/16/2019     10/16/2019         Orders  Orders Placed This Encounter   Procedures    POCT Measure PVR

## 2019-10-31 ENCOUNTER — OFFICE VISIT (OUTPATIENT)
Dept: INTERNAL MEDICINE CLINIC | Facility: CLINIC | Age: 84
End: 2019-10-31
Payer: MEDICARE

## 2019-10-31 VITALS
TEMPERATURE: 96.7 F | OXYGEN SATURATION: 97 % | WEIGHT: 194.38 LBS | HEART RATE: 76 BPM | BODY MASS INDEX: 24.17 KG/M2 | HEIGHT: 75 IN | SYSTOLIC BLOOD PRESSURE: 124 MMHG | DIASTOLIC BLOOD PRESSURE: 70 MMHG

## 2019-10-31 DIAGNOSIS — I48.21 PERMANENT ATRIAL FIBRILLATION (HCC): ICD-10-CM

## 2019-10-31 DIAGNOSIS — N40.1 BENIGN PROSTATIC HYPERPLASIA WITH LOWER URINARY TRACT SYMPTOMS, SYMPTOM DETAILS UNSPECIFIED: ICD-10-CM

## 2019-10-31 DIAGNOSIS — K57.90 DIVERTICULOSIS: ICD-10-CM

## 2019-10-31 DIAGNOSIS — K13.79 MOUTH PAIN: Primary | ICD-10-CM

## 2019-10-31 PROCEDURE — 99214 OFFICE O/P EST MOD 30 MIN: CPT | Performed by: INTERNAL MEDICINE

## 2019-10-31 RX ORDER — CLOTRIMAZOLE 10 MG/1
10 LOZENGE ORAL; TOPICAL 3 TIMES DAILY
Qty: 21 TABLET | Refills: 1 | Status: SHIPPED | OUTPATIENT
Start: 2019-10-31 | End: 2020-02-28 | Stop reason: ALTCHOICE

## 2019-10-31 NOTE — PROGRESS NOTES
Assessment/Plan:         Diagnoses and all orders for this visit:    Mouth pain  -     clotrimazole (MYCELEX) 10 mg isabell; Take 1 tablet (10 mg total) by mouth 3 (three) times a day  He will use this and soak partial daily for a few days without wearing If persists recommend revisit dentist    Benign prostatic hyperplasia with lower urinary tract symptoms, symptom details unspecified  Urology sxs are much improved on proscar so patient will continue and has followup appt    Permanent atrial fibrillation  Stable on present rx Has cardio followup next year    Diverticulosis  No recurrent sxs He has been drinking more water and increased fiber in diet      Rto 4 months     Patient ID: Noel Magallon is a 80 y o  male  HPI   Pt recently has urinary retention - had catheter for about a week He now is on Proscar and sxs much better He just saw Urology and has followup in [place] He recently had a filling and now has pain along lower gum line - he is questioning fungal infection He does wear a partial He has been using otc oral analgesic  No chest pain or sob His appetite is good No abdominal pain or change in bowels No falls No fever or chills Weight stable        Review of Systems   Constitutional: Positive for activity change  HENT: Positive for sore throat  Respiratory: Negative  Cardiovascular: Negative  Gastrointestinal: Negative  Genitourinary: Negative  Musculoskeletal: Negative  Skin:        Thrush lower gumline   Neurological: Negative  Hematological: Negative  Psychiatric/Behavioral: Negative          Past Medical History:   Diagnosis Date    Arthritis     Atrial fibrillation (HCC)     Last Assessed:8/10/17    Benign prostatic hyperplasia     Last Assessed:1/28/14    BPH (benign prostatic hyperplasia)     Complete atrioventricular block (HCC)     Diverticulosis     Last Assessed:4/30/13    Hypercholesterolemia     Hypertension     Paroxysmal ventricular tachycardia ([facility] [address] ) Last Assessed:17    Prostatitis     Last Assessed:12    Pulmonary artery hypertension (HCC)     mild pulmonary htn    Retention, urine     Right bundle branch block     Last Assessed:13    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     Last TEMO:     Past Surgical History:   Procedure Laterality Date    CARDIAC PACEMAKER PLACEMENT      St  Judes DC PPM    COLONOSCOPY      HEMORRHOID SURGERY      Cauterization of hemorrhoids    HERNIA REPAIR      INCISION AND DRAINAGE ABSCESS / HEMATOMA OF BURSA / KNEE / THIGH      Fatty Tumor Removed    KNEE SURGERY Left     Steel Removes from left knee     Social History     Socioeconomic History    Marital status:       Spouse name: Not on file    Number of children: Not on file    Years of education: Not on file    Highest education level: Not on file   Occupational History    Occupation: retired   Social Needs    Financial resource strain: Not on file    Food insecurity:     Worry: Not on file     Inability: Not on file   [employer] needs:     Medical: Not on file     Non-medical: Not on file   Tobacco Use    Smoking status: Former Smoker     Last attempt to quit: 3     Years since quittin 8    Smokeless tobacco: Never Used   Substance and Sexual Activity    Alcohol use: Not Currently     Alcohol/week: 0 0 standard drinks     Frequency: Monthly or less     Binge frequency: Never     Comment: social drinker    Drug use: No    Sexual activity: Not on file   Lifestyle    Physical activity:     Days per week: Not on file     Minutes per session: Not on file    Stress: Not on file   Relationships    Social connections:     Talks on phone: Not on file     Gets together: Not on file     Attends Voodoo service: Not on file     Active member of club or organization: Not on file     Attends meetings of clubs or organizations: Not on file     Relationship status: Not on file    Intimate partner violence:     Fear of current or ex partner: Not on file     Emotionally abused: Not on file     Physically abused: Not on file     Forced sexual activity: Not on file   Other Topics Concern    Not on file   Social History Narrative    Advanced directive discussed with patient    Always uses seatbelt    Always uses sunscreen    Caffeine use    Regular dental care     Allergies   Allergen Reactions    Ibuprofen Hives             /70   Pulse 76   Temp (!) 96 7 °F (35 9 °C) (Tympanic)   Ht 6' 3" (1 905 m)   Wt 88 2 kg (194 lb 6 oz)   SpO2 97%   BMI 24 30 kg/m²          Physical Exam   Constitutional: He is oriented to person, place, and time  He appears well-developed and well-nourished  No distress  HENT:   Head: Normocephalic and atraumatic  Mouth/Throat: Oropharynx is clear and moist  No oropharyngeal exudate  Oral thrush lower gumline   Eyes: Pupils are equal, round, and reactive to light  Conjunctivae and EOM are normal  No scleral icterus  Neck: Normal range of motion  Cardiovascular: Normal rate and regular rhythm  Pulmonary/Chest: Effort normal and breath sounds normal  No respiratory distress  He has no wheezes  Abdominal: Soft  Bowel sounds are normal    Musculoskeletal: Normal range of motion  Neurological: He is alert and oriented to person, place, and time  He displays abnormal reflex  No cranial nerve deficit  He exhibits abnormal muscle tone  Coordination abnormal    Skin: Skin is warm and dry  Capillary refill takes less than 2 seconds  He is not diaphoretic  Psychiatric: He has a normal mood and affect  His behavior is normal  Judgment and thought content normal    Nursing note and vitals reviewed

## 2019-11-12 ENCOUNTER — CLINICAL SUPPORT (OUTPATIENT)
Dept: UROLOGY | Facility: CLINIC | Age: 84
End: 2019-11-12
Payer: MEDICARE

## 2019-11-12 VITALS
HEIGHT: 75 IN | BODY MASS INDEX: 24.12 KG/M2 | DIASTOLIC BLOOD PRESSURE: 62 MMHG | HEART RATE: 60 BPM | SYSTOLIC BLOOD PRESSURE: 124 MMHG | WEIGHT: 194 LBS

## 2019-11-12 DIAGNOSIS — R33.9 URINARY RETENTION: Primary | ICD-10-CM

## 2019-11-12 LAB — POST-VOID RESIDUAL VOLUME, ML POC: 56 ML

## 2019-11-12 PROCEDURE — 99211 OFF/OP EST MAY X REQ PHY/QHP: CPT

## 2019-11-12 PROCEDURE — 51798 US URINE CAPACITY MEASURE: CPT

## 2019-11-12 NOTE — PROGRESS NOTES
11/12/2019    Whit Griggs  5/14/1928  [id number]    Diagnosis  Chief Complaint     Urinary Retention          Patient presents for PVR managed by Dr Renzo Rinaldi in the [place] office  Plan  Follow up in  3 months as scheduled for recheck with PVR  Procedure PVR    Patient returned this afternoon  Patient states able to void  Patient voided 300 ml while in office  Bladder ultrasound performed and PVR measured 56ml        Vitals:    11/12/19 0826   BP: 124/62   Pulse: 60   Weight: 88 kg (194 lb)   Height: 6' 3" (1 905 m)           Tatianna Peraza

## 2019-11-26 ENCOUNTER — REMOTE DEVICE CLINIC VISIT (OUTPATIENT)
Dept: CARDIOLOGY CLINIC | Facility: CLINIC | Age: 84
End: 2019-11-26
Payer: MEDICARE

## 2019-11-26 DIAGNOSIS — Z95.0 CARDIAC PACEMAKER IN SITU: Primary | ICD-10-CM

## 2019-11-26 PROCEDURE — 93296 REM INTERROG EVL PM/IDS: CPT | Performed by: INTERNAL MEDICINE

## 2019-11-26 PROCEDURE — 93294 REM INTERROG EVL PM/LDLS PM: CPT | Performed by: INTERNAL MEDICINE

## 2019-11-26 NOTE — PROGRESS NOTES
Results for orders placed or performed in visit on 11/26/19   Cardiac EP device report    Narrative    SJM-DUAL CHAMBER PPM (VVIR 60)  MERLIN TRANSMISSION: BATTERY VOLTAGE ADEQUATE (6 8-7 2 YRS)  -96% (>40% [email or web address])  ALL AVAILABLE LEAD PARAMETERS WITHIN NORMAL LIMITS  NO NEW SIGNIFICANT HIGH RATE EPISODES  NORMAL DEVICE FUNCTION   GV

## 2019-12-12 ENCOUNTER — HOSPITAL ENCOUNTER (EMERGENCY)
Facility: HOSPITAL | Age: 84
Discharge: HOME | End: 2019-12-12
Attending: EMERGENCY MEDICINE | Admitting: EMERGENCY MEDICINE
Payer: MEDICARE

## 2019-12-12 VITALS
SYSTOLIC BLOOD PRESSURE: 161 MMHG | RESPIRATION RATE: 18 BRPM | TEMPERATURE: 97.6 F | WEIGHT: 202 LBS | DIASTOLIC BLOOD PRESSURE: 69 MMHG | HEART RATE: 57 BPM | HEIGHT: 74 IN | BODY MASS INDEX: 25.93 KG/M2 | OXYGEN SATURATION: 94 %

## 2019-12-12 DIAGNOSIS — K05.10 GINGIVITIS: Primary | ICD-10-CM

## 2019-12-12 PROCEDURE — 99284 EMERGENCY DEPT VISIT MOD MDM: CPT | Performed by: EMERGENCY MEDICINE

## 2019-12-12 PROCEDURE — 99282 EMERGENCY DEPT VISIT SF MDM: CPT

## 2019-12-12 RX ORDER — AMOXICILLIN AND CLAVULANATE POTASSIUM 875; 125 MG/1; MG/1
1 TABLET, FILM COATED ORAL EVERY 12 HOURS
Qty: 14 TABLET | Refills: 0 | Status: SHIPPED | OUTPATIENT
Start: 2019-12-12 | End: 2019-12-19

## 2019-12-12 RX ORDER — AMOXICILLIN AND CLAVULANATE POTASSIUM 875; 125 MG/1; MG/1
1 TABLET, FILM COATED ORAL ONCE
Status: COMPLETED | OUTPATIENT
Start: 2019-12-12 | End: 2019-12-12

## 2019-12-12 RX ADMIN — AMOXICILLIN AND CLAVULANATE POTASSIUM 1 TABLET: 875; 125 TABLET, FILM COATED ORAL at 15:08

## 2019-12-12 NOTE — ED PROVIDER NOTES
History  Chief Complaint   Patient presents with    Oral Pain     pt has had sore mouth around lips, no open sores visible at this time  pt has been using peroxidal to attempt to help      42-year-old male presents complaining of mouth burning which he has seen both his PCP for and was treated for thrush as well as ENT who we saw on 12/03/2019  Patient has no open sores this time  He does have minimal gingival irritation  He states that it does not hurt him presently  He states that hurts when he eats spicy when he dips things like ketchup       History provided by:  Patient  Oral Pain   Severity:  Mild  Onset quality:  Gradual  Duration:  1 month  Timing:  Constant  Progression:  Worsening  Associated symptoms: no abdominal pain, no chest pain, no congestion, no cough, no diarrhea and no ear pain        Prior to Admission Medications   Prescriptions Last Dose Informant Patient Reported? Taking?    Ascorbic Acid (VITAMIN C) 1000 MG tablet 12/12/2019 at Unknown time Self Yes Yes   Sig: Take 1 tablet by mouth daily   Cholecalciferol (VITAMIN D3) 1000 units CAPS  Self Yes No   Sig: Take 1 tablet by mouth daily   Multiple Vitamins-Minerals (ICAPS AREDS 2 PO) 12/12/2019 at Unknown time Self Yes Yes   Sig: Take by mouth   Red Yeast Rice 600 MG TABS  Self Yes No   Sig: Take by mouth daily   aspirin 325 mg tablet 12/12/2019 at Unknown time Self Yes Yes   Sig: Take 1 tablet by mouth daily   clotrimazole (MYCELEX) 10 mg isabell  Self No No   Sig: Take 1 tablet (10 mg total) by mouth 3 (three) times a day   clotrimazole-betamethasone (LOTRISONE) 1-0 05 % cream  Self No No   Sig: Apply topically 2 (two) times a day   cyanocobalamin (VITAMIN B-12) 100 mcg tablet  Self Yes No   Sig: Take 1 tablet by mouth daily   finasteride (PROSCAR) 5 mg tablet  Self No No   Sig: Take 1 tablet (5 mg total) by mouth daily   metoprolol succinate (TOPROL-XL) 50 mg 24 hr tablet 12/12/2019 at Unknown time Self No Yes   Sig: TAKE 1 5 TABLET DAILY tamsulosin (FLOMAX) 0 4 mg  Self No No   Sig: Take 1 capsule (0 4 mg total) by mouth daily for 90 days      Facility-Administered Medications: None       Past Medical History:   Diagnosis Date    Arthritis     Atrial fibrillation (HCC)     Last Assessed:8/10/17    Benign prostatic hyperplasia     Last Assessed:14    BPH (benign prostatic hyperplasia)     Complete atrioventricular block (HCC)     Diverticulosis     Last Assessed:13    Hypercholesterolemia     Hypertension     Paroxysmal ventricular tachycardia (HCC)     Last Assessed:17    Prostatitis     Last Assessed:12    Pulmonary artery hypertension (HCC)     mild pulmonary htn    Retention, urine     Right bundle branch block     Last Assessed:13    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     Last EQOOEYGRX:       Past Surgical History:   Procedure Laterality Date    CARDIAC PACEMAKER PLACEMENT      St  J[facility] DC PPM    COLONOSCOPY      HEMORRHOID SURGERY      Cauterization of hemorrhoids    HERNIA REPAIR      INCISION AND DRAINAGE ABSCESS / HEMATOMA OF BURSA / KNEE / THIGH      Fatty Tumor Removed    KNEE SURGERY Left     Steel Removes from left knee       Family History   Problem Relation Age of Onset    Hypertension Mother     Stroke Mother         Stroke Syndrome    Other Sister         pacemaker placement    Prostate cancer Brother     Diabetes Family      I have reviewed and agree with the history as documented  Social History     Tobacco Use    Smoking status: Former Smoker     Last attempt to quit:      Years since quittin 9    Smokeless tobacco: Never Used   Substance Use Topics    Alcohol use: Not Currently     Alcohol/week: 0 0 standard drinks     Frequency: Monthly or less     Binge frequency: Never     Comment: social drinker    Drug use: No        Review of Systems   HENT: Negative for congestion and ear pain  Eyes: Negative  Respiratory: Negative for cough  Cardiovascular: Negative for chest pain  Gastrointestinal: Negative for abdominal pain and diarrhea  Endocrine: Negative  Genitourinary: Negative  Musculoskeletal: Negative for arthralgias and back pain  Skin: Negative  Allergic/Immunologic: Negative  Neurological: Negative  Hematological: Negative  Psychiatric/Behavioral: Negative  All other systems reviewed and are negative  Physical Exam  Physical Exam   Constitutional: He appears well-developed and well-nourished  HENT:   Head: Normocephalic  Right Ear: External ear normal    Left Ear: External ear normal    No intraoral or lesions  Minimal gingival hyperplasia to the lower and upper anterior jaw  Eyes: Pupils are equal, round, and reactive to light  Neck: Normal range of motion  Cardiovascular: Normal rate  Pulmonary/Chest: Effort normal    Abdominal: Soft  Musculoskeletal: He exhibits no edema  Neurological: He is alert  He displays normal reflexes  No cranial nerve deficit  Coordination normal    Skin: Skin is warm  Capillary refill takes less than 2 seconds  No erythema  Psychiatric: He has a normal mood and affect  His behavior is normal  Thought content normal    Vitals reviewed        Vital Signs  ED Triage Vitals [12/12/19 1454]   Temperature Pulse Respirations Blood Pressure SpO2   97 6 °F (36 4 °C) 57 18 161/69 94 %      Temp Source Heart Rate Source Patient Position - Orthostatic VS BP Location FiO2 (%)   Temporal Monitor Sitting Left arm --      Pain Score       5           Vitals:    12/12/19 1454   BP: 161/69   Pulse: 57   Patient Position - Orthostatic VS: Sitting         Visual Acuity      ED Medications  Medications   amoxicillin-clavulanate (AUGMENTIN) 875-125 mg per tablet 1 tablet (1 tablet Oral Given 12/12/19 1508)       Diagnostic Studies  Results Reviewed     None                 No orders to display              Procedures  Procedures         ED Course MDM      Disposition  Final diagnoses:   Gingivitis     Time reflects when diagnosis was documented in both MDM as applicable and the Disposition within this note     Time User Action Codes Description Comment    12/12/2019  3:03 PM Veena Moon Add [K05 10] Gingivitis       ED Disposition     ED Disposition Condition Date/Time Comment    Discharge Stable Thu Dec 12, 2019  3:03 PM David Outlaw discharge to home/self care  Follow-up Information     Follow up With Specialties Details Why Contact Info    Cameron Edmondson DO Internal Medicine In 1 day  [facility]   [phone]            Patient's Medications   Discharge Prescriptions    AL MAG OXIDE-DIPHENHYDRAMINE-LIDOCAINE VISCOUS (MAGIC MOUTHWASH) 1:1:1 SUSPENSION    Swish and spit 10 mL every 4 (four) hours as needed for mouth pain or discomfort       Start Date: 12/12/2019End Date: --       Order Dose: 10 mL       Quantity: 90 mL    Refills: 0    AMOXICILLIN-CLAVULANATE (AUGMENTIN) 875-125 MG PER TABLET    Take 1 tablet by mouth every 12 (twelve) hours for 7 days       Start Date: 12/12/2019End Date: 12/19/2019       Order Dose: 1 tablet       Quantity: 14 tablet    Refills: 0     No discharge procedures on file      ED Provider  Electronically Signed by           Iris Neal DO  12/12/19 8488

## 2019-12-19 ENCOUNTER — TELEPHONE (OUTPATIENT)
Dept: INTERNAL MEDICINE CLINIC | Facility: CLINIC | Age: 84
End: 2019-12-19

## 2019-12-20 ENCOUNTER — OFFICE VISIT (OUTPATIENT)
Dept: INTERNAL MEDICINE CLINIC | Facility: CLINIC | Age: 84
End: 2019-12-20
Payer: MEDICARE

## 2019-12-20 ENCOUNTER — TELEPHONE (OUTPATIENT)
Dept: CARDIOLOGY CLINIC | Facility: HOSPITAL | Age: 84
End: 2019-12-20

## 2019-12-20 VITALS
OXYGEN SATURATION: 94 % | BODY MASS INDEX: 25.72 KG/M2 | WEIGHT: 200.38 LBS | HEIGHT: 74 IN | HEART RATE: 64 BPM | TEMPERATURE: 97.3 F

## 2019-12-20 DIAGNOSIS — K12.0 APHTHOUS ULCER OF MOUTH: Primary | ICD-10-CM

## 2019-12-20 PROCEDURE — 99213 OFFICE O/P EST LOW 20 MIN: CPT | Performed by: INTERNAL MEDICINE

## 2019-12-20 NOTE — PROGRESS NOTES
Assessment/Plan:      Diagnoses and all orders for this visit:    Aphthous ulcer of mouth  Use antiseptic rinse Bid  Set up dental appt ? Need extraction of bottom tooth              Patient ID: Karolina Amanda is a 80 y o  male  HPI   Pt seen in ER for ulcer of the lower gum Sxs are better after taking antibx (Augmentin) which he finished yesterday He does have a partial on the bottom gum level The ulceration is almost completely resolved  Pt does se Dr Wilson as his dentist but has not been seen since sxs developed No fever Eating normally nad he feels his partial fits normally    Review of Systems   Constitutional: Negative for chills and fever  HENT: Positive for mouth sores  Respiratory: Negative  Cardiovascular: Negative  Musculoskeletal: Positive for arthralgias  Skin: Negative  Neurological: Negative  Negative for dizziness and headaches  Hematological: Negative  Negative for adenopathy  Psychiatric/Behavioral: Negative          Past Medical History:   Diagnosis Date    Arthritis     Atrial fibrillation (HCC)     Last Assessed:8/10/17    Benign prostatic hyperplasia     Last Assessed:1/28/14    BPH (benign prostatic hyperplasia)     Complete atrioventricular block (HCC)     Diverticulosis     Last Assessed:4/30/13    Hypercholesterolemia     Hypertension     Paroxysmal ventricular tachycardia (HCC)     Last Assessed:7/20/17    Prostatitis     Last Assessed:12/19/12    Pulmonary artery hypertension (HCC)     mild pulmonary htn    Retention, urine     Right bundle branch block     Last Assessed:11/26/13    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     Last WHLCBTDWF:18/1/75     Past Surgical History:   Procedure Laterality Date    CARDIAC PACEMAKER PLACEMENT      St  Judes DC PPM    COLONOSCOPY      HEMORRHOID SURGERY      Cauterization of hemorrhoids    HERNIA REPAIR      INCISION AND DRAINAGE ABSCESS / HEMATOMA OF BURSA / KNEE / THIGH      Fatty Tumor Removed    KNEE SURGERY Left     Steel Removes from left knee     Social History     Socioeconomic History    Marital status:      Spouse name: Not on file    Number of children: Not on file    Years of education: Not on file    Highest education level: Not on file   Occupational History    Occupation: retired   Social Needs    Financial resource strain: Not on file    Food insecurity:     Worry: Not on file     Inability: Not on file   ROCKI needs:     Medical: Not on file     Non-medical: Not on file   Tobacco Use    Smoking status: Former Smoker     Last attempt to quit:      Years since quittin 0    Smokeless tobacco: Never Used   Substance and Sexual Activity    Alcohol use: Not Currently     Alcohol/week: 0 0 standard drinks     Frequency: Monthly or less     Binge frequency: Never     Comment: social drinker    Drug use: No    Sexual activity: Not on file   Lifestyle    Physical activity:     Days per week: Not on file     Minutes per session: Not on file    Stress: Not on file   Relationships    Social connections:     Talks on phone: Not on file     Gets together: Not on file     Attends Christianity service: Not on file     Active member of club or organization: Not on file     Attends meetings of clubs or organizations: Not on file     Relationship status: Not on file    Intimate partner violence:     Fear of current or ex partner: Not on file     Emotionally abused: Not on file     Physically abused: Not on file     Forced sexual activity: Not on file   Other Topics Concern    Not on file   Social History Narrative    Advanced directive discussed with patient    Always uses seatbelt    Always uses sunscreen    Caffeine use- 1 soda on occasion    Regular dental care     Allergies   Allergen Reactions    Ibuprofen Hives     BMI Counseling: Body mass index is 25 73 kg/m²  The BMI is above normal  Nutrition recommendations include moderation in carbohydrate intake   No pharmacotherapy was ordered  Physical Exam   Constitutional: He is oriented to person, place, and time  He appears well-developed and well-nourished  No distress  HENT:   Head: Normocephalic and atraumatic  Mouth/Throat: Oropharynx is clear and moist  No oropharyngeal exudate  Eyes: Pupils are equal, round, and reactive to light  Conjunctivae and EOM are normal  No scleral icterus  Neck: Normal range of motion  Neck supple  No JVD present  Cardiovascular: Normal rate and regular rhythm  Pulmonary/Chest: Effort normal and breath sounds normal  No respiratory distress  He has no wheezes  Abdominal: Soft  Bowel sounds are normal    Lymphadenopathy:     He has no cervical adenopathy  Neurological: He is alert and oriented to person, place, and time  No cranial nerve deficit  He exhibits abnormal muscle tone  Skin: Skin is warm and dry  He is not diaphoretic  Psychiatric: He has a normal mood and affect  His behavior is normal  Judgment and thought content normal    Nursing note and vitals reviewed    very small aphthous ulcer mid lower gum line Gingival disease and poor dentition of 2 teeth adjacent to partial apparatus

## 2019-12-20 NOTE — TELEPHONE ENCOUNTER
Attempted to contact Sunil Hanks multiple times to schedule his appointment with cardiology  A letter has been sent to have him contact our office

## 2020-01-15 DIAGNOSIS — N40.0 BENIGN PROSTATIC HYPERPLASIA, UNSPECIFIED WHETHER LOWER URINARY TRACT SYMPTOMS PRESENT: ICD-10-CM

## 2020-01-15 RX ORDER — TAMSULOSIN HYDROCHLORIDE 0.4 MG/1
0.4 CAPSULE ORAL DAILY
Qty: 90 CAPSULE | Refills: 0 | Status: SHIPPED | OUTPATIENT
Start: 2020-01-15 | End: 2020-04-17 | Stop reason: SDUPTHER

## 2020-01-27 ENCOUNTER — APPOINTMENT (EMERGENCY)
Dept: RADIOLOGY | Facility: HOSPITAL | Age: 85
End: 2020-01-27
Payer: MEDICARE

## 2020-01-27 ENCOUNTER — HOSPITAL ENCOUNTER (EMERGENCY)
Facility: HOSPITAL | Age: 85
Discharge: HOME | End: 2020-01-27
Attending: EMERGENCY MEDICINE | Admitting: EMERGENCY MEDICINE
Payer: MEDICARE

## 2020-01-27 VITALS
BODY MASS INDEX: 26.21 KG/M2 | RESPIRATION RATE: 19 BRPM | DIASTOLIC BLOOD PRESSURE: 60 MMHG | SYSTOLIC BLOOD PRESSURE: 134 MMHG | WEIGHT: 204.15 LBS | HEART RATE: 60 BPM | OXYGEN SATURATION: 94 % | TEMPERATURE: 97.5 F

## 2020-01-27 DIAGNOSIS — J06.9 VIRAL URI WITH COUGH: Primary | ICD-10-CM

## 2020-01-27 PROCEDURE — 99283 EMERGENCY DEPT VISIT LOW MDM: CPT

## 2020-01-27 PROCEDURE — 71046 X-RAY EXAM CHEST 2 VIEWS: CPT

## 2020-01-27 PROCEDURE — 99284 EMERGENCY DEPT VISIT MOD MDM: CPT | Performed by: EMERGENCY MEDICINE

## 2020-01-27 RX ORDER — AZITHROMYCIN 250 MG/1
TABLET, FILM COATED ORAL
Qty: 6 TABLET | Refills: 0 | Status: SHIPPED | OUTPATIENT
Start: 2020-01-27 | End: 2020-01-31

## 2020-01-27 NOTE — ED PROVIDER NOTES
History  Chief Complaint   Patient presents with    URI     cough, congestion, sore chest x 3 days, non productive cough, using otc preps with moderate relief     HPI     Pt presents from home, hx of afib, HTN, HLD, pacemaker, prior smoker, presents with intermittent productive cough, runny nose and "hurts to cough "  Pt states his symptoms started 3 days ago  Pt denies ha, fevers, sob, n/v/d/c, abd pain, dysuria, focal def or syncope  Prior to Admission Medications   Prescriptions Last Dose Informant Patient Reported? Taking?    Ascorbic Acid (VITAMIN C) 1000 MG tablet  Self Yes No   Sig: Take 1 tablet by mouth daily   Cholecalciferol (VITAMIN D3) 1000 units CAPS  Self Yes No   Sig: Take 1 tablet by mouth daily   Multiple Vitamins-Minerals (ICAPS AREDS 2 PO)  Self Yes No   Sig: Take by mouth   Red Yeast Rice 600 MG TABS  Self Yes No   Sig: Take by mouth daily   al mag oxide-diphenhydramine-lidocaine viscous (MAGIC MOUTHWASH) 1:1:1 suspension   No No   Sig: Swish and spit 10 mL every 4 (four) hours as needed for mouth pain or discomfort   aspirin 325 mg tablet  Self Yes No   Sig: Take 1 tablet by mouth daily   clotrimazole (MYCELEX) 10 mg isabell  Self No No   Sig: Take 1 tablet (10 mg total) by mouth 3 (three) times a day   clotrimazole-betamethasone (LOTRISONE) 1-0 05 % cream  Self No No   Sig: Apply topically 2 (two) times a day   cyanocobalamin (VITAMIN B-12) 100 mcg tablet  Self Yes No   Sig: Take 1 tablet by mouth daily   finasteride (PROSCAR) 5 mg tablet  Self No No   Sig: Take 1 tablet (5 mg total) by mouth daily   metoprolol succinate (TOPROL-XL) 50 mg 24 hr tablet  Self No No   Sig: TAKE 1 5 TABLET DAILY   tamsulosin (FLOMAX) 0 4 mg   No No   Sig: TAKE 1 CAPSULE (0 4 MG TOTAL) BY MOUTH DAILY FOR 90 DAYS      Facility-Administered Medications: None       Past Medical History:   Diagnosis Date    Arthritis     Atrial fibrillation (HCC)     Last Assessed:8/10/17    Benign prostatic hyperplasia Last Assessed:14    BPH (benign prostatic hyperplasia)     Complete atrioventricular block (HCC)     Diverticulosis     Last Assessed:13    Hypercholesterolemia     Hypertension     Paroxysmal ventricular tachycardia (HCC)     Last Assessed:17    Prostatitis     Last Assessed:12    Pulmonary artery hypertension (HCC)     mild pulmonary htn    Retention, urine     Right bundle branch block     Last Assessed:13    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     Last [id number]:       Past Surgical History:   Procedure Laterality Date    CARDIAC PACEMAKER PLACEMENT      St  Judes DC PPM    COLONOSCOPY      HEMORRHOID SURGERY      Cauterization of hemorrhoids    HERNIA REPAIR      INCISION AND DRAINAGE ABSCESS / HEMATOMA OF BURSA / KNEE / THIGH      Fatty Tumor Removed    KNEE SURGERY Left     Steel Removes from left knee       Family History   Problem Relation Age of Onset    Hypertension Mother     Stroke Mother         Stroke Syndrome    Other Sister         pacemaker placement    Prostate cancer Brother     Diabetes Family      I have reviewed and agree with the history as documented  Social History     Tobacco Use    Smoking status: Former Smoker     Last attempt to quit:      Years since quittin 1    Smokeless tobacco: Never Used   Substance Use Topics    Alcohol use: Not Currently     Alcohol/week: 0 0 standard drinks     Frequency: Monthly or less     Binge frequency: Never     Comment: social drinker    Drug use: No        Review of Systems   Constitutional: Negative for activity change, appetite change and fever  HENT: Positive for congestion, postnasal drip and rhinorrhea  Negative for nosebleeds and sore throat  Eyes: Negative for photophobia and discharge  Respiratory: Positive for cough  Negative for shortness of breath, wheezing and stridor  Cardiovascular: Negative for chest pain     Gastrointestinal: Negative for abdominal pain, constipation, diarrhea, nausea and vomiting  Endocrine: Negative for cold intolerance and polydipsia  Genitourinary: Negative for discharge, hematuria and penile pain  Musculoskeletal: Negative for arthralgias, myalgias and neck stiffness  Skin: Negative for color change and rash  Allergic/Immunologic: Negative for immunocompromised state  Neurological: Negative for dizziness, seizures and headaches  Hematological: Negative for adenopathy  Psychiatric/Behavioral: Negative for confusion and self-injury  The patient is not nervous/anxious  Physical Exam  Physical Exam   Constitutional: He is oriented to person, place, and time  He appears well-developed and well-nourished  No distress  HENT:   Head: Normocephalic and atraumatic  Right Ear: External ear normal    Left Ear: External ear normal    Mouth/Throat: No oropharyngeal exudate  Nasal congestion and post-nasal drip  No posterior oropharynx erythema, stridor, asymmetry or trismus  Eyes: Pupils are equal, round, and reactive to light  Conjunctivae and EOM are normal  Right eye exhibits no discharge  Left eye exhibits no discharge  No scleral icterus  Neck: Normal range of motion  Neck supple  No JVD present  No tracheal deviation present  No thyromegaly present  Cardiovascular: Normal rate, regular rhythm, normal heart sounds and intact distal pulses  Exam reveals no gallop and no friction rub  No murmur heard  Pulmonary/Chest: Breath sounds normal  No stridor  No respiratory distress  He has no wheezes  He has no rales  He exhibits no tenderness  Abdominal: Soft  Bowel sounds are normal  He exhibits no distension and no mass  There is no tenderness  There is no rebound and no guarding  Musculoskeletal: Normal range of motion  He exhibits no edema, tenderness or deformity  Lymphadenopathy:     He has no cervical adenopathy  Neurological: He is alert and oriented to person, place, and time  He has normal reflexes   He displays normal reflexes  No cranial nerve deficit  He exhibits normal muscle tone  Coordination normal    Skin: Skin is warm and dry  No rash noted  He is not diaphoretic  No erythema  No pallor  Psychiatric: He has a normal mood and affect  His behavior is normal  Judgment and thought content normal    Nursing note and vitals reviewed  Vital Signs  ED Triage Vitals [01/27/20 0621]   Temperature Pulse Respirations Blood Pressure SpO2   97 5 °F (36 4 °C) 70 18 146/67 95 %      Temp Source Heart Rate Source Patient Position - Orthostatic VS BP Location FiO2 (%)   Temporal Monitor Lying Left arm --      Pain Score       5           Vitals:    01/27/20 0621 01/27/20 0700   BP: 146/67 134/60   Pulse: 70 60   Patient Position - Orthostatic VS: Lying          Visual Acuity      ED Medications  Medications - No data to display    Diagnostic Studies  Results Reviewed     None                 XR chest 2 views   Final Result by Rylee Norton MD (01/27 0423)      Wispy opacities in the right upper lobe seen only on frontal view could represent focal sequelae of bronchitis, mild atelectasis, or (less likely) developing pneumonia  Workstation performed: [id number]                    Procedures  Procedures         ED Course                               MDM  Number of Diagnoses or Management Options  Viral URI with cough:   Diagnosis management comments: IMP: viral uri versus bronchitis, pneumonia  Doubt acs, pe, dissection, tamponade, cva, bacteremia, surgical abd process  Plan: cxr, nasal saline spray, give po antibiotic  - cxr no acute  - Pt with likely bronchitis  Will give po abx  Pt will f/up w/ his pcp         Amount and/or Complexity of Data Reviewed  Tests in the radiology section of CPT®: ordered and reviewed  Tests in the medicine section of CPT®: ordered and reviewed  Decide to obtain previous medical records or to obtain history from someone other than the patient: yes  Review and summarize past medical records: yes  Independent visualization of images, tracings, or specimens: yes    Risk of Complications, Morbidity, and/or Mortality  Presenting problems: high  Diagnostic procedures: low  Management options: low    Patient Progress  Patient progress: improved        Disposition  Final diagnoses:   Viral URI with cough     Time reflects when diagnosis was documented in both MDM as applicable and the Disposition within this note     Time User Action Codes Description Comment    1/27/2020  7:11 AM Mittie Stage Add [J06 9,  B97 89] Viral URI with cough       ED Disposition     ED Disposition Condition Date/Time Comment    Discharge Stable Mon Jan 27, 2020  7:11 AM Tye Rocha discharge to home/self care              Follow-up Information     Follow up With Specialties Details Why [facility], DO Internal Medicine Schedule an appointment as soon as possible for a visit in 1 day Return immediately, If symptoms worsen [address]  [address]  [phone]            Discharge Medication List as of 1/27/2020  7:14 AM      START taking these medications    Details   azithromycin (ZITHROMAX) 250 mg tablet Take 2 tablets today then 1 tablet daily x 4 days, Normal      sodium chloride (OCEAN) 0 65 % nasal spray 1 spray into each nostril as needed for rhinitis, Starting Mon 1/27/2020, Until Wed 2/26/2020, Normal         CONTINUE these medications which have NOT CHANGED    Details   al mag oxide-diphenhydramine-lidocaine viscous (MAGIC MOUTHWASH) 1:1:1 suspension Swish and spit 10 mL every 4 (four) hours as needed for mouth pain or discomfort, Starting u 12/12/2019, Print      Ascorbic Acid (VITAMIN C) 1000 MG tablet Take 1 tablet by mouth daily, Historical Med      aspirin 325 mg tablet Take 1 tablet by mouth daily, Historical Med      Cholecalciferol (VITAMIN D3) 1000 units CAPS Take 1 tablet by mouth daily, Historical Med      clotrimazole (MYCELEX) 10 mg isabell Take 1 tablet (10 mg total) by mouth 3 (three) times a day, Starting Thu 10/31/2019, Normal      clotrimazole-betamethasone (LOTRISONE) 1-0 05 % cream Apply topically 2 (two) times a day, Starting Tue 10/22/2019, Normal      cyanocobalamin (VITAMIN B-12) 100 mcg tablet Take 1 tablet by mouth daily, Historical Med      finasteride (PROSCAR) 5 mg tablet Take 1 tablet (5 mg total) by mouth daily, Starting Tue 10/22/2019, Normal      metoprolol succinate (TOPROL-XL) 50 mg 24 hr tablet TAKE 1 5 TABLET DAILY, Normal      Multiple Vitamins-Minerals (ICAPS AREDS 2 PO) Take by mouth, Historical Med      Red Yeast Rice 600 MG TABS Take by mouth daily, Historical Med      tamsulosin (FLOMAX) 0 4 mg TAKE 1 CAPSULE (0 4 MG TOTAL) BY MOUTH DAILY FOR 90 DAYS, Starting Wed 1/15/2020, Until Tue 4/14/2020, Normal           No discharge procedures on file      ED Provider  Electronically Signed by           Ag Mott DO  01/29/20 8938

## 2020-02-22 ENCOUNTER — TELEPHONE (OUTPATIENT)
Dept: OTHER | Facility: OTHER | Age: 85
End: 2020-02-22

## 2020-02-27 ENCOUNTER — REMOTE DEVICE CLINIC VISIT (OUTPATIENT)
Dept: CARDIOLOGY CLINIC | Facility: CLINIC | Age: 85
End: 2020-02-27
Payer: MEDICARE

## 2020-02-27 DIAGNOSIS — Z95.0 PACEMAKER: Primary | ICD-10-CM

## 2020-02-27 PROCEDURE — 93294 REM INTERROG EVL PM/LDLS PM: CPT | Performed by: INTERNAL MEDICINE

## 2020-02-27 PROCEDURE — 93296 REM INTERROG EVL PM/IDS: CPT | Performed by: INTERNAL MEDICINE

## 2020-02-27 NOTE — PROGRESS NOTES
Results for orders placed or performed in visit on 02/27/20   Cardiac EP device report    Narrative    SJM-DUAL CHAMBER PPM (VVIR 60)  MERLIN TRANSMISSION: BATTERY ADEQUATE (6 7 YRS)   96% (CHB/VVIR 60)  ALL LEAD PARAMETERS WITHIN NORMAL LIMITS  NO EPISODES  NORMAL DEVICE FUNCTION   PL

## 2020-02-28 ENCOUNTER — OFFICE VISIT (OUTPATIENT)
Dept: INTERNAL MEDICINE CLINIC | Facility: CLINIC | Age: 85
End: 2020-02-28
Payer: MEDICARE

## 2020-02-28 VITALS
WEIGHT: 202.13 LBS | SYSTOLIC BLOOD PRESSURE: 132 MMHG | DIASTOLIC BLOOD PRESSURE: 74 MMHG | TEMPERATURE: 96.7 F | HEART RATE: 69 BPM | HEIGHT: 74 IN | OXYGEN SATURATION: 99 % | BODY MASS INDEX: 25.94 KG/M2

## 2020-02-28 DIAGNOSIS — E55.9 VITAMIN D DEFICIENCY: ICD-10-CM

## 2020-02-28 DIAGNOSIS — E78.00 HYPERCHOLESTEROLEMIA: ICD-10-CM

## 2020-02-28 DIAGNOSIS — I47.2 PAROXYSMAL VENTRICULAR TACHYCARDIA (HCC): Primary | ICD-10-CM

## 2020-02-28 DIAGNOSIS — K57.90 DIVERTICULOSIS: ICD-10-CM

## 2020-02-28 PROBLEM — K13.79 MOUTH PAIN: Status: RESOLVED | Noted: 2019-10-31 | Resolved: 2020-02-28

## 2020-02-28 PROBLEM — I47.29 NSVT (NONSUSTAINED VENTRICULAR TACHYCARDIA): Status: RESOLVED | Noted: 2019-06-12 | Resolved: 2020-02-28

## 2020-02-28 PROBLEM — K12.0 APHTHOUS ULCER OF MOUTH: Status: RESOLVED | Noted: 2019-12-20 | Resolved: 2020-02-28

## 2020-02-28 PROCEDURE — 1036F TOBACCO NON-USER: CPT | Performed by: INTERNAL MEDICINE

## 2020-02-28 PROCEDURE — 3078F DIAST BP <80 MM HG: CPT | Performed by: INTERNAL MEDICINE

## 2020-02-28 PROCEDURE — 4040F PNEUMOC VAC/ADMIN/RCVD: CPT | Performed by: INTERNAL MEDICINE

## 2020-02-28 PROCEDURE — 1160F RVW MEDS BY RX/DR IN RCRD: CPT | Performed by: INTERNAL MEDICINE

## 2020-02-28 PROCEDURE — 99214 OFFICE O/P EST MOD 30 MIN: CPT | Performed by: INTERNAL MEDICINE

## 2020-02-28 PROCEDURE — 3075F SYST BP GE 130 - 139MM HG: CPT | Performed by: INTERNAL MEDICINE

## 2020-02-28 PROCEDURE — 3008F BODY MASS INDEX DOCD: CPT | Performed by: INTERNAL MEDICINE

## 2020-02-28 NOTE — PROGRESS NOTES
Assessment/Plan:         Diagnoses and all orders for this visit:    Paroxysmal ventricular tachycardia ([address] )  -     Lipid panel; Future  -     Comprehensive metabolic panel; Future  Coordinate cardiology appt  Continue current rx Has pacer check scheduled    Diverticulosis  Increase fiber and water intake     Vitamin D deficiency  -     Vitamin D 25 hydroxy; Future  Recheck labs ordered     Hypercholesterolemia  Rx for fbw Lipid panel    Rt0 4 months             Patient ID: Lord Hill is a 80 y o  male  HPI  Pt generally well He has not been able to schedule cardio followup yet No chest pain or sob He is able to manage his household and looking forward to gardening Appetite good No recurrent bowel issues No abdominal pain Bowels are back to normal   Vision stable - got new glasses No uri sxs       Review of Systems   Constitutional: Positive for activity change  Negative for chills and fever  HENT: Negative  Respiratory: Negative  Cardiovascular: Negative  Gastrointestinal: Negative  Genitourinary: Negative  Musculoskeletal: Positive for arthralgias  Skin: Negative  Neurological: Negative for dizziness and headaches  Hematological: Negative  Psychiatric/Behavioral: Negative          Past Medical History:   Diagnosis Date    Arthritis     Atrial fibrillation (HCC)     Last Assessed:8/10/17    Benign prostatic hyperplasia     Last Assessed:1/28/14    BPH (benign prostatic hyperplasia)     Complete atrioventricular block (HCC)     Diverticulosis     Last Assessed:4/30/13    Hypercholesterolemia     Hypertension     Paroxysmal ventricular tachycardia (HCC)     Last Assessed:7/20/17    Prostatitis     Last Assessed:12/19/12    Pulmonary artery hypertension (HCC)     mild pulmonary htn    Retention, urine     Right bundle branch block     Last Assessed:11/26/13    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     Last VJJSYXPCS:27/0/52     Past Surgical History:   Procedure Laterality Date    CARDIAC PACEMAKER PLACEMENT      St  Judes DC PPM    COLONOSCOPY      HEMORRHOID SURGERY      Cauterization of hemorrhoids    HERNIA REPAIR      INCISION AND DRAINAGE ABSCESS / HEMATOMA OF BURSA / KNEE / Eladia Pian      Fatty Tumor Removed    KNEE SURGERY Left     Steel Removes from left knee     Social History     Socioeconomic History    Marital status:       Spouse name: Not on file    Number of children: Not on file    Years of education: Not on file    Highest education level: Not on file   Occupational History    Occupation: retired   Social Needs    Financial resource strain: Not on file    Food insecurity:     Worry: Not on file     Inability: Not on file   [employer] needs:     Medical: Not on file     Non-medical: Not on file   Tobacco Use    Smoking status: Former Smoker     Last attempt to quit:      Years since quittin 2    Smokeless tobacco: Never Used   Substance and Sexual Activity    Alcohol use: Not Currently     Alcohol/week: 0 0 standard drinks     Frequency: Monthly or less     Binge frequency: Never     Comment: social drinker    Drug use: No    Sexual activity: Not on file   Lifestyle    Physical activity:     Days per week: Not on file     Minutes per session: Not on file    Stress: Not on file   Relationships    Social connections:     Talks on phone: Not on file     Gets together: Not on file     Attends Oriental orthodox service: Not on file     Active member of club or organization: Not on file     Attends meetings of clubs or organizations: Not on file     Relationship status: Not on file    Intimate partner violence:     Fear of current or ex partner: Not on file     Emotionally abused: Not on file     Physically abused: Not on file     Forced sexual activity: Not on file   Other Topics Concern    Not on file   Social History Narrative    Advanced directive discussed with patient    Always uses seatbelt    Always uses sunscreen    Caffeine use- 1 soda on occasion    Regular dental care     Allergies   Allergen Reactions    Ibuprofen Hives     BMI Counseling: Body mass index is 25 95 kg/m²  The BMI is above normal  Nutrition recommendations include moderation in carbohydrate intake  Exercise recommendations include strength training exercises  /74   Pulse 69   Temp (!) 96 7 °F (35 9 °C) (Tympanic)   Ht 6' 2" (1 88 m)   Wt 91 7 kg (202 lb 2 oz)   SpO2 99%   BMI 25 95 kg/m²          Physical Exam   Constitutional: He is oriented to person, place, and time  He appears well-developed and well-nourished  No distress  HENT:   Head: Normocephalic and atraumatic  Mouth/Throat: Oropharynx is clear and moist  No oropharyngeal exudate  Eyes: Pupils are equal, round, and reactive to light  Conjunctivae and EOM are normal  No scleral icterus  Neck: Normal range of motion  Neck supple  No JVD present  Cardiovascular: Normal rate and regular rhythm  Murmur heard  Pulmonary/Chest: Effort normal and breath sounds normal  No respiratory distress  He has no wheezes  Abdominal: Soft  Bowel sounds are normal  He exhibits no distension  There is no tenderness  Musculoskeletal: Normal range of motion  He exhibits no edema  Lymphadenopathy:     He has no cervical adenopathy  Neurological: He is alert and oriented to person, place, and time  He displays abnormal reflex  No cranial nerve deficit  He exhibits abnormal muscle tone  Coordination abnormal    Skin: Skin is warm and dry  Capillary refill takes less than 2 seconds  He is not diaphoretic  Psychiatric: He has a normal mood and affect  His behavior is normal  Judgment and thought content normal    Nursing note and vitals reviewed

## 2020-04-17 DIAGNOSIS — R33.9 URINARY RETENTION: ICD-10-CM

## 2020-04-17 DIAGNOSIS — N40.0 BENIGN PROSTATIC HYPERPLASIA, UNSPECIFIED WHETHER LOWER URINARY TRACT SYMPTOMS PRESENT: ICD-10-CM

## 2020-04-17 RX ORDER — FINASTERIDE 5 MG/1
5 TABLET, FILM COATED ORAL DAILY
Qty: 90 TABLET | Refills: 1 | Status: SHIPPED | OUTPATIENT
Start: 2020-04-17 | End: 2020-10-22 | Stop reason: SDUPTHER

## 2020-04-20 RX ORDER — TAMSULOSIN HYDROCHLORIDE 0.4 MG/1
0.4 CAPSULE ORAL DAILY
Qty: 90 CAPSULE | Refills: 5 | Status: SHIPPED | OUTPATIENT
Start: 2020-04-20 | End: 2021-08-12

## 2020-05-28 ENCOUNTER — REMOTE DEVICE CLINIC VISIT (OUTPATIENT)
Dept: CARDIOLOGY CLINIC | Facility: CLINIC | Age: 85
End: 2020-05-28
Payer: MEDICARE

## 2020-05-28 DIAGNOSIS — Z95.0 CARDIAC PACEMAKER IN SITU: Primary | ICD-10-CM

## 2020-05-28 PROCEDURE — 93296 REM INTERROG EVL PM/IDS: CPT | Performed by: INTERNAL MEDICINE

## 2020-05-28 PROCEDURE — 93294 REM INTERROG EVL PM/LDLS PM: CPT | Performed by: INTERNAL MEDICINE

## 2020-06-26 ENCOUNTER — OFFICE VISIT (OUTPATIENT)
Dept: CARDIOLOGY CLINIC | Facility: HOSPITAL | Age: 85
End: 2020-06-26
Payer: MEDICARE

## 2020-06-26 VITALS
BODY MASS INDEX: 24.95 KG/M2 | WEIGHT: 194.4 LBS | SYSTOLIC BLOOD PRESSURE: 130 MMHG | HEART RATE: 60 BPM | DIASTOLIC BLOOD PRESSURE: 70 MMHG | HEIGHT: 74 IN

## 2020-06-26 DIAGNOSIS — I48.21 PERMANENT ATRIAL FIBRILLATION (HCC): ICD-10-CM

## 2020-06-26 DIAGNOSIS — Z95.0 PRESENCE OF PERMANENT CARDIAC PACEMAKER: ICD-10-CM

## 2020-06-26 DIAGNOSIS — I45.10 RBBB: Primary | ICD-10-CM

## 2020-06-26 DIAGNOSIS — I47.2 PAROXYSMAL VENTRICULAR TACHYCARDIA (HCC): ICD-10-CM

## 2020-06-26 PROCEDURE — 99214 OFFICE O/P EST MOD 30 MIN: CPT | Performed by: INTERNAL MEDICINE

## 2020-06-26 PROCEDURE — 1036F TOBACCO NON-USER: CPT | Performed by: INTERNAL MEDICINE

## 2020-06-26 PROCEDURE — 3075F SYST BP GE 130 - 139MM HG: CPT | Performed by: INTERNAL MEDICINE

## 2020-06-26 PROCEDURE — 4040F PNEUMOC VAC/ADMIN/RCVD: CPT | Performed by: INTERNAL MEDICINE

## 2020-06-26 PROCEDURE — 3078F DIAST BP <80 MM HG: CPT | Performed by: INTERNAL MEDICINE

## 2020-06-26 PROCEDURE — 3008F BODY MASS INDEX DOCD: CPT | Performed by: INTERNAL MEDICINE

## 2020-06-26 PROCEDURE — 1160F RVW MEDS BY RX/DR IN RCRD: CPT | Performed by: INTERNAL MEDICINE

## 2020-07-08 ENCOUNTER — APPOINTMENT (OUTPATIENT)
Dept: LAB | Facility: HOSPITAL | Age: 85
End: 2020-07-08
Attending: INTERNAL MEDICINE
Payer: MEDICARE

## 2020-07-08 DIAGNOSIS — E55.9 VITAMIN D DEFICIENCY: ICD-10-CM

## 2020-07-08 DIAGNOSIS — I47.2 PAROXYSMAL VENTRICULAR TACHYCARDIA (HCC): ICD-10-CM

## 2020-07-08 LAB
25(OH)D3 SERPL-MCNC: 35.6 NG/ML (ref 30–100)
ALBUMIN SERPL BCP-MCNC: 3.8 G/DL (ref 3.5–5)
ALP SERPL-CCNC: 58 U/L (ref 46–116)
ALT SERPL W P-5'-P-CCNC: 19 U/L (ref 12–78)
ANION GAP SERPL CALCULATED.3IONS-SCNC: 5 MMOL/L (ref 4–13)
AST SERPL W P-5'-P-CCNC: 16 U/L (ref 5–45)
BILIRUB SERPL-MCNC: 1 MG/DL (ref 0.2–1)
BUN SERPL-MCNC: 23 MG/DL (ref 5–25)
CALCIUM SERPL-MCNC: 9.2 MG/DL (ref 8.3–10.1)
CHLORIDE SERPL-SCNC: 103 MMOL/L (ref 100–108)
CHOLEST SERPL-MCNC: 156 MG/DL (ref 50–200)
CO2 SERPL-SCNC: 27 MMOL/L (ref 21–32)
CREAT SERPL-MCNC: 1.15 MG/DL (ref 0.6–1.3)
GFR SERPL CREATININE-BSD FRML MDRD: 55 ML/MIN/1.73SQ M
GLUCOSE P FAST SERPL-MCNC: 96 MG/DL (ref 65–99)
HDLC SERPL-MCNC: 38 MG/DL
LDLC SERPL CALC-MCNC: 100 MG/DL (ref 0–100)
NONHDLC SERPL-MCNC: 118 MG/DL
POTASSIUM SERPL-SCNC: 4.6 MMOL/L (ref 3.5–5.3)
PROT SERPL-MCNC: 7.5 G/DL (ref 6.4–8.2)
SODIUM SERPL-SCNC: 135 MMOL/L (ref 136–145)
TRIGL SERPL-MCNC: 88 MG/DL

## 2020-07-08 PROCEDURE — 80053 COMPREHEN METABOLIC PANEL: CPT

## 2020-07-08 PROCEDURE — 82306 VITAMIN D 25 HYDROXY: CPT

## 2020-07-08 PROCEDURE — 36415 COLL VENOUS BLD VENIPUNCTURE: CPT

## 2020-07-08 PROCEDURE — 80061 LIPID PANEL: CPT

## 2020-07-15 ENCOUNTER — OFFICE VISIT (OUTPATIENT)
Dept: INTERNAL MEDICINE CLINIC | Facility: CLINIC | Age: 85
End: 2020-07-15
Payer: MEDICARE

## 2020-07-15 VITALS
HEIGHT: 74 IN | SYSTOLIC BLOOD PRESSURE: 124 MMHG | OXYGEN SATURATION: 96 % | TEMPERATURE: 97 F | HEART RATE: 66 BPM | BODY MASS INDEX: 25.3 KG/M2 | DIASTOLIC BLOOD PRESSURE: 70 MMHG | WEIGHT: 197.13 LBS

## 2020-07-15 DIAGNOSIS — Z00.00 MEDICARE ANNUAL WELLNESS VISIT, SUBSEQUENT: Primary | ICD-10-CM

## 2020-07-15 PROCEDURE — 1170F FXNL STATUS ASSESSED: CPT | Performed by: INTERNAL MEDICINE

## 2020-07-15 PROCEDURE — G0439 PPPS, SUBSEQ VISIT: HCPCS | Performed by: INTERNAL MEDICINE

## 2020-07-15 PROCEDURE — 1160F RVW MEDS BY RX/DR IN RCRD: CPT | Performed by: INTERNAL MEDICINE

## 2020-07-15 PROCEDURE — 4040F PNEUMOC VAC/ADMIN/RCVD: CPT | Performed by: INTERNAL MEDICINE

## 2020-07-15 PROCEDURE — 1123F ACP DISCUSS/DSCN MKR DOCD: CPT | Performed by: INTERNAL MEDICINE

## 2020-07-15 PROCEDURE — 3074F SYST BP LT 130 MM HG: CPT | Performed by: INTERNAL MEDICINE

## 2020-07-15 PROCEDURE — 3008F BODY MASS INDEX DOCD: CPT | Performed by: INTERNAL MEDICINE

## 2020-07-15 PROCEDURE — 3078F DIAST BP <80 MM HG: CPT | Performed by: INTERNAL MEDICINE

## 2020-07-15 PROCEDURE — 1036F TOBACCO NON-USER: CPT | Performed by: INTERNAL MEDICINE

## 2020-07-15 PROCEDURE — 1125F AMNT PAIN NOTED PAIN PRSNT: CPT | Performed by: INTERNAL MEDICINE

## 2020-07-15 NOTE — PROGRESS NOTES
Assessment and Plan:     Problem List Items Addressed This Visit        Other    Medicare annual wellness visit, subsequent - Primary      Reviewed recent labs  Increase water intake  Cardio UTD and note reviewed  Eye exam utd  Increase fiber   Rto 3 months       Preventive health issues were discussed with patient, and age appropriate screening tests were ordered as noted in patient's After Visit Summary  Personalized health advice and appropriate referrals for health education or preventive services given if needed, as noted in patient's After Visit Summary       History of Present Illness:     Patient presents for Medicare Annual Wellness visit    Patient Care Team:  Jayson Goff DO as PCP - General  MD Pino Rosenberg MD Diego Fray, DO     Problem List:     Patient Active Problem List   Diagnosis    Benign prostatic hyperplasia    Diverticulosis    Hypercholesterolemia    Pulmonary artery hypertension ([facility] [address] )    RBBB    Medicare annual wellness visit, subsequent    Permanent atrial fibrillation ([facility] [address] )    Presence of permanent cardiac pacemaker      Past Medical and Surgical History:     Past Medical History:   Diagnosis Date    Arthritis     Atrial fibrillation ([facility] [address] )     Last Assessed:8/10/17    Benign prostatic hyperplasia     Last Assessed:1/28/14    BPH (benign prostatic hyperplasia)     Complete atrioventricular block ([facility] [address] )     Diverticulosis     Last Assessed:4/30/13    Hypercholesterolemia     Hypertension     Paroxysmal ventricular tachycardia ([facility] [address] )     Last Assessed:7/20/17    Prostatitis     Last Assessed:12/19/12    Pulmonary artery hypertension (HCC)     mild pulmonary htn    Retention, urine     Right bundle branch block     Last Assessed:11/26/13    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     Last XDBWEGVPU:26/7/28     Past Surgical History:   Procedure Laterality Date    CARDIAC PACEMAKER PLACEMENT      St  Judes DC PPM    COLONOSCOPY      HEMORRHOID SURGERY      Cauterization of hemorrhoids    HERNIA REPAIR      INCISION AND DRAINAGE ABSCESS / HEMATOMA OF BURSA / KNEE / THIGH      Fatty Tumor Removed    KNEE SURGERY Left     Steel Removes from left knee      Family History:     Family History   Problem Relation Age of Onset    Hypertension Mother     Stroke Mother         Stroke Syndrome    Other Sister         pacemaker placement    Prostate cancer Brother     Diabetes Family       Social History:     E-Cigarette/Vaping    E-Cigarette Use Never User      E-Cigarette/Vaping Substances    Nicotine No     THC No     CBD No     Flavoring No     Other No     Unknown No      Social History     Socioeconomic History    Marital status:       Spouse name: None    Number of children: None    Years of education: None    Highest education level: None   Occupational History    Occupation: retired   Social Needs    Financial resource strain: None    Food insecurity:     Worry: None     Inability: None    Transportation needs:     Medical: None     Non-medical: None   Tobacco Use    Smoking status: Former Smoker     Last attempt to quit:      Years since quittin 5    Smokeless tobacco: Never Used   Substance and Sexual Activity    Alcohol use: Not Currently     Alcohol/week: 0 0 standard drinks     Frequency: Monthly or less     Binge frequency: Never     Comment: social drinker    Drug use: No    Sexual activity: None   Lifestyle    Physical activity:     Days per week: None     Minutes per session: None    Stress: None   Relationships    Social connections:     Talks on phone: None     Gets together: None     Attends Protestant service: None     Active member of club or organization: None     Attends meetings of clubs or organizations: None     Relationship status: None    Intimate partner violence:     Fear of current or ex partner: None     Emotionally abused: None     Physically abused: None     Forced sexual activity: None Other Topics Concern    None   Social History Narrative    Advanced directive discussed with patient    Always uses seatbelt    Always uses sunscreen    Caffeine use- 1 soda on occasion    Regular dental care      Medications and Allergies:     Current Outpatient Medications   Medication Sig Dispense Refill    Ascorbic Acid (VITAMIN C) 1000 MG tablet Take 1 tablet by mouth daily      aspirin 325 mg tablet Take 1 tablet by mouth daily      Cholecalciferol (VITAMIN D3) 1000 units CAPS Take 1 tablet by mouth daily      cyanocobalamin (VITAMIN B-12) 100 mcg tablet Take 1 tablet by mouth daily      finasteride (PROSCAR) 5 mg tablet Take 1 tablet (5 mg total) by mouth daily 90 tablet 1    metoprolol succinate (TOPROL-XL) 50 mg 24 hr tablet TAKE 1 5 TABLET DAILY 135 tablet 3    Multiple Vitamins-Minerals (ICAPS AREDS 2 PO) Take by mouth      Red Yeast Rice 600 MG TABS Take by mouth daily      tamsulosin (FLOMAX) 0 4 mg Take 1 capsule (0 4 mg total) by mouth daily 90 capsule 5     No current facility-administered medications for this visit  Allergies   Allergen Reactions    Ibuprofen Hives      Immunizations:     Immunization History   Administered Date(s) Administered    Influenza Split High Dose Preservative Free IM 10/10/2013, 10/31/2014, 10/31/2014, 10/13/2015, 10/12/2016, 11/16/2017, 10/14/2019    Influenza, high dose seasonal 0 5 mL 09/26/2018    Pneumococcal Conjugate 13-Valent 09/26/2018    Pneumococcal Polysaccharide PPV23 10/10/2013    TD (adult) Preservative Free 07/27/2012      Health Maintenance: There are no preventive care reminders to display for this patient  Topic Date Due    Influenza Vaccine  07/01/2020      Medicare Health Risk Assessment:     /70   Pulse 66   Temp (!) 97 °F (36 1 °C) (Tympanic)   Ht 6' 2" (1 88 m)   Wt 89 4 kg (197 lb 2 oz)   SpO2 96%   BMI 25 31 kg/m²      Hiram Tracy is here for his Subsequent Wellness visit   Last Medicare Wellness visit information reviewed, patient interviewed and updates made to the record today  Health Risk Assessment:   Patient rates overall health as good  Patient feels that their physical health rating is same  Eyesight was rated as same  Hearing was rated as same  Patient feels that their emotional and mental health rating is same  Pain experienced in the last 7 days has been a lot  Patient's pain rating has been 9/10  Patient states that he has experienced no weight loss or gain in last 6 months  Depression Screening:   PHQ-2 Score: 0      Fall Risk Screening: In the past year, patient has experienced: no history of falling in past year      Home Safety:  Patient has trouble with stairs inside or outside of their home  Patient has working smoke alarms and has no working carbon monoxide detector  Home safety hazards include: none  Nutrition:   Current diet is Regular  Medications:   Patient is currently taking over-the-counter supplements  OTC medications include: see medication list  Patient is able to manage medications  Activities of Daily Living (ADLs)/Instrumental Activities of Daily Living (IADLs):   Walk and transfer into and out of bed and chair?: Yes  Dress and groom yourself?: Yes    Bathe or shower yourself?: Yes    Feed yourself? Yes  Do your laundry/housekeeping?: Yes  Manage your money, pay your bills and track your expenses?: Yes  Make your own meals?: Yes    Do your own shopping?: Yes    ADL comments: Daughter helps with meals and meal planning    Previous Hospitalizations:   Any hospitalizations or ED visits within the last 12 months?: No      Advance Care Planning:   Living will: Yes    Durable POA for healthcare:  Yes    Advanced directive: Yes    End of Life Decisions reviewed with patient: Yes    Provider agrees with end of life decisions: Yes      Cognitive Screening:   Provider or family/friend/caregiver concerned regarding cognition?: No    PREVENTIVE SCREENINGS Cardiovascular Screening:    General: Screening Not Indicated and History Lipid Disorder      Diabetes Screening:     General: Screening Current      Colorectal Cancer Screening:     General: Screening Not Indicated      Prostate Cancer Screening:    General: Screening Not Indicated      Osteoporosis Screening:    General: Screening Not Indicated      Abdominal Aortic Aneurysm (AAA) Screening:    Risk factors include: tobacco use        Lung Cancer Screening:     General: Screening Not Indicated      Hepatitis C Screening:    General: Screening Not Indicated    Other Counseling Topics:   Regular weightbearing exercise         Kalie Shelby DO

## 2020-08-03 DIAGNOSIS — I10 ESSENTIAL HYPERTENSION: ICD-10-CM

## 2020-08-03 RX ORDER — METOPROLOL SUCCINATE 50 MG/1
TABLET, EXTENDED RELEASE ORAL
Qty: 135 TABLET | Refills: 3 | Status: SHIPPED | OUTPATIENT
Start: 2020-08-03 | End: 2021-08-12

## 2020-09-10 ENCOUNTER — IN-CLINIC DEVICE VISIT (OUTPATIENT)
Dept: CARDIOLOGY CLINIC | Facility: HOSPITAL | Age: 85
End: 2020-09-10
Payer: MEDICARE

## 2020-09-10 DIAGNOSIS — Z95.0 PRESENCE OF CARDIAC PACEMAKER: Primary | ICD-10-CM

## 2020-09-10 PROCEDURE — 93279 PRGRMG DEV EVAL PM/LDLS PM: CPT | Performed by: INTERNAL MEDICINE

## 2020-09-10 NOTE — PROGRESS NOTES
Results for orders placed or performed in visit on 09/10/20   Cardiac EP device report    Narrative    SJM-DUAL CHAMBER PPM (VVIR 60)/ NOT MRI CONDITIONAL  DEVICE INTERROGATED IN [facility]: BATTERY VOLTAGE ADEQUATE (6 1 YRS)  : 99% (>40%~CHB)  ALL LEAD PARAMETERS WITHIN NORMAL LIMITS  NO SIGNIFICANT HIGH RATE EPISODES  PT DOES NOT TAKE AC DUE TO HIGH RISK  PT TAKES ASA 325MG, METOPROLOL SUCC  EF: 65-70% (ECHO 01/07/16)  NO PROGRAMMING CHANGES MADE TO DEVICE PARAMETERS  PACEMAKER FUNCTIONING APPROPRIATELY    [address] [place] [address]

## 2020-09-18 ENCOUNTER — TELEPHONE (OUTPATIENT)
Dept: INTERNAL MEDICINE CLINIC | Facility: CLINIC | Age: 85
End: 2020-09-18

## 2020-09-18 DIAGNOSIS — H92.09 EARACHE: Primary | ICD-10-CM

## 2020-09-18 RX ORDER — AMOXICILLIN 500 MG/1
500 CAPSULE ORAL EVERY 8 HOURS SCHEDULED
Qty: 21 CAPSULE | Refills: 0 | Status: SHIPPED | OUTPATIENT
Start: 2020-09-18 | End: 2020-09-25

## 2020-09-18 NOTE — TELEPHONE ENCOUNTER
Patient has c/o Right sided earache, states he can barely get his hearing aide in due to pain and swelling  States this started yesterday  No other symptoms       All-ibuprofen  [facility]

## 2020-10-22 ENCOUNTER — OFFICE VISIT (OUTPATIENT)
Dept: INTERNAL MEDICINE CLINIC | Facility: CLINIC | Age: 85
End: 2020-10-22
Payer: MEDICARE

## 2020-10-22 VITALS
TEMPERATURE: 97.5 F | WEIGHT: 195.13 LBS | HEIGHT: 74 IN | HEART RATE: 65 BPM | BODY MASS INDEX: 25.04 KG/M2 | DIASTOLIC BLOOD PRESSURE: 78 MMHG | OXYGEN SATURATION: 98 % | SYSTOLIC BLOOD PRESSURE: 124 MMHG

## 2020-10-22 DIAGNOSIS — I10 ESSENTIAL (PRIMARY) HYPERTENSION: ICD-10-CM

## 2020-10-22 DIAGNOSIS — Z23 FLU VACCINE NEED: Primary | ICD-10-CM

## 2020-10-22 DIAGNOSIS — I48.91 ATRIAL FIBRILLATION, UNSPECIFIED TYPE (HCC): ICD-10-CM

## 2020-10-22 DIAGNOSIS — R33.9 URINARY RETENTION: ICD-10-CM

## 2020-10-22 PROCEDURE — G0008 ADMIN INFLUENZA VIRUS VAC: HCPCS

## 2020-10-22 PROCEDURE — 90662 IIV NO PRSV INCREASED AG IM: CPT

## 2020-10-22 PROCEDURE — 99214 OFFICE O/P EST MOD 30 MIN: CPT | Performed by: INTERNAL MEDICINE

## 2020-10-22 RX ORDER — FINASTERIDE 5 MG/1
5 TABLET, FILM COATED ORAL DAILY
Qty: 90 TABLET | Refills: 1 | Status: SHIPPED | OUTPATIENT
Start: 2020-10-22 | End: 2021-04-15

## 2020-12-14 ENCOUNTER — REMOTE DEVICE CLINIC VISIT (OUTPATIENT)
Dept: CARDIOLOGY CLINIC | Facility: CLINIC | Age: 85
End: 2020-12-14
Payer: MEDICARE

## 2020-12-14 DIAGNOSIS — Z95.0 PRESENCE OF CARDIAC PACEMAKER: Primary | ICD-10-CM

## 2020-12-14 PROCEDURE — 93294 REM INTERROG EVL PM/LDLS PM: CPT | Performed by: INTERNAL MEDICINE

## 2020-12-14 PROCEDURE — 93296 REM INTERROG EVL PM/IDS: CPT | Performed by: INTERNAL MEDICINE

## 2021-03-15 ENCOUNTER — REMOTE DEVICE CLINIC VISIT (OUTPATIENT)
Dept: CARDIOLOGY CLINIC | Facility: CLINIC | Age: 86
End: 2021-03-15
Payer: MEDICARE

## 2021-03-15 DIAGNOSIS — Z95.0 PRESENCE OF PERMANENT CARDIAC PACEMAKER: Primary | ICD-10-CM

## 2021-03-15 PROCEDURE — 93296 REM INTERROG EVL PM/IDS: CPT | Performed by: INTERNAL MEDICINE

## 2021-03-15 PROCEDURE — 93294 REM INTERROG EVL PM/LDLS PM: CPT | Performed by: INTERNAL MEDICINE

## 2021-03-15 NOTE — PROGRESS NOTES
Results for orders placed or performed in visit on 03/15/21   Cardiac EP device report    Narrative    SJM-DUAL CHAMBER PPM (VVIR 60)/ NOT MRI CONDITIONAL  MERLIN TRANSMISSION: BATTERY VOLTAGE ADEQUATE  (5 4 YRS)  95%  ALL AVAILABLE LEAD PARAMETERS WITHIN NORMAL LIMITS  NO SIGNIFICANT HIGH RATE EPISODES  NORMAL DEVICE FUNCTION  ---BALES

## 2021-03-22 ENCOUNTER — OFFICE VISIT (OUTPATIENT)
Dept: INTERNAL MEDICINE CLINIC | Facility: CLINIC | Age: 86
End: 2021-03-22
Payer: MEDICARE

## 2021-03-22 ENCOUNTER — APPOINTMENT (OUTPATIENT)
Dept: LAB | Facility: MEDICAL CENTER | Age: 86
End: 2021-03-22
Payer: MEDICARE

## 2021-03-22 ENCOUNTER — TRANSCRIBE ORDERS (OUTPATIENT)
Dept: LAB | Facility: MEDICAL CENTER | Age: 86
End: 2021-03-22

## 2021-03-22 VITALS
SYSTOLIC BLOOD PRESSURE: 126 MMHG | BODY MASS INDEX: 25.84 KG/M2 | HEIGHT: 74 IN | TEMPERATURE: 97 F | WEIGHT: 201.38 LBS | DIASTOLIC BLOOD PRESSURE: 70 MMHG

## 2021-03-22 DIAGNOSIS — E78.00 HYPERCHOLESTEROLEMIA: ICD-10-CM

## 2021-03-22 DIAGNOSIS — K59.00 CONSTIPATION, UNSPECIFIED CONSTIPATION TYPE: ICD-10-CM

## 2021-03-22 DIAGNOSIS — I27.21 PULMONARY ARTERY HYPERTENSION (HCC): Primary | ICD-10-CM

## 2021-03-22 DIAGNOSIS — I48.91 ATRIAL FIBRILLATION, UNSPECIFIED TYPE (HCC): ICD-10-CM

## 2021-03-22 DIAGNOSIS — R33.9 URINARY RETENTION: ICD-10-CM

## 2021-03-22 DIAGNOSIS — I10 ESSENTIAL (PRIMARY) HYPERTENSION: ICD-10-CM

## 2021-03-22 LAB
ALBUMIN SERPL BCP-MCNC: 4.2 G/DL (ref 3.5–5)
ALP SERPL-CCNC: 54 U/L (ref 46–116)
ALT SERPL W P-5'-P-CCNC: 26 U/L (ref 12–78)
ANION GAP SERPL CALCULATED.3IONS-SCNC: 1 MMOL/L (ref 4–13)
AST SERPL W P-5'-P-CCNC: 15 U/L (ref 5–45)
BASOPHILS # BLD AUTO: 0.05 THOUSANDS/ΜL (ref 0–0.1)
BASOPHILS NFR BLD AUTO: 1 % (ref 0–1)
BILIRUB SERPL-MCNC: 0.85 MG/DL (ref 0.2–1)
BUN SERPL-MCNC: 24 MG/DL (ref 5–25)
CALCIUM SERPL-MCNC: 10 MG/DL (ref 8.3–10.1)
CHLORIDE SERPL-SCNC: 106 MMOL/L (ref 100–108)
CO2 SERPL-SCNC: 31 MMOL/L (ref 21–32)
CREAT SERPL-MCNC: 1.2 MG/DL (ref 0.6–1.3)
EOSINOPHIL # BLD AUTO: 0.08 THOUSAND/ΜL (ref 0–0.61)
EOSINOPHIL NFR BLD AUTO: 1 % (ref 0–6)
ERYTHROCYTE [DISTWIDTH] IN BLOOD BY AUTOMATED COUNT: 14.4 % (ref 11.6–15.1)
GFR SERPL CREATININE-BSD FRML MDRD: 52 ML/MIN/1.73SQ M
GLUCOSE P FAST SERPL-MCNC: 79 MG/DL (ref 65–99)
HCT VFR BLD AUTO: 46.6 % (ref 36.5–49.3)
HGB BLD-MCNC: 15 G/DL (ref 12–17)
IMM GRANULOCYTES # BLD AUTO: 0.11 THOUSAND/UL (ref 0–0.2)
IMM GRANULOCYTES NFR BLD AUTO: 1 % (ref 0–2)
LDLC SERPL DIRECT ASSAY-MCNC: 120 MG/DL (ref 0–100)
LYMPHOCYTES # BLD AUTO: 1.47 THOUSANDS/ΜL (ref 0.6–4.47)
LYMPHOCYTES NFR BLD AUTO: 19 % (ref 14–44)
MCH RBC QN AUTO: 30.4 PG (ref 26.8–34.3)
MCHC RBC AUTO-ENTMCNC: 32.2 G/DL (ref 31.4–37.4)
MCV RBC AUTO: 94 FL (ref 82–98)
MONOCYTES # BLD AUTO: 0.68 THOUSAND/ΜL (ref 0.17–1.22)
MONOCYTES NFR BLD AUTO: 9 % (ref 4–12)
NEUTROPHILS # BLD AUTO: 5.36 THOUSANDS/ΜL (ref 1.85–7.62)
NEUTS SEG NFR BLD AUTO: 69 % (ref 43–75)
NRBC BLD AUTO-RTO: 0 /100 WBCS
PLATELET # BLD AUTO: 144 THOUSANDS/UL (ref 149–390)
PMV BLD AUTO: 10.9 FL (ref 8.9–12.7)
POTASSIUM SERPL-SCNC: 4.8 MMOL/L (ref 3.5–5.3)
PROT SERPL-MCNC: 7.7 G/DL (ref 6.4–8.2)
RBC # BLD AUTO: 4.94 MILLION/UL (ref 3.88–5.62)
SODIUM SERPL-SCNC: 138 MMOL/L (ref 136–145)
WBC # BLD AUTO: 7.75 THOUSAND/UL (ref 4.31–10.16)

## 2021-03-22 PROCEDURE — 83721 ASSAY OF BLOOD LIPOPROTEIN: CPT

## 2021-03-22 PROCEDURE — 85025 COMPLETE CBC W/AUTO DIFF WBC: CPT

## 2021-03-22 PROCEDURE — 36415 COLL VENOUS BLD VENIPUNCTURE: CPT

## 2021-03-22 PROCEDURE — 80053 COMPREHEN METABOLIC PANEL: CPT

## 2021-03-22 PROCEDURE — 99214 OFFICE O/P EST MOD 30 MIN: CPT | Performed by: INTERNAL MEDICINE

## 2021-03-22 NOTE — PROGRESS NOTES
Assessment/Plan:         Diagnoses and all orders for this visit:    Pulmonary artery hypertension ([facility] [address] )  Pt will be due for cardio followup in June He is stable on current regimen  Going for labs today     Hypercholesterolemia  Low fat diet He does walk daily in AM about a mile as the weather allows    Constipation, unspecified constipation type  Increase water intake, walking as tolerated  Senna daily Hi fiber diet         Rto 6 months  He is fully vaccinated for covid     Patient ID: Tessa Lay is a 80 y o  male  HPI  Pt doing ok He is managing at home on his own still he will plant a smaller garden this year No falls he tries to walk about a mile in the AM when weather allows He drives locally No chest pain or sob He does get constipated - he eats fruits regularlBMI Counseling: Body mass index is 25 86 kg/m²  The BMI is above normal  Nutrition recommendations include consuming healthier snacks and moderation in carbohydrate intake  Exercise recommendations include exercising 3-5 times per week        y but does not drink much during the day No melena or abdominal pain Appetite good, no weight loss       Review of Systems   Constitutional: Negative  Negative for chills and fever  HENT: Negative  Respiratory: Negative  Cardiovascular: Negative for chest pain, palpitations and leg swelling  Gastrointestinal: Positive for constipation  Negative for abdominal distention, abdominal pain and nausea  Genitourinary: Negative  Musculoskeletal: Negative  Skin: Negative  Neurological: Negative for dizziness, light-headedness and headaches  Psychiatric/Behavioral: Negative for sleep disturbance  The patient is not nervous/anxious          Past Medical History:   Diagnosis Date    Arthritis     Atrial fibrillation (HCC)     Last Assessed:8/10/17    Benign prostatic hyperplasia     Last Assessed:1/28/14    BPH (benign prostatic hyperplasia)     Complete atrioventricular block ([address] )     Diverticulosis     Last Assessed:13    Hypercholesterolemia     Hypertension     Paroxysmal ventricular tachycardia (HCC)     Last Assessed:17    Prostatitis     Last Assessed:12    Pulmonary artery hypertension (HCC)     mild pulmonary htn    Retention, urine     Right bundle branch block     Last Assessed:13    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     Last [id number]:     Past Surgical History:   Procedure Laterality Date    CARDIAC PACEMAKER PLACEMENT      St  Judes DC PPM    COLONOSCOPY      HEMORRHOID SURGERY      Cauterization of hemorrhoids    HERNIA REPAIR      INCISION AND DRAINAGE ABSCESS / HEMATOMA OF BURSA / KNEE / [address] [place] [address] Av      Fatty Tumor Removed    KNEE SURGERY Left     Steel Removes from left knee     Social History     Socioeconomic History    Marital status:       Spouse name: Not on file    Number of children: Not on file    Years of education: Not on file    Highest education level: Not on file   Occupational History    Occupation: retired   Social Needs    Financial resource strain: Not on file    Food insecurity     Worry: Not on file     Inability: Not on file   [employer] needs     Medical: Not on file     Non-medical: Not on file   Tobacco Use    Smoking status: Former Smoker     Quit date:      Years since quittin 4    Smokeless tobacco: Never Used   Substance and Sexual Activity    Alcohol use: Not Currently     Alcohol/week: 0 0 standard drinks     Frequency: Monthly or less     Binge frequency: Never     Comment: social drinker    Drug use: No    Sexual activity: Not on file   Lifestyle    Physical activity     Days per week: Not on file     Minutes per session: Not on file    Stress: Not on file   Relationships    Social connections     Talks on phone: Not on file     Gets together: Not on file     Attends Baptist service: Not on file     Active member of club or organization: Not on file     Attends meetings of clubs or organizations: Not on file     Relationship status: Not on file    Intimate partner violence     Fear of current or ex partner: Not on file     Emotionally abused: Not on file     Physically abused: Not on file     Forced sexual activity: Not on file   Other Topics Concern    Not on file   Social History Narrative    Advanced directive discussed with patient    Always uses seatbelt    Always uses sunscreen    Caffeine use- 1 soda on occasion    Regular dental care     Allergies   Allergen Reactions    Ibuprofen Hives           Temp (!) 97 °F (36 1 °C) (Temporal)   Ht 6' 2" (1 88 m)   Wt 91 3 kg (201 lb 6 oz)   BMI 25 86 kg/m²          Physical Exam  Vitals signs reviewed  Constitutional:       General: He is not in acute distress  Appearance: Normal appearance  He is not ill-appearing, toxic-appearing or diaphoretic  HENT:      Head: Normocephalic and atraumatic  Right Ear: Tympanic membrane, ear canal and external ear normal  There is no impacted cerumen  Left Ear: Tympanic membrane, ear canal and external ear normal  There is no impacted cerumen  Nose: Nose normal       Mouth/Throat:      Mouth: Mucous membranes are dry  Eyes:      General: No scleral icterus  Extraocular Movements: Extraocular movements intact  Conjunctiva/sclera: Conjunctivae normal       Pupils: Pupils are equal, round, and reactive to light  Neck:      Musculoskeletal: Normal range of motion and neck supple  No neck rigidity or muscular tenderness  Cardiovascular:      Rate and Rhythm: Normal rate  Rhythm irregular  Pulses: Normal pulses  Heart sounds: Normal heart sounds  Pulmonary:      Effort: Pulmonary effort is normal  No respiratory distress  Breath sounds: Normal breath sounds  No wheezing  Abdominal:      General: Abdomen is flat  Bowel sounds are normal  There is no distension  Palpations: Abdomen is soft  Tenderness: There is no abdominal tenderness  Musculoskeletal: Normal range of motion  General: No tenderness  Right lower leg: No edema  Left lower leg: No edema  Lymphadenopathy:      Cervical: No cervical adenopathy  Skin:     General: Skin is warm and dry  Capillary Refill: Capillary refill takes less than 2 seconds  Coloration: Skin is not jaundiced or pale  Findings: Bruising present  No erythema  Neurological:      General: No focal deficit present  Mental Status: He is alert and oriented to person, place, and time  Mental status is at baseline  Cranial Nerves: No cranial nerve deficit  Psychiatric:         Mood and Affect: Mood normal          Behavior: Behavior normal          Thought Content:  Thought content normal          Judgment: Judgment normal

## 2021-04-15 DIAGNOSIS — R33.9 URINARY RETENTION: ICD-10-CM

## 2021-04-15 RX ORDER — FINASTERIDE 5 MG/1
TABLET, FILM COATED ORAL
Qty: 90 TABLET | Refills: 1 | Status: SHIPPED | OUTPATIENT
Start: 2021-04-15 | End: 2021-08-12

## 2021-06-10 ENCOUNTER — TELEPHONE (OUTPATIENT)
Dept: CARDIOLOGY CLINIC | Facility: HOSPITAL | Age: 86
End: 2021-06-10

## 2021-06-10 NOTE — TELEPHONE ENCOUNTER
Attempted to contact Tia Benavidez to schedule his next appointment with cardiology  A letter has been sent to have him contact our office

## 2021-06-14 ENCOUNTER — REMOTE DEVICE CLINIC VISIT (OUTPATIENT)
Dept: CARDIOLOGY CLINIC | Facility: CLINIC | Age: 86
End: 2021-06-14
Payer: MEDICARE

## 2021-06-14 DIAGNOSIS — Z95.0 CARDIAC PACEMAKER IN SITU: Primary | ICD-10-CM

## 2021-06-14 PROCEDURE — 93296 REM INTERROG EVL PM/IDS: CPT | Performed by: INTERNAL MEDICINE

## 2021-06-14 PROCEDURE — 93294 REM INTERROG EVL PM/LDLS PM: CPT | Performed by: INTERNAL MEDICINE

## 2021-06-14 NOTE — PROGRESS NOTES
Results for orders placed or performed in visit on 06/14/21   Cardiac EP device report    Narrative    SJM-DUAL CHAMBER PPM (VVIR 60)/ NOT MRI CONDITIONAL  MERLIN TRANSMISSION: BATTERY VOLTAGE ADEQUATE (4 8-5 YRS)  -98% (>40% [email or web address])  ALL AVAILABLE LEAD PARAMETERS WITHIN NORMAL LIMITS  NO SIGNIFICANT HIGH RATE EPISODES  NORMAL DEVICE FUNCTION   GV

## 2021-07-22 ENCOUNTER — OFFICE VISIT (OUTPATIENT)
Dept: INTERNAL MEDICINE CLINIC | Facility: CLINIC | Age: 86
End: 2021-07-22
Payer: MEDICARE

## 2021-07-22 VITALS
HEIGHT: 74 IN | TEMPERATURE: 97.6 F | SYSTOLIC BLOOD PRESSURE: 128 MMHG | HEART RATE: 63 BPM | BODY MASS INDEX: 25.44 KG/M2 | OXYGEN SATURATION: 99 % | DIASTOLIC BLOOD PRESSURE: 70 MMHG | WEIGHT: 198.25 LBS

## 2021-07-22 DIAGNOSIS — Z00.00 MEDICARE ANNUAL WELLNESS VISIT, SUBSEQUENT: Primary | ICD-10-CM

## 2021-07-22 DIAGNOSIS — E78.2 MIXED HYPERLIPIDEMIA: ICD-10-CM

## 2021-07-22 DIAGNOSIS — D69.6 PLATELETS DECREASED (HCC): ICD-10-CM

## 2021-07-22 DIAGNOSIS — I48.91 ATRIAL FIBRILLATION, UNSPECIFIED TYPE (HCC): ICD-10-CM

## 2021-07-22 PROCEDURE — 1123F ACP DISCUSS/DSCN MKR DOCD: CPT | Performed by: INTERNAL MEDICINE

## 2021-07-22 PROCEDURE — G0439 PPPS, SUBSEQ VISIT: HCPCS | Performed by: INTERNAL MEDICINE

## 2021-07-22 NOTE — PROGRESS NOTES
Assessment and Plan:     Problem List Items Addressed This Visit        Other    Medicare annual wellness visit, subsequent - Primary    Relevant Orders    Lipid panel    Comprehensive metabolic panel    Platelets decreased ([facility] )      Other Visit Diagnoses     Mixed hyperlipidemia        Relevant Orders    Lipid panel    Comprehensive metabolic panel    Atrial fibrillation, unspecified type [facility])          Cardiology appt in August  Rx for fbw  Fall precautions / has walking stick   Eye exam utd   Rto 3 months      Preventive health issues were discussed with patient, and age appropriate screening tests were ordered as noted in patient's After Visit Summary  Personalized health advice and appropriate referrals for health education or preventive services given if needed, as noted in patient's After Visit Summary       History of Present Illness:     Patient presents for Medicare Annual Wellness visit    Patient Care Team:  Junior Ochoa DO as PCP - General  MD Sonia Brand MD Danice Edwards, DO     Problem List:     Patient Active Problem List   Diagnosis    Benign prostatic hyperplasia    Diverticulosis    Hypercholesterolemia    Pulmonary artery hypertension ([facility] [address] )    RBBB    Medicare annual wellness visit, subsequent    Permanent atrial fibrillation ([facility] [address] )    Presence of permanent cardiac pacemaker    Constipation    Platelets decreased ([facility] )      Past Medical and Surgical History:     Past Medical History:   Diagnosis Date    Arthritis     Atrial fibrillation ([facility] [address] )     Last Assessed:8/10/17    Benign prostatic hyperplasia     Last Assessed:1/28/14    BPH (benign prostatic hyperplasia)     Complete atrioventricular block ([facility] [address] )     Diverticulosis     Last Assessed:4/30/13    Hypercholesterolemia     Hypertension     Paroxysmal ventricular tachycardia (HCC)     Last Assessed:7/20/17    Prostatitis     Last Assessed:12/19/12    Pulmonary artery hypertension (HCC)     mild pulmonary htn    Retention, urine     Right bundle branch block     Last Assessed:13    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     Last [id number]:28/3/49     Past Surgical History:   Procedure Laterality Date    CARDIAC PACEMAKER PLACEMENT      [facility] DC PPM    COLONOSCOPY      HEMORRHOID SURGERY      Cauterization of hemorrhoids    HERNIA REPAIR      INCISION AND DRAINAGE ABSCESS / HEMATOMA OF BURSA / Marijean Pablo / Chuy Cheo Hendricksonpartha      Fatty Tumor Removed    KNEE SURGERY Left     Steel Removes from left knee      Family History:     Family History   Problem Relation Age of Onset    Hypertension Mother     Stroke Mother         Stroke Syndrome    Other Sister         pacemaker placement    Prostate cancer Brother     Diabetes Family       Social History:     Social History     Socioeconomic History    Marital status:      Spouse name: None    Number of children: None    Years of education: None    Highest education level: None   Occupational History    Occupation: retired   Tobacco Use    Smoking status: Former Smoker     Quit date:      Years since quittin 5    Smokeless tobacco: Never Used   Vaping Use    Vaping Use: Never used   Substance and Sexual Activity    Alcohol use: Not Currently     Alcohol/week: 0 0 standard drinks     Comment: social drinker    Drug use: No    Sexual activity: None   Other Topics Concern    None   Social History Narrative    Advanced directive discussed with patient    Always uses seatbelt    Always uses sunscreen    Caffeine use- 1 soda on occasion    Regular dental care     Social Determinants of Health     Financial Resource Strain:     Difficulty of Paying Living Expenses:    Food Insecurity:     Worried About Running Out of Food in the Last Year:     [address] in the Last Year:    Transportation Needs:     Lack of Transportation (Medical):      Lack of Transportation (Non-Medical):    Physical Activity:     Days of Exercise per Week:     Minutes of Exercise per Session:    Stress:     Feeling of Stress :    Social Connections:     Frequency of Communication with Friends and Family:     Frequency of Social Gatherings with Friends and Family:     Attends Sikhism Services:     Active Member of Clubs or Organizations:     Attends Club or Organization Meetings:     Marital Status:    Intimate Partner Violence:     Fear of Current or Ex-Partner:     Emotionally Abused:     Physically Abused:     Sexually Abused:       Medications and Allergies:     Current Outpatient Medications   Medication Sig Dispense Refill    Ascorbic Acid (VITAMIN C) 1000 MG tablet Take 1 tablet by mouth daily      aspirin 325 mg tablet Take 1 tablet by mouth daily      Cholecalciferol (VITAMIN D3) 1000 units CAPS Take 1 tablet by mouth daily      cyanocobalamin (VITAMIN B-12) 100 mcg tablet Take 1 tablet by mouth daily      finasteride (PROSCAR) 5 mg tablet TAKE 1 TABLET BY MOUTH EVERY DAY 90 tablet 1    metoprolol succinate (TOPROL-XL) 50 mg 24 hr tablet TAKE 1 5 TABLET DAILY 135 tablet 3    Multiple Vitamins-Minerals (ICAPS AREDS 2 PO) Take by mouth      neomycin-polymyxin-hydrocortisone (CORTISPORIN) otic solution Administer 3 drops to the right ear 2 (two) times a day 10 mL 0    Red Yeast Rice 600 MG TABS Take by mouth daily      tamsulosin (FLOMAX) 0 4 mg Take 1 capsule (0 4 mg total) by mouth daily 90 capsule 5     No current facility-administered medications for this visit       Allergies   Allergen Reactions    Ibuprofen Hives      Immunizations:     Immunization History   Administered Date(s) Administered    Influenza Split High Dose Preservative Free IM 10/10/2013, 10/31/2014, 10/31/2014, 10/13/2015, 10/12/2016, 11/16/2017, 10/14/2019    Influenza, high dose seasonal 0 7 mL 09/26/2018, 10/22/2020    Pneumococcal Conjugate 13-Valent 09/26/2018    Pneumococcal Polysaccharide PPV23 10/10/2013    SARS-CoV-2 / COVID-19 mRNA IM (Moderna) 01/15/2021, 02/12/2021    TD (adult) Preservative Free 07/27/2012      Health Maintenance: There are no preventive care reminders to display for this patient  Topic Date Due    Influenza Vaccine (1) 09/01/2021      Medicare Health Risk Assessment:     /70   Pulse 63   Temp 97 6 °F (36 4 °C) (Temporal)   Ht 6' 2" (1 88 m)   Wt 89 9 kg (198 lb 4 oz)   SpO2 99%   BMI 25 45 kg/m²      Sandra Scruggs is here for his Subsequent Wellness visit  Last Medicare Wellness visit information reviewed, patient interviewed and updates made to the record today  Health Risk Assessment:   Patient rates overall health as good  Patient feels that their physical health rating is same  Patient is satisfied with their life  Eyesight was rated as same  Hearing was rated as same  Patient feels that their emotional and mental health rating is same  Patients states they are never, rarely angry  Patient states they are never, rarely unusually tired/fatigued  Pain experienced in the last 7 days has been none  Patient states that he has experienced no weight loss or gain in last 6 months  Depression Screening:   PHQ-2 Score: 0      Fall Risk Screening: In the past year, patient has experienced: no history of falling in past year      Home Safety:  Patient has trouble with stairs inside or outside of their home  Patient has working smoke alarms and has no working carbon monoxide detector  Home safety hazards include: none  Nutrition:   Current diet is Regular  Medications:   Patient is currently taking over-the-counter supplements  OTC medications include: see medication list  Patient is able to manage medications  Activities of Daily Living (ADLs)/Instrumental Activities of Daily Living (IADLs):   Walk and transfer into and out of bed and chair?: Yes  Dress and groom yourself?: Yes    Bathe or shower yourself?: Yes    Feed yourself?  Yes  Do your laundry/housekeeping?: Yes  Manage your money, pay your bills and track your expenses?: Yes  Make your own meals?: Yes    Do your own shopping?: Yes    ADL comments: Daughter helps with cooking    Previous Hospitalizations:   Any hospitalizations or ED visits within the last 12 months?: No      Advance Care Planning:   Living will: Yes    Durable POA for healthcare: Yes    Advanced directive: Yes    End of Life Decisions reviewed with patient: Yes    Provider agrees with end of life decisions: Yes      Cognitive Screening:   Provider or family/friend/caregiver concerned regarding cognition?: No    PREVENTIVE SCREENINGS      Cardiovascular Screening:    General: Screening Not Indicated and History Lipid Disorder      Diabetes Screening:     General: Screening Current      Colorectal Cancer Screening:     General: Screening Not Indicated      Prostate Cancer Screening:    General: Screening Not Indicated      Osteoporosis Screening:    General: Patient Declines      Abdominal Aortic Aneurysm (AAA) Screening:    Risk factors include: tobacco use        Lung Cancer Screening:     General: Screening Not Indicated      Hepatitis C Screening:    General: Risks and Benefits Discussed    Hep C Screening Accepted: Yes      Screening, Brief Intervention, and Referral to Treatment (SBIRT)    Screening  Typical number of drinks in a day: 0  Typical number of drinks in a week: 0  Interpretation: Low risk drinking behavior      AUDIT-C Screenin) How often did you have a drink containing alcohol in the past year? never  2) How many drinks did you have on a typical day when you were drinking in the past year? 0  3) How often did you have 6 or more drinks on one occasion in the past year? never    AUDIT-C Score: 0  Interpretation: Score 0-3 (male): Negative screen for alcohol misuse    Single Item Drug Screening:  How often have you used an illegal drug (including marijuana) or a prescription medication for non-medical reasons in the past year? never    Single Item Drug Screen Score: 0  Interpretation: Negative screen for possible drug use disorder    Brief Intervention  Alcohol & drug use screenings were reviewed  No concerns regarding substance use disorder identified  Other Counseling Topics:   Regular weightbearing exercise and calcium and vitamin D intake         Garrett Elena DO

## 2021-08-11 DIAGNOSIS — I10 ESSENTIAL HYPERTENSION: ICD-10-CM

## 2021-08-11 DIAGNOSIS — R33.9 URINARY RETENTION: ICD-10-CM

## 2021-08-11 DIAGNOSIS — N40.0 BENIGN PROSTATIC HYPERPLASIA, UNSPECIFIED WHETHER LOWER URINARY TRACT SYMPTOMS PRESENT: ICD-10-CM

## 2021-08-12 RX ORDER — FINASTERIDE 5 MG/1
TABLET, FILM COATED ORAL
Qty: 90 TABLET | Refills: 1 | Status: SHIPPED | OUTPATIENT
Start: 2021-08-12 | End: 2022-04-21 | Stop reason: SDUPTHER

## 2021-08-12 RX ORDER — METOPROLOL SUCCINATE 50 MG/1
TABLET, EXTENDED RELEASE ORAL
Qty: 135 TABLET | Refills: 3 | Status: SHIPPED | OUTPATIENT
Start: 2021-08-12 | End: 2022-07-22

## 2021-08-12 RX ORDER — TAMSULOSIN HYDROCHLORIDE 0.4 MG/1
CAPSULE ORAL
Qty: 90 CAPSULE | Refills: 5 | Status: SHIPPED | OUTPATIENT
Start: 2021-08-12

## 2021-08-18 ENCOUNTER — TELEPHONE (OUTPATIENT)
Dept: FAMILY MEDICINE CLINIC | Facility: CLINIC | Age: 86
End: 2021-08-18

## 2021-08-18 DIAGNOSIS — H92.09 EARACHE: ICD-10-CM

## 2021-08-19 ENCOUNTER — TELEPHONE (OUTPATIENT)
Dept: OTHER | Facility: OTHER | Age: 86
End: 2021-08-19

## 2021-08-19 NOTE — TELEPHONE ENCOUNTER
Patient has a ear ache, and cannot wear his hearing aid  He is canceling this mornings appointment  Please call back to reschedule

## 2021-09-21 ENCOUNTER — APPOINTMENT (OUTPATIENT)
Dept: LAB | Facility: HOSPITAL | Age: 86
End: 2021-09-21
Attending: INTERNAL MEDICINE
Payer: MEDICARE

## 2021-09-21 DIAGNOSIS — Z00.00 MEDICARE ANNUAL WELLNESS VISIT, SUBSEQUENT: ICD-10-CM

## 2021-09-21 DIAGNOSIS — E78.2 MIXED HYPERLIPIDEMIA: ICD-10-CM

## 2021-09-21 LAB
ALBUMIN SERPL BCP-MCNC: 4.2 G/DL (ref 3.5–5)
ALP SERPL-CCNC: 60 U/L (ref 46–116)
ALT SERPL W P-5'-P-CCNC: 26 U/L (ref 12–78)
ANION GAP SERPL CALCULATED.3IONS-SCNC: 7 MMOL/L (ref 4–13)
AST SERPL W P-5'-P-CCNC: 24 U/L (ref 5–45)
BILIRUB SERPL-MCNC: 0.89 MG/DL (ref 0.2–1)
BUN SERPL-MCNC: 21 MG/DL (ref 5–25)
CALCIUM SERPL-MCNC: 9.3 MG/DL (ref 8.3–10.1)
CHLORIDE SERPL-SCNC: 103 MMOL/L (ref 100–108)
CHOLEST SERPL-MCNC: 174 MG/DL (ref 50–200)
CO2 SERPL-SCNC: 30 MMOL/L (ref 21–32)
CREAT SERPL-MCNC: 1.1 MG/DL (ref 0.6–1.3)
GFR SERPL CREATININE-BSD FRML MDRD: 58 ML/MIN/1.73SQ M
GLUCOSE P FAST SERPL-MCNC: 95 MG/DL (ref 65–99)
HDLC SERPL-MCNC: 45 MG/DL
LDLC SERPL CALC-MCNC: 112 MG/DL (ref 0–100)
NONHDLC SERPL-MCNC: 129 MG/DL
POTASSIUM SERPL-SCNC: 4.6 MMOL/L (ref 3.5–5.3)
PROT SERPL-MCNC: 8 G/DL (ref 6.4–8.2)
SODIUM SERPL-SCNC: 140 MMOL/L (ref 136–145)
TRIGL SERPL-MCNC: 87 MG/DL

## 2021-09-21 PROCEDURE — 80053 COMPREHEN METABOLIC PANEL: CPT

## 2021-09-21 PROCEDURE — 36415 COLL VENOUS BLD VENIPUNCTURE: CPT

## 2021-09-21 PROCEDURE — 80061 LIPID PANEL: CPT

## 2021-09-22 ENCOUNTER — OFFICE VISIT (OUTPATIENT)
Dept: FAMILY MEDICINE CLINIC | Facility: CLINIC | Age: 86
End: 2021-09-22
Payer: MEDICARE

## 2021-09-22 ENCOUNTER — TELEPHONE (OUTPATIENT)
Dept: FAMILY MEDICINE CLINIC | Facility: CLINIC | Age: 86
End: 2021-09-22

## 2021-09-22 DIAGNOSIS — I48.21 PERMANENT ATRIAL FIBRILLATION (HCC): ICD-10-CM

## 2021-09-22 DIAGNOSIS — E78.00 HYPERCHOLESTEROLEMIA: ICD-10-CM

## 2021-09-22 DIAGNOSIS — K59.00 CONSTIPATION, UNSPECIFIED CONSTIPATION TYPE: ICD-10-CM

## 2021-09-22 DIAGNOSIS — L98.9 SKIN LESION: Primary | ICD-10-CM

## 2021-09-22 PROCEDURE — 99214 OFFICE O/P EST MOD 30 MIN: CPT | Performed by: INTERNAL MEDICINE

## 2021-09-22 NOTE — PROGRESS NOTES
Assessment/Plan:         Diagnoses and all orders for this visit:    Skin lesion  -     Ambulatory referral to General Surgery; Future  He prefers local care so referral to Dr Brian Can    Permanent atrial fibrillation [facility])  Pt awaiting r/s appt for cardio I told him to call their office since he has not heard as of yet  Stable on current rx    Hypercholesterolemia  Labs reviewed Limit snacks but overall stable range for his age    Constipation, unspecified constipation type  Stable and using prune juice      Rto 4 months        Patient ID: Irma Gleason is a 80 y o  male  HPI  Pt has skin lesion on left foreram derm to remove in October but he does not want to go to [place] so asking for closer option No pain or bleeding No chest pain or sob Very active with his garden and happywith results No falls His bowels have been stable with prune juice in AM No acute issues other than skin lesion today he did not hear back to r/s cardio appt - that was over 2 months ago       Review of Systems   Constitutional: Negative for activity change, chills and fatigue  HENT: Negative  Respiratory: Negative for cough and shortness of breath  Cardiovascular: Negative for chest pain, palpitations and leg swelling  Gastrointestinal: Negative for abdominal distention and abdominal pain  Genitourinary: Negative for difficulty urinating, flank pain and frequency  Musculoskeletal: Positive for arthralgias  Skin:        Lesion    Neurological: Negative for dizziness, light-headedness and headaches  Psychiatric/Behavioral: Negative for sleep disturbance  The patient is not nervous/anxious          Past Medical History:   Diagnosis Date    Arthritis     Atrial fibrillation (HCC)     Last Assessed:8/10/17    Benign prostatic hyperplasia     Last Assessed:1/28/14    BPH (benign prostatic hyperplasia)     Complete atrioventricular block (HCC)     Diverticulosis     Last Assessed:4/30/13    Hypercholesterolemia     Hypertension     Paroxysmal ventricular tachycardia (HCC)     Last Assessed:17    Prostatitis     Last Assessed:12    Pulmonary artery hypertension (HCC)     mild pulmonary htn    Retention, urine     Right bundle branch block     Last Assessed:13    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     Last [id number]     Past Surgical History:   Procedure Laterality Date    CARDIAC PACEMAKER PLACEMENT      St  Judes DC PPM    COLONOSCOPY      HEMORRHOID SURGERY      Cauterization of hemorrhoids    HERNIA REPAIR      INCISION AND DRAINAGE ABSCESS / HEMATOMA OF BURSA / KNEE / Cadence Hare      Fatty Tumor Removed    KNEE SURGERY Left     Steel Removes from left knee     Social History     Socioeconomic History    Marital status:      Spouse name: Not on file    Number of children: Not on file    Years of education: Not on file    Highest education level: Not on file   Occupational History    Occupation: retired   Tobacco Use    Smoking status: Former Smoker     Quit date:      Years since quittin 7    Smokeless tobacco: Never Used   Vaping Use    Vaping Use: Never used   Substance and Sexual Activity    Alcohol use: Not Currently     Alcohol/week: 0 0 standard drinks     Comment: social drinker    Drug use: No    Sexual activity: Not on file   Other Topics Concern    Not on file   Social History Narrative    Advanced directive discussed with patient    Always uses seatbelt    Always uses sunscreen    Caffeine use- 1 soda on occasion    Regular dental care     Social Determinants of Health     Financial Resource Strain:     Difficulty of Paying Living Expenses:    Food Insecurity:     Worried About Running Out of Food in the Last Year:     [address] in the Last Year:    Transportation Needs:     Lack of Transportation (Medical):      Lack of Transportation (Non-Medical):    Physical Activity:     Days of Exercise per Week:     Minutes of Exercise per Session: Stress:     Feeling of Stress :    Social Connections:     Frequency of Communication with Friends and Family:     Frequency of Social Gatherings with Friends and Family:     Attends Taoist Services:     Active Member of Clubs or Organizations:     Attends Club or Organization Meetings:     Marital Status:    Intimate Partner Violence:     Fear of Current or Ex-Partner:     Emotionally Abused:     Physically Abused:     Sexually Abused: Allergies   Allergen Reactions    Ibuprofen Hives            There were no vitals taken for this visit  Physical Exam  Vitals reviewed  Constitutional:       General: He is not in acute distress  Appearance: Normal appearance  He is not ill-appearing, toxic-appearing or diaphoretic  HENT:      Head: Normocephalic and atraumatic  Right Ear: Tympanic membrane and external ear normal       Left Ear: Tympanic membrane and external ear normal       Nose: Nose normal       Mouth/Throat:      Mouth: Mucous membranes are dry  Eyes:      General: No scleral icterus  Extraocular Movements: Extraocular movements intact  Conjunctiva/sclera: Conjunctivae normal       Pupils: Pupils are equal, round, and reactive to light  Cardiovascular:      Rate and Rhythm: Normal rate  Rhythm irregular  Pulses: Normal pulses  Pulmonary:      Effort: Pulmonary effort is normal  No respiratory distress  Breath sounds: Normal breath sounds  No wheezing  Abdominal:      General: Bowel sounds are normal  There is no distension  Palpations: Abdomen is soft  Tenderness: There is no abdominal tenderness  Musculoskeletal:      Cervical back: Normal range of motion  No rigidity  Right lower leg: No edema  Left lower leg: No edema  Lymphadenopathy:      Cervical: No cervical adenopathy  Skin:     General: Skin is dry  Coloration: Skin is not jaundiced or pale  Findings: No erythema     Neurological:      General: No

## 2021-09-22 NOTE — TELEPHONE ENCOUNTER
Pt is in person stating he has an appt today but it had been canceled back in July, and moved to end of Oct  can you see him today    Harriett Skinner  still reading an old card

## 2021-09-22 NOTE — TELEPHONE ENCOUNTER
Hif he is here can see but he will have to wait until free maybe 930 or 945 time frame Cx October appt then

## 2021-09-29 ENCOUNTER — OFFICE VISIT (OUTPATIENT)
Dept: SURGERY | Facility: CLINIC | Age: 86
End: 2021-09-29
Payer: MEDICARE

## 2021-09-29 VITALS
SYSTOLIC BLOOD PRESSURE: 143 MMHG | TEMPERATURE: 98.5 F | WEIGHT: 198 LBS | DIASTOLIC BLOOD PRESSURE: 52 MMHG | HEART RATE: 64 BPM | HEIGHT: 74 IN | BODY MASS INDEX: 25.41 KG/M2

## 2021-09-29 DIAGNOSIS — L98.9 SKIN LESION: ICD-10-CM

## 2021-09-29 PROCEDURE — 99214 OFFICE O/P EST MOD 30 MIN: CPT | Performed by: SURGERY

## 2021-09-29 NOTE — PROGRESS NOTES
Assessment/Plan:    Skin lesion   80year-old male with history of basal cell carcinoma who presents with  small mass on his left forearm  Plan: Will schedule patient for excision of forearm mass in the office in a couple weeks          Diagnoses and all orders for this visit:    Skin lesion  -     Ambulatory referral to General Surgery          Subjective:      Patient ID: Colin Romero is a 80 y o  male  Skin lesion   26-year-old male with history of basal cell carcinoma who presents with  small mass on his left forearm  Patient states he noticed the mass about 2 months ago  He denies any pain, changes in color or size of the mass  He presented to his PCP on 09/22/2021 and was referred to the office for removal   Patient denies any lymphadenopathy, chest pain, shortness of breath, headaches, changes in his vision, nausea, vomiting  He is tolerating a diet at home and is urinating and defecating without issue  Patient does note some constipation that has been relieved with prune juice       The following portions of the patient's history were reviewed and updated as appropriate:   He  has a past medical history of Arthritis, Atrial fibrillation ([address] ), Benign prostatic hyperplasia, BPH (benign prostatic hyperplasia), Complete atrioventricular block ([address] ), Diverticulosis, Hypercholesterolemia, Hypertension, Paroxysmal ventricular tachycardia ([address] ), Prostatitis, Pulmonary artery hypertension ([address] ), Retention, urine, Right bundle branch block, and 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    He   Patient Active Problem List    Diagnosis Date Noted    Skin lesion 09/22/2021    Platelets decreased ([address] ) 07/22/2021    Constipation 03/22/2021    Permanent atrial fibrillation ([address] ) 04/13/2019    Presence of permanent cardiac pacemaker 04/13/2019    Medicare annual wellness visit, subsequent 03/28/2019    Pulmonary artery hypertension ([address] ) 11/22/2013    RBBB 11/01/2013    Diverticulosis 04/30/2013    Benign prostatic hyperplasia 11/14/2012    Hypercholesterolemia 06/01/2012     He  has a past surgical history that includes Cardiac pacemaker placement; Hemorrhoid surgery; Colonoscopy; Hernia repair; Knee surgery (Left); and Incision and drainage abscess / hematoma of bursa / knee / thigh  His family history includes Diabetes in his family; Hypertension in his mother; Other in his sister; Prostate cancer in his brother; Stroke in his mother  He  reports that he quit smoking about 68 years ago  He has never used smokeless tobacco  He reports previous alcohol use  He reports that he does not use drugs  Current Outpatient Medications   Medication Sig Dispense Refill    Ascorbic Acid (VITAMIN C) 1000 MG tablet Take 1 tablet by mouth daily      aspirin 325 mg tablet Take 1 tablet by mouth daily      Cholecalciferol (VITAMIN D3) 1000 units CAPS Take 1 tablet by mouth daily      cyanocobalamin (VITAMIN B-12) 100 mcg tablet Take 1 tablet by mouth daily      finasteride (PROSCAR) 5 mg tablet TAKE 1 TABLET BY MOUTH EVERY DAY 90 tablet 1    metoprolol succinate (TOPROL-XL) 50 mg 24 hr tablet TAKE 1 5 TABLET DAILY 135 tablet 3    Multiple Vitamins-Minerals (ICAPS AREDS 2 PO) Take by mouth      neomycin-polymyxin-hydrocortisone (CORTISPORIN) otic solution Administer 3 drops to the right ear 2 (two) times a day 10 mL 1    Red Yeast Rice 600 MG TABS Take by mouth daily      tamsulosin (FLOMAX) 0 4 mg TAKE 1 CAPSULE BY MOUTH EVERY DAY 90 capsule 5     No current facility-administered medications for this visit  He is allergic to ibuprofen       ROS:  Negative except as noted above        Objective:      /52   Pulse 64   Temp 98 5 °F (36 9 °C)   Ht 6' 2" (1 88 m)   Wt 89 8 kg (198 lb)   BMI 25 42 kg/m²          Physical Exam  Constitutional:       General: He is not in acute distress  Appearance: He is not ill-appearing or toxic-appearing  HENT:      Head: Normocephalic and atraumatic     Eyes: Extraocular Movements: Extraocular movements intact  Cardiovascular:      Rate and Rhythm: Normal rate  Pulses:           Radial pulses are 2+ on the right side and 2+ on the left side  Pulmonary:      Effort: Pulmonary effort is normal  No respiratory distress  Abdominal:      Comments:      Genitourinary:     Rectum: Guaiac stool:     Musculoskeletal:      Cervical back: Normal range of motion  Lymphadenopathy:      Head:      Right side of head: No submental, submandibular or preauricular adenopathy  Left side of head: No submental, submandibular or preauricular adenopathy  Upper Body:      Right upper body: No supraclavicular or axillary adenopathy  Left upper body: No supraclavicular or axillary adenopathy  Skin:     General: Skin is warm and dry  Neurological:      Mental Status: He is alert and oriented to person, place, and time  Mental status is at baseline  Psychiatric:         Mood and Affect: Mood normal          Behavior: Behavior normal          Thought Content: Thought content normal          Judgment: Judgment normal        Office note from 9/22/21 reviewed personally by me

## 2021-09-29 NOTE — ASSESSMENT & PLAN NOTE
42-year-old male with history of basal cell carcinoma who presents with  small mass on his left forearm  Plan: Will schedule patient for excision of forearm mass in the office in a couple weeks  Travis Teague

## 2022-01-05 ENCOUNTER — OFFICE VISIT (OUTPATIENT)
Dept: FAMILY MEDICINE CLINIC | Facility: CLINIC | Age: 87
End: 2022-01-05
Payer: MEDICARE

## 2022-01-05 VITALS
BODY MASS INDEX: 25.72 KG/M2 | TEMPERATURE: 97.9 F | DIASTOLIC BLOOD PRESSURE: 70 MMHG | RESPIRATION RATE: 18 BRPM | HEIGHT: 74 IN | HEART RATE: 76 BPM | SYSTOLIC BLOOD PRESSURE: 128 MMHG | WEIGHT: 200.4 LBS

## 2022-01-05 DIAGNOSIS — I48.21 PERMANENT ATRIAL FIBRILLATION (HCC): Primary | ICD-10-CM

## 2022-01-05 DIAGNOSIS — I27.21 PULMONARY ARTERY HYPERTENSION (HCC): ICD-10-CM

## 2022-01-05 PROBLEM — L98.9 SKIN LESION: Status: RESOLVED | Noted: 2021-09-22 | Resolved: 2022-01-05

## 2022-01-05 PROCEDURE — 99214 OFFICE O/P EST MOD 30 MIN: CPT | Performed by: INTERNAL MEDICINE

## 2022-01-05 NOTE — PROGRESS NOTES
Assessment/Plan:         Diagnoses and all orders for this visit:    Permanent atrial fibrillation ([facility] [address] )  Pt stable and follows with Cardiology  No sxs reported Stable on current regimen  Fall precautions     Pulmonary artery hypertension ([facility] [address] )  Pt managed by Cardiology and remains asxs on current regimen    Diverticulosis  He has a prep for bowels as needed and stable Hi fiber diet Increase water intake    He only drives locally and not at night and not as much these days Mostly to doctors visits              Patient ID: Nigel Suh is a 80 y o  male  HPI   Pt needs form for license renewal He drives less mainly to doctors or the store No night driving No falls he is active and manages his home No chest pain, sob or other sxs He does not go out much with covid and his daughter preps meals for him His bowels are better and he uses prune juice prn with relief        Review of Systems   Constitutional: Negative for activity change, chills and fever  HENT: Positive for congestion  Eyes: Negative for visual disturbance  Respiratory: Negative for cough and shortness of breath  Cardiovascular: Negative for chest pain, palpitations and leg swelling  Gastrointestinal: Negative for abdominal distention, abdominal pain and constipation  Genitourinary: Positive for frequency  Musculoskeletal: Positive for arthralgias  Neurological: Negative for dizziness, light-headedness and headaches  Psychiatric/Behavioral: Negative for sleep disturbance  The patient is not nervous/anxious          Past Medical History:   Diagnosis Date    Arthritis     Atrial fibrillation (HCC)     Last Assessed:8/10/17    Benign prostatic hyperplasia     Last Assessed:1/28/14    BPH (benign prostatic hyperplasia)     Complete atrioventricular block (HCC)     Diverticulosis     Last Assessed:4/30/13    Hypercholesterolemia     Hypertension     Paroxysmal ventricular tachycardia (HCC)     Last Assessed:7/20/17    Prostatitis Last Assessed:12    Pulmonary artery hypertension (HCC)     mild pulmonary htn    Retention, urine     Right bundle branch block     Last Assessed:13    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     Last [id number]:     Past Surgical History:   Procedure Laterality Date    CARDIAC PACEMAKER PLACEMENT      [facility]  [employer] DC PPM    COLONOSCOPY      HEMORRHOID SURGERY      Cauterization of hemorrhoids    HERNIA REPAIR      INCISION AND DRAINAGE ABSCESS / HEMATOMA OF BURSA / KNEE / THIGH      Fatty Tumor Removed    KNEE SURGERY Left     Steel Removes from left knee     Social History     Socioeconomic History    Marital status:      Spouse name: Not on file    Number of children: Not on file    Years of education: Not on file    Highest education level: Not on file   Occupational History    Occupation: retired   Tobacco Use    Smoking status: Former Smoker     Quit date:      Years since quittin 0    Smokeless tobacco: Never Used   Vaping Use    Vaping Use: Never used   Substance and Sexual Activity    Alcohol use: Not Currently     Alcohol/week: 0 0 standard drinks     Comment: social drinker    Drug use: No    Sexual activity: Not on file   Other Topics Concern    Not on file   Social History Narrative    Advanced directive discussed with patient    Always uses seatbelt    Always uses sunscreen    Caffeine use- 1 soda on occasion    Regular dental care     Social Determinants of Health     Financial Resource Strain: Not on file   Food Insecurity: Not on file   Transportation Needs: Not on file   Physical Activity: Not on file   Stress: Not on file   Social Connections: Not on file   Intimate Partner Violence: Not on file   Housing Stability: Not on file     Allergies   Allergen Reactions    Ibuprofen Hives     BMI Counseling: Body mass index is 25 73 kg/m²   The BMI is above normal  Nutrition recommendations include consuming healthier snacks, moderation in carbohydrate intake and increasing intake of lean protein  Exercise recommendations include exercising 3-5 times per week  Rationale for BMI follow-up plan is due to patient being overweight or obese  /70   Pulse 76   Temp 97 9 °F (36 6 °C) (Temporal)   Resp 18   Ht 6' 2" (1 88 m)   Wt 90 9 kg (200 lb 6 4 oz)   BMI 25 73 kg/m²          Physical Exam  Vitals reviewed  Constitutional:       General: He is not in acute distress  Appearance: Normal appearance  He is not ill-appearing, toxic-appearing or diaphoretic  HENT:      Head: Normocephalic and atraumatic  Right Ear: Tympanic membrane, ear canal and external ear normal  There is no impacted cerumen  Left Ear: Tympanic membrane, ear canal and external ear normal  There is no impacted cerumen  Nose: Nose normal       Mouth/Throat:      Mouth: Mucous membranes are dry  Eyes:      General: No scleral icterus  Extraocular Movements: Extraocular movements intact  Conjunctiva/sclera: Conjunctivae normal       Pupils: Pupils are equal, round, and reactive to light  Cardiovascular:      Rate and Rhythm: Normal rate  Rhythm irregular  Pulses: Normal pulses  Heart sounds: Normal heart sounds  Pulmonary:      Effort: Pulmonary effort is normal  No respiratory distress  Breath sounds: Normal breath sounds  No wheezing  Abdominal:      General: Bowel sounds are normal  There is no distension  Palpations: Abdomen is soft  Tenderness: There is no abdominal tenderness  Musculoskeletal:      Cervical back: Normal range of motion and neck supple  No rigidity  Right lower leg: No edema  Left lower leg: No edema  Lymphadenopathy:      Cervical: No cervical adenopathy  Skin:     General: Skin is dry  Coloration: Skin is not jaundiced or pale  Neurological:      General: No focal deficit present  Mental Status: He is alert and oriented to person, place, and time   Mental status is at baseline  Cranial Nerves: No cranial nerve deficit  Motor: No weakness  Psychiatric:         Mood and Affect: Mood normal          Behavior: Behavior normal          Thought Content:  Thought content normal          Judgment: Judgment normal

## 2022-02-24 ENCOUNTER — OFFICE VISIT (OUTPATIENT)
Dept: CARDIOLOGY CLINIC | Facility: HOSPITAL | Age: 87
End: 2022-02-24
Payer: MEDICARE

## 2022-02-24 ENCOUNTER — IN-CLINIC DEVICE VISIT (OUTPATIENT)
Dept: CARDIOLOGY CLINIC | Facility: HOSPITAL | Age: 87
End: 2022-02-24
Payer: MEDICARE

## 2022-02-24 VITALS
WEIGHT: 201.8 LBS | HEIGHT: 74 IN | SYSTOLIC BLOOD PRESSURE: 134 MMHG | DIASTOLIC BLOOD PRESSURE: 66 MMHG | HEART RATE: 64 BPM | BODY MASS INDEX: 25.9 KG/M2

## 2022-02-24 DIAGNOSIS — Z95.0 PRESENCE OF PERMANENT CARDIAC PACEMAKER: ICD-10-CM

## 2022-02-24 DIAGNOSIS — I48.21 PERMANENT ATRIAL FIBRILLATION (HCC): Primary | ICD-10-CM

## 2022-02-24 DIAGNOSIS — E78.00 HYPERCHOLESTEROLEMIA: ICD-10-CM

## 2022-02-24 DIAGNOSIS — Z95.0 PRESENCE OF CARDIAC PACEMAKER: Primary | ICD-10-CM

## 2022-02-24 PROCEDURE — 93279 PRGRMG DEV EVAL PM/LDLS PM: CPT | Performed by: INTERNAL MEDICINE

## 2022-02-24 PROCEDURE — 99214 OFFICE O/P EST MOD 30 MIN: CPT | Performed by: INTERNAL MEDICINE

## 2022-02-24 NOTE — PROGRESS NOTES
Results for orders placed or performed in visit on 02/24/22   Cardiac EP device report    Narrative    SJM-DUAL CHAMBER PPM (VVIR 60)/ NOT MRI CONDITIONAL  DEVICE INTERROGATED IN THE [facility] OFFICE: BATTERY VOLTAGE ADEQUATE (4 1 YRS)  : 96% (>40%~CHB)  ALL AVAILABLE LEAD PARAMETERS WITHIN NORMAL LIMITS  NO SIGNIFICANT HIGH RATE EPISODES  NO PROGRAMMING CHANGES MADE TO DEVICE PARAMETERS  PACEMAKER FUNCTIONING APPROPRIATELY    [address] [place] [address]

## 2022-02-24 NOTE — PROGRESS NOTES
Cardiology Follow Up    Vick King  5/14/1928  [address] [facility] [place]  [address] [facility] [address]  [phone]    1  Permanent atrial fibrillation ([address] )     2  Hypercholesterolemia     3  Presence of permanent cardiac pacemaker         Diagnoses and all orders for this visit:    Permanent atrial fibrillation ([address] )    Hypercholesterolemia    Presence of permanent cardiac pacemaker      I had the pleasure of seeing Vick King for a follow up visit  Interval History:  None, physically active without symptoms    History of the presenting illness, Discussion/Summary and My Plan are as follows:::    He is a pleasant 80 yr old male with a permanent pacemaker ([address] PPM)-  implantation in October 2013 for sinus rhythm with complete heart block  (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)  His pacemaker interrogation revealed episodes of rate controlled paroxysmal atrial fibrillation and subsequently Non-sustained Ventricular Tachycardia that subsided on beta-blockade  I also initiated him on a full dose aspirin a day  His chads 2 score is one for age  His CHADSVASC score would be 2  He is functional gentleman leading an active life and might benefit from more aggressive anticoagulation with Coumadin or one of the newer agents  This was discussed with him in detail at every visit and today  However, he would like to take ASA alone  He also discontinued his simvastatin and is now taking Red Yeast Rice       Remains active without symptoms  He did not want to do an EKG this visit and the last since he feels well      Plan:     Status post dual-chamber pacemaker - [facility]  [employer] device: Last interrogation with normal function - March and June 2021, due for another 1 now, No recent high rate episodes  Episodes of AFib with heart rate control had been good   Remains very asymptomatic at very good levels of exertion      Atrial fibrillation: Continue metoprolol  and high-dose aspirin  Does not want any further aggressive anticoagulation either with Coumadin or any of the newer anticoagulants    Discussed with him  Rates have been controlled      Dyslipidemia:  Acceptable, no changes, considering age He was on simvastatin-he changed this to red yeast rice  History of Non-sustained Ventricular tachycardia: Neg echo and stress test for ischemia  Increased Metoprolol to 75 mg a day, Remains completely asymptomatic  No recent episodes on most recent Pacemaker interrogations     I will see him in followup in 12 months  Results for Jenifer Goff (MRN [id number]) as of 2/24/2022 14:20   Ref   Range 9/21/2021 07:48   Cholesterol Latest Ref Range: 50 - 200 mg/dL 174   Triglycerides Latest Ref Range: <=150 mg/dL 87   HDL Latest Ref Range: >=40 mg/dL 45   Non-HDL Cholesterol Latest Units: mg/dl 129   LDL Calculated Latest Ref Range: 0 - 100 mg/dL 112 (H)           Patient Active Problem List   Diagnosis    Benign prostatic hyperplasia    Diverticulosis    Hypercholesterolemia    Pulmonary artery hypertension (HCC)    RBBB    Medicare annual wellness visit, subsequent    Permanent atrial fibrillation ([facility] [address] )    Presence of permanent cardiac pacemaker    Constipation    Platelets decreased ([facility] [address] )     Past Medical History:   Diagnosis Date    Arthritis     Atrial fibrillation (HCC)     Last Assessed:8/10/17    Benign prostatic hyperplasia     Last Assessed:1/28/14    BPH (benign prostatic hyperplasia)     Complete atrioventricular block (HCC)     Diverticulosis     Last Assessed:4/30/13    Hypercholesterolemia     Hypertension     Paroxysmal ventricular tachycardia (HCC)     Last Assessed:7/20/17    Prostatitis     Last Assessed:12/19/12    Pulmonary artery hypertension (HCC)     mild pulmonary htn    Retention, urine     Right bundle branch block     Last Assessed:13    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     Last BIPUUKLKM:75/0/46     Social History     Socioeconomic History    Marital status:       Spouse name: Not on file    Number of children: Not on file    Years of education: Not on file    Highest education level: Not on file   Occupational History    Occupation: retired   Tobacco Use    Smoking status: Former Smoker     Quit date:      Years since quittin 1    Smokeless tobacco: Never Used   Vaping Use    Vaping Use: Never used   Substance and Sexual Activity    Alcohol use: Not Currently     Alcohol/week: 0 0 standard drinks     Comment: social drinker    Drug use: No    Sexual activity: Not on file   Other Topics Concern    Not on file   Social History Narrative    Advanced directive discussed with patient    Always uses seatbelt    Always uses sunscreen    Caffeine use- 1 soda on occasion    Regular dental care     Social Determinants of Health     Financial Resource Strain: Not on file   Food Insecurity: Not on file   Transportation Needs: Not on file   Physical Activity: Not on file   Stress: Not on file   Social Connections: Not on file   Intimate Partner Violence: Not on file   Housing Stability: Not on file      Family History   Problem Relation Age of Onset    Hypertension Mother     Stroke Mother         Stroke Syndrome    Other Sister         pacemaker placement    Prostate cancer Brother     Diabetes Family      Past Surgical History:   Procedure Laterality Date    CARDIAC PACEMAKER PLACEMENT      St  [employer] DC PPM    COLONOSCOPY      HEMORRHOID SURGERY      Cauterization of hemorrhoids    HERNIA REPAIR      INCISION AND DRAINAGE ABSCESS / [address] / [place] / [address]      Fatty Tumor Removed   [address] Removes from left knee       Current Outpatient Medications:     Ascorbic Acid (VITAMIN C) 1000 MG tablet, Take 1 tablet by mouth daily, Disp: , Rfl:     aspirin 325 mg tablet, Take 1 tablet by mouth daily, Disp: , Rfl:     Cholecalciferol (VITAMIN D3) 1000 units CAPS, Take 1 tablet by mouth daily, Disp: , Rfl:     cyanocobalamin (VITAMIN B-12) 100 mcg tablet, Take 1 tablet by mouth daily, Disp: , Rfl:     finasteride (PROSCAR) 5 mg tablet, TAKE 1 TABLET BY MOUTH EVERY DAY, Disp: 90 tablet, Rfl: 1    metoprolol succinate (TOPROL-XL) 50 mg 24 hr tablet, TAKE 1 5 TABLET DAILY, Disp: 135 tablet, Rfl: 3    Red Yeast Rice 600 MG TABS, Take by mouth daily, Disp: , Rfl:     tamsulosin (FLOMAX) 0 4 mg, TAKE 1 CAPSULE BY MOUTH EVERY DAY, Disp: 90 capsule, Rfl: 5    Multiple Vitamins-Minerals (ICAPS AREDS 2 PO), Take by mouth (Patient not taking: Reported on 2/24/2022 ), Disp: , Rfl:     neomycin-polymyxin-hydrocortisone (CORTISPORIN) otic solution, Administer 3 drops to the right ear 2 (two) times a day (Patient not taking: Reported on 2/24/2022 ), Disp: 10 mL, Rfl: 1  Allergies   Allergen Reactions    Ibuprofen Hives       Imaging: No results found  Review of Systems:  Review of Systems   Constitutional: Negative  HENT: Negative  Eyes: Negative  Respiratory: Negative  Cardiovascular: Negative  Endocrine: Negative  Musculoskeletal: Negative  Neurological: Negative  Hematological: Negative  Physical Exam:  /66   Pulse 64   Ht 6' 2" (1 88 m)   Wt 91 5 kg (201 lb 12 8 oz)   BMI 25 91 kg/m²   Physical Exam  Constitutional:       General: He is not in acute distress  Appearance: He is well-developed  He is not diaphoretic  HENT:      Head: Normocephalic and atraumatic  Eyes:      General: No scleral icterus  Right eye: No discharge  Left eye: No discharge  Conjunctiva/sclera: Conjunctivae normal       Pupils: Pupils are equal, round, and reactive to light  Neck:      Thyroid: No thyromegaly  Vascular: No JVD  Trachea: No tracheal deviation  Cardiovascular:      Rate and Rhythm: Normal rate and regular rhythm  Heart sounds: No murmur heard  No friction rub  Pulmonary:      Effort: Pulmonary effort is normal  No respiratory distress  Breath sounds: Normal breath sounds  No stridor  Abdominal:      General: Bowel sounds are normal  There is no distension  Palpations: Abdomen is soft  Tenderness: There is no abdominal tenderness  There is no guarding  Musculoskeletal:         General: No tenderness or deformity  Normal range of motion  Cervical back: Normal range of motion  Skin:     General: Skin is warm  Coloration: Skin is not pale  Findings: No erythema or rash

## 2022-04-10 ENCOUNTER — APPOINTMENT (EMERGENCY)
Dept: CT IMAGING | Facility: HOSPITAL | Age: 87
DRG: 871 | End: 2022-04-10
Payer: MEDICARE

## 2022-04-10 ENCOUNTER — HOSPITAL ENCOUNTER (INPATIENT)
Facility: HOSPITAL | Age: 87
LOS: 4 days | Discharge: HOME HEALTH | DRG: 871 | End: 2022-04-14
Attending: EMERGENCY MEDICINE | Admitting: INTERNAL MEDICINE
Payer: MEDICARE

## 2022-04-10 ENCOUNTER — APPOINTMENT (EMERGENCY)
Dept: RADIOLOGY | Facility: HOSPITAL | Age: 87
DRG: 871 | End: 2022-04-10
Payer: MEDICARE

## 2022-04-10 DIAGNOSIS — R45.851 SUICIDAL IDEATION: ICD-10-CM

## 2022-04-10 DIAGNOSIS — K57.90 DIVERTICULOSIS: ICD-10-CM

## 2022-04-10 DIAGNOSIS — K59.00 CONSTIPATION, UNSPECIFIED CONSTIPATION TYPE: ICD-10-CM

## 2022-04-10 DIAGNOSIS — J18.9 PNEUMONIA: ICD-10-CM

## 2022-04-10 DIAGNOSIS — A41.9 SEPSIS (HCC): Primary | ICD-10-CM

## 2022-04-10 DIAGNOSIS — I48.21 PERMANENT ATRIAL FIBRILLATION (HCC): ICD-10-CM

## 2022-04-10 DIAGNOSIS — E78.00 HYPERCHOLESTEROLEMIA: ICD-10-CM

## 2022-04-10 DIAGNOSIS — J18.9 PNEUMONIA OF LEFT LOWER LOBE DUE TO INFECTIOUS ORGANISM: ICD-10-CM

## 2022-04-10 PROBLEM — E87.1 HYPONATREMIA: Status: ACTIVE | Noted: 2022-04-10

## 2022-04-10 LAB
2HR DELTA HS TROPONIN: 7 NG/L
4HR DELTA HS TROPONIN: 6 NG/L
ALBUMIN SERPL BCP-MCNC: 3.5 G/DL (ref 3.5–5)
ALP SERPL-CCNC: 62 U/L (ref 46–116)
ALT SERPL W P-5'-P-CCNC: 24 U/L (ref 12–78)
ANION GAP SERPL CALCULATED.3IONS-SCNC: 11 MMOL/L (ref 4–13)
ANISOCYTOSIS BLD QL SMEAR: PRESENT
APTT PPP: 48 SECONDS (ref 23–37)
AST SERPL W P-5'-P-CCNC: 21 U/L (ref 5–45)
BASE EX.OXY STD BLDV CALC-SCNC: 56 % (ref 60–80)
BASE EXCESS BLDV CALC-SCNC: -1.4 MMOL/L
BASOPHILS # BLD MANUAL: 0 THOUSAND/UL (ref 0–0.1)
BASOPHILS NFR MAR MANUAL: 0 % (ref 0–1)
BILIRUB SERPL-MCNC: 2.15 MG/DL (ref 0.2–1)
BUN SERPL-MCNC: 32 MG/DL (ref 5–25)
CALCIUM SERPL-MCNC: 8.9 MG/DL (ref 8.3–10.1)
CARDIAC TROPONIN I PNL SERPL HS: 27 NG/L
CARDIAC TROPONIN I PNL SERPL HS: 33 NG/L
CARDIAC TROPONIN I PNL SERPL HS: 34 NG/L
CHLORIDE SERPL-SCNC: 97 MMOL/L (ref 100–108)
CO2 SERPL-SCNC: 24 MMOL/L (ref 21–32)
CREAT SERPL-MCNC: 1.28 MG/DL (ref 0.6–1.3)
EOSINOPHIL # BLD MANUAL: 0 THOUSAND/UL (ref 0–0.4)
EOSINOPHIL NFR BLD MANUAL: 0 % (ref 0–6)
ERYTHROCYTE [DISTWIDTH] IN BLOOD BY AUTOMATED COUNT: 13.8 % (ref 11.6–15.1)
FLUAV RNA RESP QL NAA+PROBE: NEGATIVE
FLUBV RNA RESP QL NAA+PROBE: NEGATIVE
GFR SERPL CREATININE-BSD FRML MDRD: 47 ML/MIN/1.73SQ M
GLUCOSE SERPL-MCNC: 140 MG/DL (ref 65–140)
HCO3 BLDV-SCNC: 24 MMOL/L (ref 24–30)
HCT VFR BLD AUTO: 37.4 % (ref 36.5–49.3)
HGB BLD-MCNC: 12.4 G/DL (ref 12–17)
INR PPP: 1.42 (ref 0.84–1.19)
LACTATE SERPL-SCNC: 1.4 MMOL/L (ref 0.5–2)
LACTATE SERPL-SCNC: 2.2 MMOL/L (ref 0.5–2)
LIPASE SERPL-CCNC: 43 U/L (ref 73–393)
LYMPHOCYTES # BLD AUTO: 0.25 THOUSAND/UL (ref 0.6–4.47)
LYMPHOCYTES # BLD AUTO: 1 % (ref 14–44)
MAGNESIUM SERPL-MCNC: 2.2 MG/DL (ref 1.6–2.6)
MCH RBC QN AUTO: 32.1 PG (ref 26.8–34.3)
MCHC RBC AUTO-ENTMCNC: 33.2 G/DL (ref 31.4–37.4)
MCV RBC AUTO: 97 FL (ref 82–98)
MONOCYTES # BLD AUTO: 1.24 THOUSAND/UL (ref 0–1.22)
MONOCYTES NFR BLD: 5 % (ref 4–12)
NEUTROPHILS # BLD MANUAL: 23.32 THOUSAND/UL (ref 1.85–7.62)
NEUTS BAND NFR BLD MANUAL: 7 % (ref 0–8)
NEUTS SEG NFR BLD AUTO: 87 % (ref 43–75)
O2 CT BLDV-SCNC: 9.8 ML/DL
PCO2 BLDV: 42.7 MM HG (ref 42–50)
PH BLDV: 7.37 [PH] (ref 7.3–7.4)
PLATELET # BLD AUTO: 122 THOUSANDS/UL (ref 149–390)
PLATELET # BLD AUTO: 148 THOUSANDS/UL (ref 149–390)
PLATELET BLD QL SMEAR: ABNORMAL
PMV BLD AUTO: 10.7 FL (ref 8.9–12.7)
PMV BLD AUTO: 11.1 FL (ref 8.9–12.7)
PO2 BLDV: 30 MM HG (ref 35–45)
POTASSIUM SERPL-SCNC: 4.1 MMOL/L (ref 3.5–5.3)
PROCALCITONIN SERPL-MCNC: 0.37 NG/ML
PROT SERPL-MCNC: 7.8 G/DL (ref 6.4–8.2)
PROTHROMBIN TIME: 16.6 SECONDS (ref 11.6–14.5)
RBC # BLD AUTO: 3.86 MILLION/UL (ref 3.88–5.62)
RSV RNA RESP QL NAA+PROBE: NEGATIVE
SARS-COV-2 RNA RESP QL NAA+PROBE: NEGATIVE
SODIUM SERPL-SCNC: 132 MMOL/L (ref 136–145)
WBC # BLD AUTO: 24.81 THOUSAND/UL (ref 4.31–10.16)

## 2022-04-10 PROCEDURE — 85049 AUTOMATED PLATELET COUNT: CPT

## 2022-04-10 PROCEDURE — 83690 ASSAY OF LIPASE: CPT | Performed by: EMERGENCY MEDICINE

## 2022-04-10 PROCEDURE — 83605 ASSAY OF LACTIC ACID: CPT | Performed by: EMERGENCY MEDICINE

## 2022-04-10 PROCEDURE — 84484 ASSAY OF TROPONIN QUANT: CPT

## 2022-04-10 PROCEDURE — 85610 PROTHROMBIN TIME: CPT | Performed by: EMERGENCY MEDICINE

## 2022-04-10 PROCEDURE — 87040 BLOOD CULTURE FOR BACTERIA: CPT | Performed by: EMERGENCY MEDICINE

## 2022-04-10 PROCEDURE — 96361 HYDRATE IV INFUSION ADD-ON: CPT

## 2022-04-10 PROCEDURE — 99223 1ST HOSP IP/OBS HIGH 75: CPT

## 2022-04-10 PROCEDURE — 83605 ASSAY OF LACTIC ACID: CPT

## 2022-04-10 PROCEDURE — 74177 CT ABD & PELVIS W/CONTRAST: CPT

## 2022-04-10 PROCEDURE — 85027 COMPLETE CBC AUTOMATED: CPT | Performed by: EMERGENCY MEDICINE

## 2022-04-10 PROCEDURE — 0241U HB NFCT DS VIR RESP RNA 4 TRGT: CPT | Performed by: EMERGENCY MEDICINE

## 2022-04-10 PROCEDURE — 85007 BL SMEAR W/DIFF WBC COUNT: CPT | Performed by: EMERGENCY MEDICINE

## 2022-04-10 PROCEDURE — 71045 X-RAY EXAM CHEST 1 VIEW: CPT

## 2022-04-10 PROCEDURE — 84145 PROCALCITONIN (PCT): CPT | Performed by: EMERGENCY MEDICINE

## 2022-04-10 PROCEDURE — 82805 BLOOD GASES W/O2 SATURATION: CPT

## 2022-04-10 PROCEDURE — 83735 ASSAY OF MAGNESIUM: CPT | Performed by: EMERGENCY MEDICINE

## 2022-04-10 PROCEDURE — 93005 ELECTROCARDIOGRAM TRACING: CPT

## 2022-04-10 PROCEDURE — 84484 ASSAY OF TROPONIN QUANT: CPT | Performed by: EMERGENCY MEDICINE

## 2022-04-10 PROCEDURE — 85730 THROMBOPLASTIN TIME PARTIAL: CPT | Performed by: EMERGENCY MEDICINE

## 2022-04-10 PROCEDURE — 99285 EMERGENCY DEPT VISIT HI MDM: CPT

## 2022-04-10 PROCEDURE — 87449 NOS EACH ORGANISM AG IA: CPT

## 2022-04-10 PROCEDURE — 80053 COMPREHEN METABOLIC PANEL: CPT | Performed by: EMERGENCY MEDICINE

## 2022-04-10 PROCEDURE — 36415 COLL VENOUS BLD VENIPUNCTURE: CPT | Performed by: EMERGENCY MEDICINE

## 2022-04-10 PROCEDURE — 99285 EMERGENCY DEPT VISIT HI MDM: CPT | Performed by: EMERGENCY MEDICINE

## 2022-04-10 PROCEDURE — 94760 N-INVAS EAR/PLS OXIMETRY 1: CPT

## 2022-04-10 PROCEDURE — 96374 THER/PROPH/DIAG INJ IV PUSH: CPT

## 2022-04-10 RX ORDER — MECLIZINE HCL 12.5 MG/1
25 TABLET ORAL ONCE
Status: COMPLETED | OUTPATIENT
Start: 2022-04-10 | End: 2022-04-10

## 2022-04-10 RX ORDER — TAMSULOSIN HYDROCHLORIDE 0.4 MG/1
0.4 CAPSULE ORAL DAILY
Status: DISCONTINUED | OUTPATIENT
Start: 2022-04-11 | End: 2022-04-14 | Stop reason: HOSPADM

## 2022-04-10 RX ORDER — GUAIFENESIN 600 MG
600 TABLET, EXTENDED RELEASE 12 HR ORAL 2 TIMES DAILY
Status: DISCONTINUED | OUTPATIENT
Start: 2022-04-10 | End: 2022-04-14 | Stop reason: HOSPADM

## 2022-04-10 RX ORDER — MELATONIN
1000 DAILY
Status: DISCONTINUED | OUTPATIENT
Start: 2022-04-11 | End: 2022-04-14 | Stop reason: HOSPADM

## 2022-04-10 RX ORDER — ONDANSETRON 2 MG/ML
4 INJECTION INTRAMUSCULAR; INTRAVENOUS EVERY 6 HOURS PRN
Status: DISCONTINUED | OUTPATIENT
Start: 2022-04-10 | End: 2022-04-14 | Stop reason: HOSPADM

## 2022-04-10 RX ORDER — CEFTRIAXONE 1 G/50ML
1000 INJECTION, SOLUTION INTRAVENOUS ONCE
Status: COMPLETED | OUTPATIENT
Start: 2022-04-10 | End: 2022-04-10

## 2022-04-10 RX ORDER — FINASTERIDE 5 MG/1
5 TABLET, FILM COATED ORAL DAILY
Status: DISCONTINUED | OUTPATIENT
Start: 2022-04-11 | End: 2022-04-14 | Stop reason: HOSPADM

## 2022-04-10 RX ORDER — ONDANSETRON 2 MG/ML
4 INJECTION INTRAMUSCULAR; INTRAVENOUS ONCE
Status: COMPLETED | OUTPATIENT
Start: 2022-04-10 | End: 2022-04-10

## 2022-04-10 RX ORDER — CEFTRIAXONE 1 G/50ML
1000 INJECTION, SOLUTION INTRAVENOUS EVERY 24 HOURS
Status: DISCONTINUED | OUTPATIENT
Start: 2022-04-11 | End: 2022-04-14

## 2022-04-10 RX ORDER — UBIDECARENONE 50 MG
CAPSULE ORAL DAILY
Status: DISCONTINUED | OUTPATIENT
Start: 2022-04-11 | End: 2022-04-11

## 2022-04-10 RX ORDER — ASPIRIN 325 MG
325 TABLET ORAL DAILY
Status: DISCONTINUED | OUTPATIENT
Start: 2022-04-11 | End: 2022-04-14 | Stop reason: HOSPADM

## 2022-04-10 RX ORDER — SODIUM CHLORIDE, SODIUM GLUCONATE, SODIUM ACETATE, POTASSIUM CHLORIDE, MAGNESIUM CHLORIDE, SODIUM PHOSPHATE, DIBASIC, AND POTASSIUM PHOSPHATE .53; .5; .37; .037; .03; .012; .00082 G/100ML; G/100ML; G/100ML; G/100ML; G/100ML; G/100ML; G/100ML
75 INJECTION, SOLUTION INTRAVENOUS CONTINUOUS
Status: DISCONTINUED | OUTPATIENT
Start: 2022-04-10 | End: 2022-04-11

## 2022-04-10 RX ADMIN — IOHEXOL 100 ML: 350 INJECTION, SOLUTION INTRAVENOUS at 19:10

## 2022-04-10 RX ADMIN — ONDANSETRON 4 MG: 2 INJECTION INTRAMUSCULAR; INTRAVENOUS at 18:28

## 2022-04-10 RX ADMIN — CEFTRIAXONE 1000 MG: 1 INJECTION, SOLUTION INTRAVENOUS at 20:04

## 2022-04-10 RX ADMIN — MECLIZINE 25 MG: 12.5 TABLET ORAL at 18:30

## 2022-04-10 RX ADMIN — SODIUM CHLORIDE 1000 ML: 0.9 INJECTION, SOLUTION INTRAVENOUS at 18:35

## 2022-04-10 RX ADMIN — GUAIFENESIN 600 MG: 600 TABLET ORAL at 21:00

## 2022-04-10 RX ADMIN — SODIUM CHLORIDE, SODIUM GLUCONATE, SODIUM ACETATE, POTASSIUM CHLORIDE, MAGNESIUM CHLORIDE, SODIUM PHOSPHATE, DIBASIC, AND POTASSIUM PHOSPHATE 75 ML/HR: .53; .5; .37; .037; .03; .012; .00082 INJECTION, SOLUTION INTRAVENOUS at 21:00

## 2022-04-10 NOTE — ED PROVIDER NOTES
Final Diagnosis:  1  Sepsis ([address] )    2  Pneumonia        Chief Complaint   Patient presents with    Nausea     Since friday, denies vomiting  Taking in water, no solids   Weakness - Generalized     HPI  Patient presents for evaluation of weakness, nausea, generalized malaise  Patient is hard hearing making a detailed history difficult  He states that for the past couple days he has felt unwell and lightheaded  States that he has fallen around his house and has difficulty getting up under his own power but denies any head strike or loss of consciousness  States that he has been able to drink some but has had no appetite  Denies any fever chills, cough, congestion  No apparent episodes of vomiting  Patient states that he is otherwise well and follows with cardiology for his pacemaker but does not have other ongoing medical concerns  No known sick contacts  Patient is accompanied by his daughter who is bedside  She states that he only recently told her that he has been feeling like this for at least the last 2 days  She states that he is usually very healthy and does not often get sick  Unless otherwise specified:  - No language barrier    - History obtained from patient  - There are no limitations to the history obtained  - Previous charting was reviewed    PMH:   has a past medical history of Arthritis, Atrial fibrillation ([address] ), Benign prostatic hyperplasia, BPH (benign prostatic hyperplasia), Complete atrioventricular block ([address] ), Diverticulosis, Hypercholesterolemia, Hypertension, Paroxysmal ventricular tachycardia ([address] ), Prostatitis, Pulmonary artery hypertension ([address] ), Retention, urine, Right bundle branch block, and 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  PSH:   has a past surgical history that includes Cardiac pacemaker placement; Hemorrhoid surgery; Colonoscopy; Hernia repair; Knee surgery (Left); and Incision and drainage abscess / hematoma of bursa / knee / thigh  ROS:  Review of Systems   -   - 13 point ROS was performed and all are normal unless stated in the history above  - Nursing note reviewed  Vitals reviewed  - Orders placed by myself and/or advanced practitioner / resident  PE:   Vitals:    04/10/22 1802 04/10/22 1806   BP:  152/67   Pulse: 59    Resp: 18    Temp: 98 4 °F (36 9 °C)    TempSrc: Temporal    SpO2: 92%    Weight: 87 3 kg (192 lb 7 4 oz)      Vitals reviewed by me  Patient appears nondistressed sitting in bed  No conversational dyspnea  Patient placed on supplemental oxygen as his initial pulse ox was low  Abdomen is soft diffuse palpation  No CVA tenderness  No rebound or guarding  Unless otherwise specified above:    General: VS reviewed  Appears in NAD    Head: Normocephalic, atraumatic  Eyes: EOM-I  No exudate  ENT: Atraumatic external nose and ears  No malocclusion  No stridor  No drooling  Neck: No JVD  CV: No pallor noted  Lungs:   No tachypnea  No respiratory distress    Abdomen:  Soft, non-tender, non-distended    MSK:   FROM spontaneously  No obvious deformity    Skin: Dry, intact  No obvious rash  Neuro: Awake, alert, GCS15, CN II-XII grossly intact  Speaking in full sentences  Motor grossly intact  Psychiatric/Behavioral: Appropriate mood and affect   Exam: deferred    Physical Exam     Procedures   A:  - Nursing note reviewed  ED Course as of 04/10/22 2001   Sun Apr 10, 2022   1857 WBC(!): 24 81   1909 LACTIC ACID(!!): 2 2   1911 Procalcitonin(!): 0 37   [address] daughter, [place], [phone] CT abdomen pelvis with contrast  Pending interpretation     CT abdomen pelvis with contrast   Final Result      1  No acute gastrointestinal findings  2   There is partially visualized dense masslike consolidation in the left lower lobe posteriorly suspicious for pneumonia  Recommend follow-up chest CT in 4-6 weeks to ensure resolution     3   There are a few hyperdense nodular foci in the liver, not definitely seen on the prior exam   While these could represent small flash filling hemangiomas, further evaluation is recommended with dedicated MRI with and without IV contrast if feasible    vs triple phase CT of the liver  The study was marked in [facility] for immediate notification  Workstation performed: [id number]         XR chest 1 view portable   ED Interpretation   Possible consolidation left lower lung field        Orders Placed This Encounter   Procedures    COVID/FLU/RSV - 2 hour TAT    Blood culture #1    Blood culture #2    XR chest 1 view portable    CT abdomen pelvis with contrast    CBC and differential    Protime-INR    APTT    Comprehensive metabolic panel    Magnesium    Lipase    Lactic acid    HS Troponin 0hr (reflex protocol)    Procalcitonin    HS Troponin I 2hr    HS Troponin I 4hr    Lactic acid 2 Hours    Insert peripheral IV    Inpatient Admission     Labs Reviewed   CBC AND DIFFERENTIAL - Abnormal       Result Value Ref Range Status    WBC 24 81 (*) 4 31 - 10 16 Thousand/uL Final    RBC 3 86 (*) 3 88 - 5 62 Million/uL Final    Hemoglobin 12 4  12 0 - 17 0 g/dL Final    Hematocrit 37 4  36 5 - 49 3 % Final    MCV 97  82 - 98 fL Final    MCH 32 1  26 8 - 34 3 pg Final    MCHC 33 2  31 4 - 37 4 g/dL Final    RDW 13 8  11 6 - 15 1 % Final    MPV 11 1  8 9 - 12 7 fL Final    Platelets 941 (*) 122 - 390 Thousands/uL Final    Narrative: This is an appended report  These results have been appended to a previously verified report     PROTIME-INR - Abnormal    Protime 16 6 (*) 11 6 - 14 5 seconds Final    INR 1 42 (*) 0 84 - 1 19 Final   APTT - Abnormal    PTT 48 (*) 23 - 37 seconds Final    Comment: Therapeutic Heparin Range =  60-90 seconds   COMPREHENSIVE METABOLIC PANEL - Abnormal    Sodium 132 (*) 136 - 145 mmol/L Final    Potassium 4 1  3 5 - 5 3 mmol/L Final    Chloride 97 (*) 100 - 108 mmol/L Final    CO2 24  21 - 32 mmol/L Final    ANION GAP 11  4 - 13 mmol/L Final    BUN 32 (*) 5 - 25 mg/dL Final    Creatinine 1 28  0 60 - 1 30 mg/dL Final    Comment: Standardized to IDMS reference method    Glucose 140  65 - 140 mg/dL Final    Comment: If the patient is fasting, the ADA then defines impaired fasting glucose as > 100 mg/dL and diabetes as > or equal to 123 mg/dL  Specimen collection should occur prior to Sulfasalazine administration due to the potential for falsely depressed results  Specimen collection should occur prior to Sulfapyridine administration due to the potential for falsely elevated results  Calcium 8 9  8 3 - 10 1 mg/dL Final    AST 21  5 - 45 U/L Final    Comment: Specimen collection should occur prior to Sulfasalazine administration due to the potential for falsely depressed results  ALT 24  12 - 78 U/L Final    Comment: Specimen collection should occur prior to Sulfasalazine administration due to the potential for falsely depressed results  Alkaline Phosphatase 62  46 - 116 U/L Final    Total Protein 7 8  6 4 - 8 2 g/dL Final    Albumin 3 5  3 5 - 5 0 g/dL Final    Total Bilirubin 2 15 (*) 0 20 - 1 00 mg/dL Final    Comment: Use of this assay is not recommended for patients undergoing treatment with eltrombopag due to the potential for falsely elevated results      eGFR 47  ml/min/1 73sq m Final    Narrative:     [place] guidelines for Chronic Kidney Disease (CKD):     Stage 1 with normal or high GFR (GFR > 90 mL/min/1 73 square meters)    Stage 2 Mild CKD (GFR = 60-89 mL/min/1 73 square meters)    Stage 3A Moderate CKD (GFR = 45-59 mL/min/1 73 square meters)    Stage 3B Moderate CKD (GFR = 30-44 mL/min/1 73 square meters)    Stage 4 Severe CKD (GFR = 15-29 mL/min/1 73 square meters)    Stage 5 End Stage CKD (GFR <15 mL/min/1 73 square meters)  Note: GFR calculation is accurate only with a steady state creatinine   LIPASE - Abnormal    Lipase 43 (*) 73 - 393 u/L Final LACTIC ACID, PLASMA - Abnormal    LACTIC ACID 2 2 (*) 0 5 - 2 0 mmol/L Final    Narrative:     Result may be elevated if tourniquet was used during collection  PROCALCITONIN TEST - Abnormal    Procalcitonin 0 37 (*) <=0 25 ng/ml Final    Comment: Suspected Lower Respiratory Tract Infection (LRTI):  - LESS than or EQUAL to 0 25 ng/mL:   low likelihood for bacterial LRTI; antibiotics DISCOURAGED   - GREATER than 0 25 ng/mL:   increased likelihood for bacterial LRTI; antibiotics ENCOURAGED  Suspected Sepsis:  - Strongly consider initiating antibiotics in ALL UNSTABLE patients  - LESS than or EQUAL to 0 5 ng/mL:   low likelihood for bacterial sepsis; antibiotics DISCOURAGED   - GREATER than 0 5 ng/mL:   increased likelihood for bacterial sepsis; antibiotics ENCOURAGED   - GREATER than 2 ng/mL:   high risk for severe sepsis / septic shock; antibiotics strongly ENCOURAGED  Decisions on antibiotic use should not be based solely on Procalcitonin (PCT) levels  If PCT is low but uncertainty exists with stopping antibiotics, repeat PCT in 6-24 hours to confirm the low level  If antibiotics are administered (regardless if initial PCT was high or low), repeat PCT every 1-2 days to consider early antibiotic cessation (when GREATER than 80% decrease from the peak OR when PCT drops below designated cutoffs, whichever comes first), so long as the infection is NOT one that typically requires prolonged treatment durations (e g , bone/joint infections, endocarditis, Staph  aureus bacteremia)      Situations of FALSE-POSITIVE Procalcitonin values:  1) Newborns < 67 hours old  2) Massive stress from severe trauma / burns, major surgery, acute pancreatitis, cardiogenic / hemorrhagic shock, sickle cell crisis, or other organ perfusion abnormalities  3) Malaria and some Candidal infections  4) Treatment with agents that stimulate cytokines (e g , OKT3, anti-lymphocyte globulins, alemtuzumab, IL-2, granulocyte transfusion [NOT GCSFs])  5) Chronic renal disease causes elevated baseline levels (consider GREATER than 0 75 ng/mL as an abnormal cut-off); initiating HD/CRRT may cause transient decreases  6) Paraneoplastic syndromes from medullary thyroid or SCLC, some forms of vasculitis, and acute [id number] disease    Situations of FALSE-NEGATIVE Procalcitonin values:  1) Too early in clinical course for PCT to have reached its peak (may repeat in 6-24 hours to confirm low level)  2) Localized infection WITHOUT systemic (SIRS / sepsis) response (e g , an abscess, osteomyelitis, cystitis)  3) Mycobacteria (e g , Tuberculosis, MAC)  4) Cystic fibrosis exacerbations     MANUAL DIFFERENTIAL(PHLEBS DO NOT ORDER) - Abnormal    Segmented % 87 (*) 43 - 75 % Final    Bands % 7  0 - 8 % Final    Lymphocytes % 1 (*) 14 - 44 % Final    Monocytes % 5  4 - 12 % Final    Eosinophils, % 0  0 - 6 % Final    Basophils % 0  0 - 1 % Final    Absolute Neutrophils 23 32 (*) 1 85 - 7 62 Thousand/uL Final    Lymphocytes Absolute 0 25 (*) 0 60 - 4 47 Thousand/uL Final    Monocytes Absolute 1 24 (*) 0 00 - 1 22 Thousand/uL Final    Eosinophils Absolute 0 00  0 00 - 0 40 Thousand/uL Final    Basophils Absolute 0 00  0 00 - 0 10 Thousand/uL Final    Total Counted     Final    Anisocytosis Present   Final    Platelet Estimate Borderline (*) Adequate Final   COVID19, INFLUENZA A/B, RSV PCR, [facility] - Normal    SARS-CoV-2 Negative  Negative Final    INFLUENZA A PCR Negative  Negative Final    INFLUENZA B PCR Negative  Negative Final    RSV PCR Negative  Negative Final    Narrative:     FOR PEDIATRIC PATIENTS - copy/paste COVID Guidelines URL to browser: [email or web address]  [employer]    SARS-CoV-2 assay is a Nucleic Acid Amplification assay intended for the  qualitative detection of nucleic acid from SARS-CoV-2 in nasopharyngeal  swabs  Results are for the presumptive identification of SARS-CoV-2 RNA      Positive results are indicative of infection with SARS-CoV-2, the virus  causing COVID-19, but do not rule out bacterial infection or co-infection  with other viruses  Laboratories within the [place] and its  territories are required to report all positive results to the appropriate  public health authorities  Negative results do not preclude SARS-CoV-2  infection and should not be used as the sole basis for treatment or other  patient management decisions  Negative results must be combined with  clinical observations, patient history, and epidemiological information  This test has not been FDA cleared or approved  This test has been authorized by FDA under an Emergency Use Authorization  (EUA)  This test is only authorized for the duration of time the  declaration that circumstances exist justifying the authorization of the  emergency use of an in vitro diagnostic tests for detection of SARS-CoV-2  virus and/or diagnosis of COVID-19 infection under section 564(b)(1) of  the Act, 21 U  S C  363PWS-7(D)(8), unless the authorization is terminated  or revoked sooner  The test has been validated but independent review by FDA  and CLIA is pending  Test performed using [employer] Gene[employer]: This RT-PCR assay targets N2,  a region unique to SARS-CoV-2  A conserved region in the E-gene was chosen  for pan-Sarbecovirus detection which includes SARS-CoV-2  MAGNESIUM - Normal    Magnesium 2 2  1 6 - 2 6 mg/dL Final   HS TROPONIN I 0HR - Normal    hs TnI 0hr 27  "Refer to ACS Flowchart"- see link ng/L Final    Comment:                                              Initial (time 0) result  If >=50 ng/L, Myocardial injury suggested ;  Type of myocardial injury and treatment strategy  to be determined  If 5-49 ng/L, a delta result at 2 hours and or 4 hours will be needed to further evaluate  If <4 ng/L, and chest pain has been >3 hours since onset, patient may qualify for discharge based on the HEART score in the ED    If <5 ng/L and <3hours since onset of chest pain, a delta result at 2 hours will be needed to further evaluate  HS Troponin 99th Percentile URL of a Health Population=12 ng/L with a 95% Confidence Interval of 8-18 ng/L  Second Troponin (time 2 hours)  If calculated delta >= 20 ng/L,  Myocardial injury suggested ; Type of myocardial injury and treatment strategy to be determined  If 5-49 ng/L and the calculated delta is 5-19 ng/L, consult medical service for evaluation  Continue evaluation for ischemia on ecg and other possible etiology and repeat hs troponin at 4 hours  If delta is <5 ng/L at 2 hours, consider discharge based on risk stratification via the HEART score (if in ED), or BREA risk score in IP/Observation  HS Troponin 99th Percentile URL of a Health Population=12 ng/L with a 95% Confidence Interval of 8-18 ng/L    BLOOD CULTURE   BLOOD CULTURE   HS TROPONIN I 2HR   LACTIC ACID 2 HOUR         Final Diagnosis:  1  Sepsis ([address] )    2  Pneumonia        P:  - patient presents for evaluation of generalized complaints  Given his advanced age will evaluate for infectious ideology as well as cardiac pathology  CTA of head and neck, CT abdomen pelvis  COVID, lactic/CBC/CMP  -patient daughter came out of patient's room and told me that the patient had attempted to have a neighbor get him illegal drugs because he was tired of living and is concerned about his suicidal ideations  Apparently the patient would often a joke about this in the past but he has been doing more so recently  -I also discussed this with the patient's granddaughter, Bree Landrum, who is a physician in another hospital network  He was stating that the family has become increasingly concerned with these suicidal statements  She is concerned that the patient has multiple guns at home as he was an avid [employer] earlier in life   -laboratory analysis showed marked leukocytosis at over 24,000, elevated pro calc, elevated lactate consistent with sepsis    Chest x-ray showed consolidation left lower lobe  This was confirmed on CT scan of abdomen and pelvis  I discussed the results with family  Provided with Rocephin  Patient was admitted to the hospital for further treatment and evaluation  Medications   cefTRIAXone (ROCEPHIN) IVPB (premix in dextrose) 1,000 mg 50 mL (has no administration in time range)   sodium chloride 0 9 % bolus 1,000 mL (1,000 mL Intravenous New Bag 4/10/22 1835)   ondansetron (ZOFRAN) injection 4 mg (4 mg Intravenous Given 4/10/22 1828)   meclizine (ANTIVERT) tablet 25 mg (25 mg Oral Given 4/10/22 1830)   iohexol (OMNIPAQUE) 350 MG/ML injection (SINGLE-DOSE) 100 mL (100 mL Intravenous Given 4/10/22 1910)     Time reflects when diagnosis was documented in both MDM as applicable and the Disposition within this note     Time User Action Codes Description Comment    4/10/2022  7:58 PM Geovani Patel Add [A41 9] Sepsis ([address] )     4/10/2022  7:59 PM Geovani Patel Add [J18 9] Pneumonia       ED Disposition     ED Disposition Condition Date/Time Comment    Admit Stable Sun Apr 10, 2022  7:58 PM Case was discussed with BRADY and the patient's admission status was agreed to be Admission Status: inpatient status to the service of Dr Dhiraj Mckenzie   Follow-up Information    None       Patient's Medications   Discharge Prescriptions    No medications on file     No discharge procedures on file  Prior to Admission Medications   Prescriptions Last Dose Informant Patient Reported? Taking?    Ascorbic Acid (VITAMIN C) 1000 MG tablet  Self Yes No   Sig: Take 1 tablet by mouth daily   Cholecalciferol (VITAMIN D3) 1000 units CAPS  Self Yes No   Sig: Take 1 tablet by mouth daily   Multiple Vitamins-Minerals (ICAPS AREDS 2 PO)  Self Yes No   Sig: Take by mouth   Patient not taking: Reported on 2/24/2022    Red Yeast Rice 600 MG TABS  Self Yes No   Sig: Take by mouth daily   aspirin 325 mg tablet  Self Yes No   Sig: Take 1 tablet by mouth daily cyanocobalamin (VITAMIN B-12) 100 mcg tablet  Self Yes No   Sig: Take 1 tablet by mouth daily   finasteride (PROSCAR) 5 mg tablet  Self No No   Sig: TAKE 1 TABLET BY MOUTH EVERY DAY   metoprolol succinate (TOPROL-XL) 50 mg 24 hr tablet  Self No No   Sig: TAKE 1 5 TABLET DAILY   neomycin-polymyxin-hydrocortisone (CORTISPORIN) otic solution  Self No No   Sig: Administer 3 drops to the right ear 2 (two) times a day   Patient not taking: Reported on 2/24/2022    tamsulosin (FLOMAX) 0 4 mg  Self No No   Sig: TAKE 1 CAPSULE BY MOUTH EVERY DAY      Facility-Administered Medications: None       Portions of the record may have been created with voice recognition software  Occasional wrong word or "sound a like" substitutions may have occurred due to the inherent limitations of voice recognition software  Read the chart carefully and recognize, using context, where substitutions have occurred      Electronically signed by:  MD Lasha Stubbs MD  04/10/22 2001

## 2022-04-11 PROBLEM — R45.851 SUICIDAL IDEATION: Status: RESOLVED | Noted: 2022-04-10 | Resolved: 2022-04-11

## 2022-04-11 LAB
ALBUMIN SERPL BCP-MCNC: 2.8 G/DL (ref 3.5–5)
ALP SERPL-CCNC: 51 U/L (ref 46–116)
ALT SERPL W P-5'-P-CCNC: 23 U/L (ref 12–78)
ANION GAP SERPL CALCULATED.3IONS-SCNC: 9 MMOL/L (ref 4–13)
AST SERPL W P-5'-P-CCNC: 23 U/L (ref 5–45)
ATRIAL RATE: 326 BPM
ATRIAL RATE: 75 BPM
BASOPHILS # BLD AUTO: 0.02 THOUSANDS/ΜL (ref 0–0.1)
BASOPHILS NFR BLD AUTO: 0 % (ref 0–1)
BILIRUB SERPL-MCNC: 1.43 MG/DL (ref 0.2–1)
BUN SERPL-MCNC: 30 MG/DL (ref 5–25)
CALCIUM ALBUM COR SERPL-MCNC: 9.1 MG/DL (ref 8.3–10.1)
CALCIUM SERPL-MCNC: 8.1 MG/DL (ref 8.3–10.1)
CHLORIDE SERPL-SCNC: 100 MMOL/L (ref 100–108)
CO2 SERPL-SCNC: 24 MMOL/L (ref 21–32)
CREAT SERPL-MCNC: 1.14 MG/DL (ref 0.6–1.3)
EOSINOPHIL # BLD AUTO: 0 THOUSAND/ΜL (ref 0–0.61)
EOSINOPHIL NFR BLD AUTO: 0 % (ref 0–6)
ERYTHROCYTE [DISTWIDTH] IN BLOOD BY AUTOMATED COUNT: 13.8 % (ref 11.6–15.1)
GFR SERPL CREATININE-BSD FRML MDRD: 55 ML/MIN/1.73SQ M
GLUCOSE SERPL-MCNC: 115 MG/DL (ref 65–140)
HCT VFR BLD AUTO: 32.2 % (ref 36.5–49.3)
HGB BLD-MCNC: 10.7 G/DL (ref 12–17)
IMM GRANULOCYTES # BLD AUTO: 0.49 THOUSAND/UL (ref 0–0.2)
IMM GRANULOCYTES NFR BLD AUTO: 2 % (ref 0–2)
L PNEUMO1 AG UR QL IA.RAPID: NEGATIVE
LYMPHOCYTES # BLD AUTO: 0.56 THOUSANDS/ΜL (ref 0.6–4.47)
LYMPHOCYTES NFR BLD AUTO: 3 % (ref 14–44)
MAGNESIUM SERPL-MCNC: 2.1 MG/DL (ref 1.6–2.6)
MCH RBC QN AUTO: 32.2 PG (ref 26.8–34.3)
MCHC RBC AUTO-ENTMCNC: 33.2 G/DL (ref 31.4–37.4)
MCV RBC AUTO: 97 FL (ref 82–98)
MONOCYTES # BLD AUTO: 1.32 THOUSAND/ΜL (ref 0.17–1.22)
MONOCYTES NFR BLD AUTO: 6 % (ref 4–12)
NEUTROPHILS # BLD AUTO: 18.83 THOUSANDS/ΜL (ref 1.85–7.62)
NEUTS SEG NFR BLD AUTO: 89 % (ref 43–75)
NRBC BLD AUTO-RTO: 0 /100 WBCS
PHOSPHATE SERPL-MCNC: 2.2 MG/DL (ref 2.3–4.1)
PLATELET # BLD AUTO: 126 THOUSANDS/UL (ref 149–390)
PMV BLD AUTO: 10.9 FL (ref 8.9–12.7)
POTASSIUM SERPL-SCNC: 4 MMOL/L (ref 3.5–5.3)
PROCALCITONIN SERPL-MCNC: 0.51 NG/ML
PROT SERPL-MCNC: 6.5 G/DL (ref 6.4–8.2)
QRS AXIS: -75 DEGREES
QRS AXIS: -76 DEGREES
QRSD INTERVAL: 132 MS
QRSD INTERVAL: 162 MS
QT INTERVAL: 442 MS
QT INTERVAL: 458 MS
QTC INTERVAL: 483 MS
QTC INTERVAL: 490 MS
RBC # BLD AUTO: 3.32 MILLION/UL (ref 3.88–5.62)
S PNEUM AG UR QL: NEGATIVE
SODIUM SERPL-SCNC: 133 MMOL/L (ref 136–145)
T WAVE AXIS: 108 DEGREES
T WAVE AXIS: 98 DEGREES
VENTRICULAR RATE: 69 BPM
VENTRICULAR RATE: 72 BPM
WBC # BLD AUTO: 21.22 THOUSAND/UL (ref 4.31–10.16)

## 2022-04-11 PROCEDURE — 85025 COMPLETE CBC W/AUTO DIFF WBC: CPT

## 2022-04-11 PROCEDURE — 99232 SBSQ HOSP IP/OBS MODERATE 35: CPT | Performed by: INTERNAL MEDICINE

## 2022-04-11 PROCEDURE — 93010 ELECTROCARDIOGRAM REPORT: CPT | Performed by: INTERNAL MEDICINE

## 2022-04-11 PROCEDURE — 83735 ASSAY OF MAGNESIUM: CPT

## 2022-04-11 PROCEDURE — 99221 1ST HOSP IP/OBS SF/LOW 40: CPT | Performed by: PSYCHIATRY & NEUROLOGY

## 2022-04-11 PROCEDURE — 84145 PROCALCITONIN (PCT): CPT

## 2022-04-11 PROCEDURE — 84100 ASSAY OF PHOSPHORUS: CPT

## 2022-04-11 PROCEDURE — 87205 SMEAR GRAM STAIN: CPT

## 2022-04-11 PROCEDURE — 80053 COMPREHEN METABOLIC PANEL: CPT

## 2022-04-11 RX ADMIN — FINASTERIDE 5 MG: 5 TABLET, FILM COATED ORAL at 08:03

## 2022-04-11 RX ADMIN — VITAM B12 100 MCG: 100 TAB at 08:03

## 2022-04-11 RX ADMIN — GUAIFENESIN 600 MG: 600 TABLET ORAL at 17:14

## 2022-04-11 RX ADMIN — CEFTRIAXONE 1000 MG: 1 INJECTION, SOLUTION INTRAVENOUS at 19:40

## 2022-04-11 RX ADMIN — TAMSULOSIN HYDROCHLORIDE 0.4 MG: 0.4 CAPSULE ORAL at 08:03

## 2022-04-11 RX ADMIN — ENOXAPARIN SODIUM 40 MG: 40 INJECTION SUBCUTANEOUS at 08:03

## 2022-04-11 RX ADMIN — Medication 1000 UNITS: at 08:03

## 2022-04-11 RX ADMIN — ASPIRIN 325 MG ORAL TABLET 325 MG: 325 PILL ORAL at 08:03

## 2022-04-11 RX ADMIN — SODIUM CHLORIDE, SODIUM GLUCONATE, SODIUM ACETATE, POTASSIUM CHLORIDE, MAGNESIUM CHLORIDE, SODIUM PHOSPHATE, DIBASIC, AND POTASSIUM PHOSPHATE 75 ML/HR: .53; .5; .37; .037; .03; .012; .00082 INJECTION, SOLUTION INTRAVENOUS at 08:23

## 2022-04-11 RX ADMIN — METOPROLOL SUCCINATE 75 MG: 50 TABLET, EXTENDED RELEASE ORAL at 08:03

## 2022-04-11 RX ADMIN — GUAIFENESIN 600 MG: 600 TABLET ORAL at 08:03

## 2022-04-11 NOTE — RESPIRATORY THERAPY NOTE
RT Protocol Note  Whit Griggs 80 y o  male MRN: [id number]  Unit/Bed#: 409-01 Encounter: [id number]    Assessment    Principal Problem:    Sepsis without acute organ dysfunction ([address] )  Active Problems:    Permanent atrial fibrillation (HCC)    Pneumonia of left lower lobe due to infectious organism    Suicidal ideation    Hyponatremia      Home Pulmonary Medications:  Patient denies any respiratory medications, he denies oxygen and pap therapy       Past Medical History:   Diagnosis Date    Arthritis     Atrial fibrillation (HCC)     Last Assessed:8/10/17    Benign prostatic hyperplasia     Last Assessed:14    BPH (benign prostatic hyperplasia)     Complete atrioventricular block (HCC)     Diverticulosis     Last Assessed:13    Hypercholesterolemia     Hypertension     Paroxysmal ventricular tachycardia (HCC)     Last Assessed:17    Prostatitis     Last Assessed:12    Pulmonary artery hypertension (HCC)     mild pulmonary htn    Retention, urine     Right bundle branch block     Last Assessed:13    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     Last CGEICAQQC:     Social History     Socioeconomic History    Marital status:       Spouse name: None    Number of children: None    Years of education: None    Highest education level: None   Occupational History    Occupation: retired   Tobacco Use    Smoking status: Former Smoker     Quit date:      Years since quittin 3    Smokeless tobacco: Never Used   Vaping Use    Vaping Use: Never used   Substance and Sexual Activity    Alcohol use: Not Currently     Alcohol/week: 0 0 standard drinks     Comment: social drinker    Drug use: No    Sexual activity: None   Other Topics Concern    None   Social History Narrative    Advanced directive discussed with patient    Always uses seatbelt    Always uses sunscreen    Caffeine use- 1 soda on occasion    Regular dental care     Social Determinants of Health Financial Resource Strain: Not on file   Food Insecurity: Not on file   Transportation Needs: Not on file   Physical Activity: Not on file   Stress: Not on file   Social Connections: Not on file   Intimate Partner Violence: Not on file   Housing Stability: Not on file       Subjective    Subjective Data: patient denies sob    Objective    Physical Exam:   General Appearance: Alert,Awake  Respiratory Pattern: Normal  Chest Assessment: Chest expansion symmetrical,Trachea midline  R Breath Sounds: Clear  L Breath Sounds: Rales  Cough: Non-productive,Dry  O2 Device: 2 lpm nasal cannula    Vitals:  Blood pressure 152/63, pulse 60, temperature 98 4 °F (36 9 °C), temperature source Temporal, resp  rate 18, weight 87 3 kg (192 lb 7 4 oz), SpO2 96 %  Imaging and other studies: I have personally reviewed pertinent reports  O2 Device: 2 lpm nasal cannula     Plan    Respiratory Plan: No distress/Pulmonary history  Airway Clearance Plan: Incentive Spirometer     Resp Comments: Received patient in bed, he is on 2 lpm nasal cannula, he was reading the newspaper, he was not in respiratory distress  BS: some rales noted LLL  Patient denies any home respiratory medications, no oxygen or pap therapy  He is [place], his best ear is his right   Instructed and educated patient on the use of the incentive spirometer to be used Q1H, patient gave good return demonstration

## 2022-04-11 NOTE — PLAN OF CARE
Problem: Potential for Falls  Goal: Patient will remain free of falls  Description: INTERVENTIONS:  - Educate patient/family on patient safety including physical limitations  - Instruct patient to call for assistance with activity   - Consult OT/PT to assist with strengthening/mobility   - Keep Call bell within reach  - Keep bed low and locked with side rails adjusted as appropriate  - Keep care items and personal belongings within reach  - Initiate and maintain comfort rounds  - Make Fall Risk Sign visible to staff  - Offer Toileting every two  Hours, in advance of need  - Initiate/Maintain bed alarm  - Obtain necessary fall risk management equipment: non   - Apply yellow socks and bracelet for high fall risk patients  - Consider moving patient to room near nurses station  Outcome: Progressing     Problem: PAIN - ADULT  Goal: Verbalizes/displays adequate comfort level or baseline comfort level  Description: Interventions:  - Encourage patient to monitor pain and request assistance  - Assess pain using appropriate pain scale  - Administer analgesics based on type and severity of pain and evaluate response  - Implement non-pharmacological measures as appropriate and evaluate response  - Consider cultural and social influences on pain and pain management  - Notify physician/advanced practitioner if interventions unsuccessful or patient reports new pain  Outcome: Progressing     Problem: INFECTION - ADULT  Goal: Absence or prevention of progression during hospitalization  Description: INTERVENTIONS:  - Assess and monitor for signs and symptoms of infection  - Monitor lab/diagnostic results  - Monitor all insertion sites, i e  indwelling lines, tubes, and drains  - [place] appropriate cooling/warming therapies per order  - Administer medications as ordered  - Instruct and encourage patient and family to use good hand hygiene technique  - Identify and instruct in appropriate isolation precautions for identified infection/condition  Outcome: Progressing     Problem: SAFETY ADULT  Goal: Patient will remain free of falls  Description: INTERVENTIONS:  - Educate patient/family on patient safety including physical limitations  - Instruct patient to call for assistance with activity   - Consult OT/PT to assist with strengthening/mobility   - Keep Call bell within reach  - Keep bed low and locked with side rails adjusted as appropriate  - Keep care items and personal belongings within reach  - Initiate and maintain comfort rounds  - Make Fall Risk Sign visible to staff  - Offer Toileting every two  Hours, in advance of need  - Initiate/Maintain bed alarm  - Obtain necessary fall risk management equipment: non   - Apply yellow socks and bracelet for high fall risk patients  - Consider moving patient to room near nurses station  Outcome: Progressing  Goal: Maintain or return to baseline ADL function  Description: INTERVENTIONS:  -  Assess patient's ability to carry out ADLs; assess patient's baseline for ADL function and identify physical deficits which impact ability to perform ADLs (bathing, care of mouth/teeth, toileting, grooming, dressing, etc )  - Assess/evaluate cause of self-care deficits   - Assess range of motion  - Assess patient's mobility; develop plan if impaired  - Assess patient's need for assistive devices and provide as appropriate  - Encourage maximum independence but intervene and supervise when necessary  - Involve family in performance of ADLs  - Assess for home care needs following discharge   - Consider OT consult to assist with ADL evaluation and planning for discharge  - Provide patient education as appropriate  Outcome: Progressing  Goal: Maintains/Returns to pre admission functional level  Description: INTERVENTIONS:  - Perform BMAT or MOVE assessment daily    - Set and communicate daily mobility goal to care team and patient/family/caregiver     - Collaborate with rehabilitation services on mobility goals if consulted  - Perform Range of Motion three times a day  - Stand patient three times a day  - Ambulate patient three times a day  - Out of bed to chair three times a day   - Out of bed for meals three times a day  - Out of bed for toileting  - Record patient progress and toleration of activity level   Outcome: Progressing     Problem: DISCHARGE PLANNING  Goal: Discharge to home or other facility with appropriate resources  Description: INTERVENTIONS:  - Identify barriers to discharge w/patient and caregiver  - Arrange for needed discharge resources and transportation as appropriate  - Identify discharge learning needs (meds, wound care, etc )  - Arrange for interpretive services to assist at discharge as needed  - Refer to Case Management Department for coordinating discharge planning if the patient needs post-hospital services based on physician/advanced practitioner order or complex needs related to functional status, cognitive ability, or social support system  Outcome: Progressing     Problem: Knowledge Deficit  Goal: Patient/family/caregiver demonstrates understanding of disease process, treatment plan, medications, and discharge instructions  Description: Complete learning assessment and assess knowledge base    Interventions:  - Provide teaching at level of understanding  - Provide teaching via preferred learning methods  Outcome: Progressing     Problem: RESPIRATORY - ADULT  Goal: Achieves optimal ventilation and oxygenation  Description: INTERVENTIONS:  - Assess for changes in respiratory status  - Assess for changes in mentation and behavior  - Position to facilitate oxygenation and minimize respiratory effort  - Oxygen administered by appropriate delivery if ordered  - Initiate smoking cessation education as indicated  - Encourage broncho-pulmonary hygiene including cough, deep breathe, Incentive Spirometry  - Assess the need for suctioning and aspirate as needed  - Assess and instruct to report SOB or any respiratory difficulty  - Respiratory Therapy support as indicated  Outcome: Progressing

## 2022-04-11 NOTE — ASSESSMENT & PLAN NOTE
· Patient's daughter and granddaughter expressed  to the ED provider that they are concern for suicidal ideation in the patient  They state that he passively makes jokes about committing suicide  They state that these have increased in frequency as of recent  Patient's granddaughter states that she is concerned since the patient does have firearms in the home and was formerly an avid [employer]  I attempted to get into contact with the patient's daughter but was unable to  The patient denies any suicidal ideation states that these are simply jokes  · Follow-up note per the medicine team; 4/11  Patient notes when he is sick he experiences suicidal thoughts  Today he does not have any active suicidal thoughts or depression signs or symptoms  He does not have any history of suicidal attempts  No history of major depressive disorder or major psychiatric illness  He appears in stable condition  Agree with the  Psychiatric team Recs    · No active suicidal thoughts, or depression noted  · No need for psychiatric outpatient follow-up, or behavior health rehab/admission  · Recommended removal of firearms from the house  · Follow-up with primary care physician accordingly

## 2022-04-11 NOTE — ASSESSMENT & PLAN NOTE
· Patient had sodium 132 upon admission  This is below his baseline seen on past labs  · Most likely etiology is dehydration due to decreased p o  Intake  · Patient placed on isolyte 75 mL/hr IV fluids  · Monitor a m   Metabolic panel

## 2022-04-11 NOTE — ASSESSMENT & PLAN NOTE
· Patient has had generalized weakness, nausea, decreased appetite and increased cough over the past 2 days  · Chest x-ray and CT of the abdomen and pelvis showed left lower lobe pneumonia    · Procalcitonin elevated at 0 7>> 0 51>>>> 0 37  · Lactic acid 1 6>>>> 2 2  · White blood cells 22 7>>> 21>>>> 24 81    · COVID/flu/RSV negative  · Urine Legionella and strep pneumonia test negative    · Modify and titrate oxygen therapy based on patient's needs and guideline  · Currently maintained on room air  · Follow-up Sputum, urine, blood cultures  · Continue ceftriaxone day 3    · Respiratory protocol  · Incentive spirometry  · Continue to Encourage oral diet  · Monitor I/OS  · A m  labs    ( trend procal /WBC)  · Seen by PT OT:  Recommended home with health aide ( Case Management office working on it)

## 2022-04-11 NOTE — ASSESSMENT & PLAN NOTE
· Patient has had generalized weakness, nausea, decreased appetite and increased cough over the past 2 days  · Chest x-ray and CT of the abdomen and pelvis showed left lower lobe pneumonia    · Procalcitonin elevated 0 51>>>> 0 37  · Lactic acid 1 6>>>> 2 2  · White blood cells 21>>>> 24 81    · COVID/flu/RSV negative  · Most likely source for sepsis  · Urine Legionella and strep pneumonia test negative    · Modify and titrate oxygen therapy based on patient's needs and guideline  Currently maintained on 1 5 liter/minute by nasal cannula  · Follow-up Sputum, urine, blood cultures  · Continue ceftriaxone day 2   · Respiratory protocol  · Incentive spirometry  · Encourage oral diet  · Monitor I/OS  · A m  labs

## 2022-04-11 NOTE — ASSESSMENT & PLAN NOTE
· Patient's daughter and granddaughter expressed  to the ED provider that they are concern for suicidal ideation in the patient  They state that he passively makes jokes about committing suicide  They state that these have increased in frequency as of recent  Patient's granddaughter states that she is concerned since the patient does have firearms in the home and was formerly an avid [employer]  I attempted to get into contact with the patient's daughter but was unable to  The patient denies any suicidal ideation states that these are simply jokes       · Psychiatry consult

## 2022-04-11 NOTE — PLAN OF CARE
Problem: Potential for Falls  Goal: Patient will remain free of falls  Description: INTERVENTIONS:  - Educate patient/family on patient safety including physical limitations  - Instruct patient to call for assistance with activity   - Consult OT/PT to assist with strengthening/mobility   - Keep Call bell within reach  - Keep bed low and locked with side rails adjusted as appropriate  - Keep care items and personal belongings within reach  - Initiate and maintain comfort rounds  - Make Fall Risk Sign visible to staff  - Offer Toileting every two  Hours, in advance of need  - Initiate/Maintain bed alarm  - Obtain necessary fall risk management equipment: non   - Apply yellow socks and bracelet for high fall risk patients  - Consider moving patient to room near nurses station  Outcome: Progressing     Problem: PAIN - ADULT  Goal: Verbalizes/displays adequate comfort level or baseline comfort level  Description: Interventions:  - Encourage patient to monitor pain and request assistance  - Assess pain using appropriate pain scale  - Administer analgesics based on type and severity of pain and evaluate response  - Implement non-pharmacological measures as appropriate and evaluate response  - Consider cultural and social influences on pain and pain management  - Notify physician/advanced practitioner if interventions unsuccessful or patient reports new pain  Outcome: Progressing     Problem: INFECTION - ADULT  Goal: Absence or prevention of progression during hospitalization  Description: INTERVENTIONS:  - Assess and monitor for signs and symptoms of infection  - Monitor lab/diagnostic results  - Monitor all insertion sites, i e  indwelling lines, tubes, and drains  - [place] appropriate cooling/warming therapies per order  - Administer medications as ordered  - Instruct and encourage patient and family to use good hand hygiene technique  - Identify and instruct in appropriate isolation precautions for identified infection/condition  Outcome: Progressing     Problem: SAFETY ADULT  Goal: Patient will remain free of falls  Description: INTERVENTIONS:  - Educate patient/family on patient safety including physical limitations  - Instruct patient to call for assistance with activity   - Consult OT/PT to assist with strengthening/mobility   - Keep Call bell within reach  - Keep bed low and locked with side rails adjusted as appropriate  - Keep care items and personal belongings within reach  - Initiate and maintain comfort rounds  - Make Fall Risk Sign visible to staff  - Offer Toileting every two  Hours, in advance of need  - Initiate/Maintain bed alarm  - Obtain necessary fall risk management equipment: non   - Apply yellow socks and bracelet for high fall risk patients  - Consider moving patient to room near nurses station  Outcome: Progressing  Goal: Maintain or return to baseline ADL function  Description: INTERVENTIONS:  -  Assess patient's ability to carry out ADLs; assess patient's baseline for ADL function and identify physical deficits which impact ability to perform ADLs (bathing, care of mouth/teeth, toileting, grooming, dressing, etc )  - Assess/evaluate cause of self-care deficits   - Assess range of motion  - Assess patient's mobility; develop plan if impaired  - Assess patient's need for assistive devices and provide as appropriate  - Encourage maximum independence but intervene and supervise when necessary  - Involve family in performance of ADLs  - Assess for home care needs following discharge   - Consider OT consult to assist with ADL evaluation and planning for discharge  - Provide patient education as appropriate  Outcome: Progressing  Goal: Maintains/Returns to pre admission functional level  Description: INTERVENTIONS:  - Perform BMAT or MOVE assessment daily    - Set and communicate daily mobility goal to care team and patient/family/caregiver     - Collaborate with rehabilitation services on mobility goals if consulted  - Perform Range of Motion three times a day  - Stand patient three times a day  - Ambulate patient three times a day  - Out of bed to chair three times a day   - Out of bed for meals three times a day  - Out of bed for toileting  - Record patient progress and toleration of activity level   Outcome: Progressing     Problem: DISCHARGE PLANNING  Goal: Discharge to home or other facility with appropriate resources  Description: INTERVENTIONS:  - Identify barriers to discharge w/patient and caregiver  - Arrange for needed discharge resources and transportation as appropriate  - Identify discharge learning needs (meds, wound care, etc )  - Arrange for interpretive services to assist at discharge as needed  - Refer to Case Management Department for coordinating discharge planning if the patient needs post-hospital services based on physician/advanced practitioner order or complex needs related to functional status, cognitive ability, or social support system  Outcome: Progressing     Problem: Knowledge Deficit  Goal: Patient/family/caregiver demonstrates understanding of disease process, treatment plan, medications, and discharge instructions  Description: Complete learning assessment and assess knowledge base    Interventions:  - Provide teaching at level of understanding  - Provide teaching via preferred learning methods  Outcome: Progressing     Problem: RESPIRATORY - ADULT  Goal: Achieves optimal ventilation and oxygenation  Description: INTERVENTIONS:  - Assess for changes in respiratory status  - Assess for changes in mentation and behavior  - Position to facilitate oxygenation and minimize respiratory effort  - Oxygen administered by appropriate delivery if ordered  - Initiate smoking cessation education as indicated  - Encourage broncho-pulmonary hygiene including cough, deep breathe, Incentive Spirometry  - Assess the need for suctioning and aspirate as needed  - Assess and instruct to report SOB or any respiratory difficulty  - Respiratory Therapy support as indicated  Outcome: Progressing     Problem: MOBILITY - ADULT  Goal: Maintain or return to baseline ADL function  Description: INTERVENTIONS:  -  Assess patient's ability to carry out ADLs; assess patient's baseline for ADL function and identify physical deficits which impact ability to perform ADLs (bathing, care of mouth/teeth, toileting, grooming, dressing, etc )  - Assess/evaluate cause of self-care deficits   - Assess range of motion  - Assess patient's mobility; develop plan if impaired  - Assess patient's need for assistive devices and provide as appropriate  - Encourage maximum independence but intervene and supervise when necessary  - Involve family in performance of ADLs  - Assess for home care needs following discharge   - Consider OT consult to assist with ADL evaluation and planning for discharge  - Provide patient education as appropriate  Outcome: Progressing  Goal: Maintains/Returns to pre admission functional level  Description: INTERVENTIONS:  - Perform BMAT or MOVE assessment daily    - Set and communicate daily mobility goal to care team and patient/family/caregiver  - Collaborate with rehabilitation services on mobility goals if consulted  - Perform Range of Motion three times a day    - Stand patient three times a day  - Ambulate patient three times a day  - Out of bed to chair three times a day   - Out of bed for meals three times a day  - Out of bed for toileting  - Record patient progress and toleration of activity level   Outcome: Progressing

## 2022-04-11 NOTE — ASSESSMENT & PLAN NOTE
This is a chronic condition  Controlled on the following medications  -follows up regularly with cardiology at [address] [place] [address]   -pacemaker in place      · Patient currently rate controlled with metoprolol succinate 24 hour tablets 75 mg oral daily  · Rate is currently well controlled at 60  · Patient takes aspirin 325 mg daily does not use any other anticoagulation     ·  continue aspirin 325 mg daily  ·  continue to monitor rate with routine vitals checks

## 2022-04-11 NOTE — ASSESSMENT & PLAN NOTE
· Upon admission, patient meeting sepsis criteria for leukocytosis with white blood cells 24 81 elevated lactate of 2 2  · Most likely source is left lower lobe pneumonia  · Procalcitonin slightly elevated at 0 37  · Chest x-ray and CT the abdomen pelvis showed the left lower lobe pneumonia  · Urine Legionella and strep pneumoniae tests ordered  · Sputum cultures  · Blood culture sent  · Ceftriaxone 1000 mg IV Q 24 hours  · Will trend CBC, lactic acid, procalcitonin

## 2022-04-11 NOTE — ASSESSMENT & PLAN NOTE
This is a chronic condition    Stable      · At present patient does not have any urinary complaints  · Patient currently takes Flomax and Proscar at home  · Will continue this inpatient  · Monitor for urinary retention

## 2022-04-11 NOTE — ASSESSMENT & PLAN NOTE
· Upon admission, patient met sepsis criteria for leukocytosis with white blood cells 24 81 and elevated lactate of 2 2, and altered mental status  · Active source of infection identified as left lower lobe pneumonia    CT abdomen pelvis:  Left lower lobe consolidation may resemble pneumonia  Procal:  0 7>> 0 51>>>>> 0 37  WBC 22 7>>> 21  Urine Legionella and strep:  Negative  MRSA, COVID, flu:  Negative  Blood and urine cultures no growth to date  · No home oxygen needs  Weaned off oxygen therapy  Maintaining oxygen saturation on room air  · Respiratory protocol  Modify oxygen therapy per guideline  Encourage incentive spirometry  · Continue trend procal, CBC, CMP  · Follow-up blood, urine cultures  · Continue ceftriaxone day 3

## 2022-04-11 NOTE — ASSESSMENT & PLAN NOTE
· Patient currently rate controlled with metoprolol succinate 24 hour tablets 75 mg oral daily  · Patient also does have pacemaker in place  · Rate is currently well controlled at 60  · Patient takes aspirin 325 mg daily does not use any other anticoagulation   · Will continue aspirin 325 mg daily  · Will continue to monitor rate with routine vitals checks

## 2022-04-11 NOTE — ASSESSMENT & PLAN NOTE
· Patient had sodium 133 today  This is below his baseline seen on past labs  · Most likely etiology is dehydration due to decreased p o  Intake  · Patient placed on isolyte 75 mL/hr IV fluids  · Monitor a m   Metabolic panel

## 2022-04-11 NOTE — H&P
[address] [id number] [place]  H&P- Cleave Ranulfo 5/14/1928, 80 y o  male MRN: [id number]  Unit/Bed#: 409-01 Encounter: [id number]  Primary Care Provider: Oj Clements DO   Date and time admitted to hospital: 4/10/2022  6:02 PM    * Sepsis without acute organ dysfunction [facility])  Assessment & Plan  · Upon admission, patient meeting sepsis criteria for leukocytosis with white blood cells 24 81 elevated lactate of 2 2  · Most likely source is left lower lobe pneumonia  · Procalcitonin slightly elevated at 0 37  · Chest x-ray and CT the abdomen pelvis showed the left lower lobe pneumonia  · Urine Legionella and strep pneumoniae tests ordered  · Sputum cultures  · Blood culture sent  · Ceftriaxone 1000 mg IV Q 24 hours  · Will trend CBC, lactic acid, procalcitonin      Pneumonia of left lower lobe due to infectious organism  Assessment & Plan  · Patient has had generalized weakness, nausea, decreased appetite and increased cough over the past 2 days  · Chest x-ray and CT of the abdomen and pelvis showed left lower lobe pneumonia  · Procalcitonin elevated at 0 37  · Lactic acid 2 2  · White blood cells 24 81  · COVID/flu/RSV negative  · Most likely source for sepsis  · Urine Legionella and strep pneumonia test ordered  · Sputum cultures  · Blood cultures sent  · Ceftriaxone 1000 mg IV Q 24    Hyponatremia  Assessment & Plan  · Patient had sodium 132 upon admission  This is below his baseline seen on past labs  · Most likely etiology is dehydration due to decreased p o  Intake  · Patient placed on isolyte 75 mL/hr IV fluids  · Monitor a m   Metabolic panel    Permanent atrial fibrillation (HCC)  Assessment & Plan  · Patient currently rate controlled with metoprolol succinate 24 hour tablets 75 mg oral daily  · Patient also does have pacemaker in place  · Rate is currently well controlled at 60  · Patient takes aspirin 325 mg daily does not use any other anticoagulation   · Will continue aspirin 325 mg daily  · Will continue to monitor rate with routine vitals checks    Benign prostatic hyperplasia  Assessment & Plan  · At present patient does not have any urinary complaints  · Patient currently takes Flomax and Proscar at home  · Will continue this inpatient  · Monitor for urinary retention    Suicidal ideation  Assessment & Plan  · Patient's daughter and granddaughter expressed  to the ED provider that they are concern for suicidal ideation in the patient  They state that he passively makes jokes about committing suicide  They state that these have increased in frequency as of recent  Patient's granddaughter states that she is concerned since the patient does have firearms in the home and was formerly an avid [employer]  I attempted to get into contact with the patient's daughter but was unable to  The patient denies any suicidal ideation states that these are simply jokes  · Psychiatry consult    VTE Pharmacologic Prophylaxis: VTE Score: 6 High Risk (Score >/= 5) - Pharmacological DVT Prophylaxis Ordered: enoxaparin (Lovenox)  Sequential Compression Devices Ordered  Code Status: Level 1 - Full Code   Discussion with family: Attempted to update  (daughter) via phone  Unable to contact  Anticipated Length of Stay: Patient will be admitted on an inpatient basis with an anticipated length of stay of greater than 2 midnights secondary to sepsis, LLL pneumonia  Total Time for Visit, including Counseling / Coordination of Care: 45 minutes Greater than 50% of this total time spent on direct patient counseling and coordination of care  Chief Complaint:  Nausea, generalized weakness    History of Present Illness:  Gretta Plummer is a 80 y o  male with a PMH of AFib, BPH, hypertension who presents with nausea and generalized weakness x2 days  The patient states that roughly 2 days ago he began to feel very weak and nauseous    He states that over the past several days he has felt unwell lightheaded  He states he has had difficulty with ambulation at home  He has also had some falls  He denies any head strike or loss of consciousness  He reports decreased oral intake  He states he has been able to drink some water but has had no appetite he over the past several days  He denies any fevers or chills  He does report a mild productive cough in the morning  He denies any episodes of vomiting  He has had no known sick contacts  He denies any chest pain, shortness a breath  Denies any abdominal pain  Denies any diarrhea or constipation  Denies any headaches, fevers, chills  The patient's daughter did discuss with the ED provider that she has been concerned about the patient increasingly joking about suicide  She states that he has always made jokes about this  But she does report that these dose of increase in frequency  The family is concerned because the patient does have guns in the home and used to be an avid [employer]  I attempted to call the patient's daughter to discuss these concerns at more length  I was unable to get into contact with her tonight  I discussed with the patient, he denies any suicidal ideation and states that these are simply just jokes that he makes  He denies any history of self-harm or suicide attempts  Review of Systems:  Review of Systems   Constitutional: Positive for appetite change and fatigue  Negative for chills and fever  HENT: Negative for ear pain and sore throat  Eyes: Negative for pain and visual disturbance  Respiratory: Positive for cough  Negative for shortness of breath and wheezing  Cardiovascular: Negative for chest pain, palpitations and leg swelling  Gastrointestinal: Positive for nausea  Negative for abdominal pain, blood in stool, constipation and vomiting  Endocrine: Negative for polydipsia and polyuria  Genitourinary: Negative for decreased urine volume, dysuria, frequency, hematuria and urgency     Musculoskeletal: Negative for arthralgias and back pain  Skin: Negative for color change and rash  Neurological: Positive for weakness  Negative for dizziness, seizures, syncope, light-headedness and headaches  Psychiatric/Behavioral: Negative for agitation and confusion  All other systems reviewed and are negative  Past Medical and Surgical History:   Past Medical History:   Diagnosis Date    Arthritis     Atrial fibrillation (HCC)     Last Assessed:8/10/17    Benign prostatic hyperplasia     Last Assessed:1/28/14    BPH (benign prostatic hyperplasia)     Complete atrioventricular block (HCC)     Diverticulosis     Last Assessed:4/30/13    Hypercholesterolemia     Hypertension     Paroxysmal ventricular tachycardia (HCC)     Last Assessed:7/20/17    Prostatitis     Last Assessed:12/19/12    Pulmonary artery hypertension (HCC)     mild pulmonary htn    Retention, urine     Right bundle branch block     Last Assessed:11/26/13    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     Last THBRVRDRQ:33/4/18       Past Surgical History:   Procedure Laterality Date    CARDIAC PACEMAKER PLACEMENT      [facility] DC PPM    COLONOSCOPY      HEMORRHOID SURGERY      Cauterization of hemorrhoids    HERNIA REPAIR      INCISION AND DRAINAGE ABSCESS / HEMATOMA OF BURSA / KNEE / THIGH      Fatty Tumor Removed    KNEE SURGERY Left     Steel Removes from left knee       Meds/Allergies:  Prior to Admission medications    Medication Sig Start Date End Date Taking?  Authorizing Provider   Ascorbic Acid (VITAMIN C) 1000 MG tablet Take 1 tablet by mouth daily    Historical Provider, MD   aspirin 325 mg tablet Take 1 tablet by mouth daily    Historical Provider, MD   Cholecalciferol (VITAMIN D3) 1000 units CAPS Take 1 tablet by mouth daily    Historical Provider, MD   cyanocobalamin (VITAMIN B-12) 100 mcg tablet Take 1 tablet by mouth daily    Historical Provider, MD   finasteride (PROSCAR) 5 mg tablet TAKE 1 TABLET BY MOUTH EVERY DAY 21   Bhupinder Matthews DO   metoprolol succinate (TOPROL-XL) 50 mg 24 hr tablet TAKE 1 5 TABLET DAILY 21   Bhupinder Matthews DO   Multiple Vitamins-Minerals (ICAPS AREDS 2 PO) Take by mouth  Patient not taking: Reported on 2022     Historical Provider, MD   neomycin-polymyxin-hydrocortisone (CORTISPORIN) otic solution Administer 3 drops to the right ear 2 (two) times a day  Patient not taking: Reported on 2022   Bhupinder Matthews DO   Red Yeast Rice 600 MG TABS Take by mouth daily    Historical Provider, MD   tamsulosin (FLOMAX) 0 4 mg TAKE 1 CAPSULE BY MOUTH EVERY DAY 21   Bhupinder Matthews DO     I have reviewed home medications with patient personally  Allergies: Allergies   Allergen Reactions    Ibuprofen Hives       Social History:  Marital Status:     Occupation: retired   Patient Pre-hospital Living Situation: Home  Patient Pre-hospital Level of Mobility: walks  Patient Pre-hospital Diet Restrictions: none   Substance Use History:   Social History     Substance and Sexual Activity   Alcohol Use Not Currently    Alcohol/week: 0 0 standard drinks    Comment: social drinker     Social History     Tobacco Use   Smoking Status Former Smoker    Quit date:     Years since quittin 3   Smokeless Tobacco Never Used     Social History     Substance and Sexual Activity   Drug Use No       Family History:  Family History   Problem Relation Age of Onset    Hypertension Mother     Stroke Mother         Stroke Syndrome    Other Sister         pacemaker placement    Prostate cancer Brother     Diabetes Family        Physical Exam:     Vitals:   Blood Pressure: 152/63 (04/10/22 2043)  Pulse: 60 (04/10/22 2121)  Temperature: 98 4 °F (36 9 °C) (04/10/22 1802)  Temp Source: Temporal (04/10/22 1802)  Respirations: 18 (04/10/22 2121)  Height: 5' 4" (162 6 cm) (04/10/22 2121)  Weight - Scale: 87 3 kg (192 lb 7 4 oz) (04/10/22 2121)  SpO2: 96 % (04/10/22 2121)    Physical Exam  Vitals and nursing note reviewed  Constitutional:       General: He is not in acute distress  Appearance: Normal appearance  He is well-developed  He is not ill-appearing  HENT:      Head: Normocephalic and atraumatic  Right Ear: Decreased hearing noted  Left Ear: Decreased hearing noted  Nose: Nose normal       Mouth/Throat:      Mouth: Mucous membranes are dry  Pharynx: Oropharynx is clear  No oropharyngeal exudate or posterior oropharyngeal erythema  Eyes:      General: No scleral icterus  Right eye: No discharge  Left eye: No discharge  Extraocular Movements: Extraocular movements intact  Conjunctiva/sclera: Conjunctivae normal       Pupils: Pupils are equal, round, and reactive to light  Cardiovascular:      Rate and Rhythm: Normal rate and regular rhythm  Pulses: Normal pulses  Heart sounds: Normal heart sounds  No murmur heard  No friction rub  No gallop  Pulmonary:      Effort: Pulmonary effort is normal  No respiratory distress  Breath sounds: Normal breath sounds  No wheezing, rhonchi or rales  Abdominal:      General: Abdomen is flat  Bowel sounds are normal  There is no distension  Palpations: Abdomen is soft  Tenderness: There is no abdominal tenderness  There is no guarding  Musculoskeletal:      Cervical back: Normal range of motion and neck supple  No rigidity  Right lower leg: No edema  Left lower leg: No edema  Skin:     General: Skin is warm and dry  Capillary Refill: Capillary refill takes 2 to 3 seconds  Neurological:      General: No focal deficit present  Mental Status: He is alert and oriented to person, place, and time  Mental status is at baseline  Cranial Nerves: No cranial nerve deficit  Sensory: No sensory deficit  Motor: No weakness     Psychiatric:         Mood and Affect: Mood normal          Behavior: Behavior normal           Additional Data:     Lab Results:  Results from last 7 days   Lab Units 04/10/22  1835   WBC Thousand/uL 24 81*   HEMOGLOBIN g/dL 12 4   HEMATOCRIT % 37 4   PLATELETS Thousands/uL 148*   BANDS PCT % 7   LYMPHO PCT % 1*   MONO PCT % 5   EOS PCT % 0     Results from last 7 days   Lab Units 04/10/22  1835   SODIUM mmol/L 132*   POTASSIUM mmol/L 4 1   CHLORIDE mmol/L 97*   CO2 mmol/L 24   BUN mg/dL 32*   CREATININE mg/dL 1 28   ANION GAP mmol/L 11   CALCIUM mg/dL 8 9   ALBUMIN g/dL 3 5   TOTAL BILIRUBIN mg/dL 2 15*   ALK PHOS U/L 62   ALT U/L 24   AST U/L 21   GLUCOSE RANDOM mg/dL 140     Results from last 7 days   Lab Units 04/10/22  1835   INR  1 42*             Results from last 7 days   Lab Units 04/10/22  1835   LACTIC ACID mmol/L 2 2*   PROCALCITONIN ng/ml 0 37*       Imaging: Reviewed radiology reports from this admission including: chest xray and abdominal/pelvic CT  CT abdomen pelvis with contrast   Final Result by Celsa Benoit MD (04/10 1948)      1  No acute gastrointestinal findings  2   There is partially visualized dense masslike consolidation in the left lower lobe posteriorly suspicious for pneumonia  Recommend follow-up chest CT in 4-6 weeks to ensure resolution  3   There are a few hyperdense nodular foci in the liver, not definitely seen on the prior exam   While these could represent small flash filling hemangiomas, further evaluation is recommended with dedicated MRI with and without IV contrast if feasible    vs triple phase CT of the liver  The study was marked in [facility] for immediate notification  Workstation performed: [id number]         XR chest 1 view portable   ED Interpretation by Von Dixon MD (04/10 1902)   Possible consolidation left lower lung field          EKG and Other Studies Reviewed on Admission:   · EKG: Paced rhythm  HR 60     ** Please Note: This note has been constructed using a voice recognition system   **

## 2022-04-11 NOTE — ASSESSMENT & PLAN NOTE
· Patient has had generalized weakness, nausea, decreased appetite and increased cough over the past 2 days  · Chest x-ray and CT of the abdomen and pelvis showed left lower lobe pneumonia  · Procalcitonin elevated at 0 37  · Lactic acid 2 2  · White blood cells 24 81  · COVID/flu/RSV negative  · Most likely source for sepsis  · Urine Legionella and strep pneumonia test ordered  · Sputum cultures  · Blood cultures sent  · Ceftriaxone 1000 mg IV Q 24

## 2022-04-11 NOTE — PROGRESS NOTES
[address] [facility] [address] [place]  Progress Note Jose Ramirez 5/14/1928, 80 y o  male MRN: [id number]  Unit/Bed#: 409-01 Encounter: [id number]  Primary Care Provider: Kay Paul DO   Date and time admitted to hospital: 4/10/2022  6:02 PM    Pneumonia of left lower lobe due to infectious organism  Assessment & Plan  · Patient has had generalized weakness, nausea, decreased appetite and increased cough over the past 2 days  · Chest x-ray and CT of the abdomen and pelvis showed left lower lobe pneumonia    · Procalcitonin elevated 0 51>>>> 0 37  · Lactic acid 1 6>>>> 2 2  · White blood cells 21>>>> 24 81    · COVID/flu/RSV negative  · Most likely source for sepsis  · Urine Legionella and strep pneumonia test negative    · Modify and titrate oxygen therapy based on patient's needs and guideline  Currently maintained on 1 5 liter/minute by nasal cannula  · Follow-up Sputum, urine, blood cultures  · Continue ceftriaxone day 2   · Respiratory protocol  · Incentive spirometry  · Encourage oral diet  · Monitor I/OS  · A m  labs  * Sepsis without acute organ dysfunction (HCC)  Assessment & Plan  · Upon admission, patient met sepsis criteria for leukocytosis with white blood cells 24 81 and elevated lactate of 2 2, and altered mental status  · Active source of infection identified as left lower lobe pneumonia    CT abdomen pelvis:  Left lower lobe consolidation may resemble pneumonia  Procal:  0 51>>>>> 0 37  La: 1 4>>>2 2    Urine Legionella and strep:  Negative  MRSA, COVID, flu:  Negative    · Currently maintained on oxygen therapy with 1 5 liters/minute via nasal cannula  No home oxygen needs  · Respiratory protocol  Modify oxygen therapy per guideline  Encourage incentive spirometry  · Continue trend procal, CBC, CMP  · Follow-up blood, urine, and sputum cultures  · Continue ceftriaxone day 2     · PT OT    Permanent atrial fibrillation [facility])  Assessment & Plan  This is a chronic condition  Controlled on the following medications  -follows up regularly with cardiology at [facility]   -pacemaker in place  · Patient currently rate controlled with metoprolol succinate 24 hour tablets 75 mg oral daily  · Rate is currently well controlled at 60  · Patient takes aspirin 325 mg daily does not use any other anticoagulation     ·  continue aspirin 325 mg daily  ·  continue to monitor rate with routine vitals checks    Suicidal ideation-resolved as of 4/11/2022  Assessment & Plan  · Patient's daughter and granddaughter expressed  to the ED provider that they are concern for suicidal ideation in the patient  They state that he passively makes jokes about committing suicide  They state that these have increased in frequency as of recent  Patient's granddaughter states that she is concerned since the patient does have firearms in the home and was formerly an avid [employer]  I attempted to get into contact with the patient's daughter but was unable to  The patient denies any suicidal ideation states that these are simply jokes  · Follow-up note per the medicine team; 4/11  Patient notes when he is sick he experiences suicidal thoughts  Today he does not have any active suicidal thoughts or depression signs or symptoms  He does not have any history of suicidal attempts  No history of major depressive disorder or major psychiatric illness  He appears in stable condition  Agree with the  Psychiatric team Recs    · No active suicidal thoughts, or depression noted  · No need for psychiatric outpatient follow-up, or behavior health rehab/admission  · Recommended removal of firearms from the house  · Follow-up with primary care physician accordingly  Hyponatremia  Assessment & Plan  · Patient had sodium 133 today  This is below his baseline seen on past labs  · Most likely etiology is dehydration due to decreased p o   Intake  · Patient placed on isolyte 75 mL/hr IV fluids  · Monitor a m  Metabolic panel    Benign prostatic hyperplasia  Assessment & Plan  This is a chronic condition  Stable      · At present patient does not have any urinary complaints  · Patient currently takes Flomax and Proscar at home  · Will continue this inpatient  · Monitor for urinary retention    VTE Pharmacologic Prophylaxis:   Pharmacologic: Enoxaparin (Lovenox)  Mechanical VTE Prophylaxis in Place: Yes    Patient Centered Rounds: I have performed bedside rounds with nursing staff today  Discussions with Specialists or Other Care Team Provider:  Case management    Education and Discussions with Family / Patient:  Patient    Time Spent for Care: 30 minutes  More than 50% of total time spent on counseling and coordination of care as described above  Current Length of Stay: 1 day(s)    Current Patient Status: Inpatient   Certification Statement: The patient will continue to require additional inpatient hospital stay due to Ongoing medical management  Code Status: Level 1 - Full Code      Subjective:   Patient was seen today at bedside  He does not have any active complaints  He reports that he is at his normal state in terms of alertness and activity  Denies any fatigue or decreased appetite today  No shortness of breath, chest pain or tightness, nausea or vomiting, diarrhea or constipation  Tolerating oral diet  Stooling and voiding accordingly  The patient does not have history of home oxygen use  He is currently maintained on 1 5 liters/minute via nasal cannula, will monitor and modify based on guidelines  Objective:   Not in acute distress, alert oriented x3  Vitals:   Temp (24hrs), Av 2 °F (36 8 °C), Min:98 2 °F (36 8 °C), Max:98 2 °F (36 8 °C)    Temp:  [98 2 °F (36 8 °C)] 98 2 °F (36 8 °C)  HR:  [60-62] 60  Resp:  [18-22] 18  BP: (132-152)/(57-83) 142/57  SpO2:  [86 %-96 %] 86 %  Body mass index is 33 04 kg/m²  Input and Output Summary (last 24 hours):        Intake/Output Summary (Last 24 hours) at 4/11/2022 1818  Last data filed at 4/11/2022 1552  Gross per 24 hour   Intake 1676 25 ml   Output 475 ml   Net 1201 25 ml       Physical Exam:     Physical Exam  Vitals and nursing note reviewed  Constitutional:       Appearance: Normal appearance  He is normal weight  Interventions: He is not intubated  HENT:      Head: Normocephalic and atraumatic  Nose: Nose normal  No congestion  Mouth/Throat:      Mouth: Mucous membranes are moist       Pharynx: Oropharynx is clear  No oropharyngeal exudate  Eyes:      Conjunctiva/sclera: Conjunctivae normal       Pupils: Pupils are equal, round, and reactive to light  Cardiovascular:      Rate and Rhythm: Normal rate and regular rhythm  Pulses: Normal pulses  Carotid pulses are 2+ on the right side and 2+ on the left side  Radial pulses are 2+ on the left side  Dorsalis pedis pulses are 2+ on the right side and 2+ on the left side  Heart sounds: Normal heart sounds  No murmur heard  Pulmonary:      Effort: Pulmonary effort is normal  No tachypnea, bradypnea, accessory muscle usage, prolonged expiration, respiratory distress or retractions  He is not intubated  Breath sounds: Normal air entry  Transmitted upper airway sounds present  No stridor or decreased air movement  Examination of the left-lower field reveals rales  Rales (biphasic) present  No decreased breath sounds, wheezing or rhonchi  Abdominal:      General: Abdomen is flat  Bowel sounds are normal  There is no distension  Palpations: Abdomen is soft  Tenderness: There is no abdominal tenderness  Musculoskeletal:      Right lower leg: No edema  Left lower leg: No edema  Skin:     Capillary Refill: Capillary refill takes less than 2 seconds  Neurological:      General: No focal deficit present  Mental Status: He is alert  Mental status is at baseline           Additional Data:     Labs:    Results from last 7 days   Lab Units 04/11/22  0450 04/10/22  2310 04/10/22  1835   WBC Thousand/uL 21 22*  --  24 81*   HEMOGLOBIN g/dL 10 7*  --  12 4   HEMATOCRIT % 32 2*  --  37 4   PLATELETS Thousands/uL 126*   < > 148*   BANDS PCT %  --   --  7   NEUTROS PCT % 89*  --   --    LYMPHS PCT % 3*  --   --    LYMPHO PCT %  --   --  1*   MONOS PCT % 6  --   --    MONO PCT %  --   --  5   EOS PCT % 0  --  0    < > = values in this interval not displayed  Results from last 7 days   Lab Units 04/11/22  0450   SODIUM mmol/L 133*   POTASSIUM mmol/L 4 0   CHLORIDE mmol/L 100   CO2 mmol/L 24   BUN mg/dL 30*   CREATININE mg/dL 1 14   ANION GAP mmol/L 9   CALCIUM mg/dL 8 1*   ALBUMIN g/dL 2 8*   TOTAL BILIRUBIN mg/dL 1 43*   ALK PHOS U/L 51   ALT U/L 23   AST U/L 23   GLUCOSE RANDOM mg/dL 115     Results from last 7 days   Lab Units 04/10/22  1835   INR  1 42*             Results from last 7 days   Lab Units 04/11/22  0450 04/10/22  2119 04/10/22  1835   LACTIC ACID mmol/L  --  1 4 2 2*   PROCALCITONIN ng/ml 0 51*  --  0 37*           * I Have Reviewed All Lab Data Listed Above  * Additional Pertinent Lab Tests Reviewed: Timothy 66 Admission Reviewed    Imaging:    Imaging Reports Reviewed Today Include:  CT abdomen pelvis with contrast   Final Result      1  No acute gastrointestinal findings  2   There is partially visualized dense masslike consolidation in the left lower lobe posteriorly suspicious for pneumonia  Recommend follow-up chest CT in 4-6 weeks to ensure resolution  3   There are a few hyperdense nodular foci in the liver, not definitely seen on the prior exam   While these could represent small flash filling hemangiomas, further evaluation is recommended with dedicated MRI with and without IV contrast if feasible    vs triple phase CT of the liver  The study was marked in [facility] for immediate notification           Workstation performed: [id number]         XR chest 1 view portable   ED Interpretation   Possible consolidation left lower lung field      Final Result   Mild cardiomegaly      No acute cardiopulmonary disease  Workstation performed: [id number]               Recent Cultures (last 7 days):     Results from last 7 days   Lab Units 04/10/22  2111 04/10/22  1835   BLOOD CULTURE   --  Received in Microbiology Lab  Culture in Progress  Received in Microbiology Lab  Culture in Progress  LEGIONELLA URINARY ANTIGEN  Negative  --        Last 24 Hours Medication List:   Current Facility-Administered Medications   Medication Dose Route Frequency Provider Last Rate    aspirin  325 mg Oral Daily Montine Salvador, PA-C      cefTRIAXone  1,000 mg Intravenous Q24H Montine Salvador, PA-C      cholecalciferol  1,000 Units Oral Daily Montine Salvador, PA-C      cyanocobalamin  100 mcg Oral Daily Montine Salvador, PA-C      enoxaparin  40 mg Subcutaneous Daily Montine Salvador, PA-C      finasteride  5 mg Oral Daily Montine Salvador, PA-C      guaiFENesin  600 mg Oral BID Montine Salvador, PA-C      metoprolol succinate  75 mg Oral Daily Montine Salvador, PA-C      ondansetron  4 mg Intravenous Q6H PRN Montine Salvador, PA-C      tamsulosin  0 4 mg Oral Daily Montine Salvador, PA-C          Today, Patient Was Seen By: Carina Mcdermott MD    ** Please Note: Dictation voice to text software may have been used in the creation of this document   **

## 2022-04-11 NOTE — ASSESSMENT & PLAN NOTE
· Upon admission, patient met sepsis criteria for leukocytosis with white blood cells 24 81 elevated lactate of 2 2  · Most likely source is left lower lobe pneumonia    CT abdomen pelvis:  Left lower lobe consolidation may resemble pneumonia  Procal:  0 51>>>>> 0 37  La: 1 4>>>2 2    Urine Legionella and strep:  Negative  MRSA, COVID, flu:  Negative    · Currently maintained on oxygen therapy with 1 5 liters/minute via nasal cannula  No home oxygen needs  · Respiratory protocol  Modify oxygen therapy per guideline  Encourage incentive spirometry  · Continue trend procal, CBC, CMP  · Follow-up blood, urine, and sputum cultures  · Continue ceftriaxone day 2     · PT OT

## 2022-04-11 NOTE — ASSESSMENT & PLAN NOTE
This is a chronic condition  Controlled on the following medications  -follows up regularly with cardiology at [facility]   -pacemaker in place      · Patient currently rate controlled with metoprolol succinate 24 hour tablets 75 mg oral daily  · Rate is currently well controlled at 60  · Patient takes aspirin 325 mg daily does not use any other anticoagulation     ·  continue aspirin 325 mg daily  ·  continue to monitor rate with routine vitals checks

## 2022-04-11 NOTE — CASE MANAGEMENT
Case Management Assessment & Discharge Planning Note    Patient name Patti Arellano  Location [address] [id number] MRN [id number]  : 1928 Date 2022       Current Admission Date: 4/10/2022  Current Admission Diagnosis:Sepsis without acute organ dysfunction [facility])   Patient Active Problem List    Diagnosis Date Noted    Pneumonia of left lower lobe due to infectious organism 04/10/2022    Suicidal ideation 04/10/2022    Sepsis without acute organ dysfunction ([facility] [address] ) 04/10/2022    Hyponatremia 04/10/2022    [facility]lets [facility] 2021    Constipation 2021    Permanent [facility] 2019    Presence of permanent cardiac pacemaker 2019    Medicare annual wellness visit, subsequent 2019    Pulmonary artery hypertension ([facility] ) 2013    RBBB 2013    Diverticulosis 2013    Benign prostatic hyperplasia 2012    Hypercholesterolemia 2012      LOS (days): 1  Geometric Mean LOS (GMLOS) (days): 4 80  Days to GMLOS:4 2     OBJECTIVE:    Risk of Unplanned Readmission Score: 18         Current admission status: Inpatient       Preferred Pharmacy:   [address] [facility], [address] [place], [place] [address]  Phone: [phone] Fax: [phone]    Primary Care Provider: Elizabeth Degroot DO    Primary Insurance: MEDICARE  Secondary Insurance: Megan Perez    ASSESSMENT:  [address] [facility], [address], [address] - Daughter   Primary Phone: [phone] (Home)  Mobile Phone: [phone]               Advance Directives  Does patient have a [address]?: Yes  Does patient have Advance Directives?: Yes  Advance Directives: Living will,Power of  for health care (copies requested)  Primary Contact: Dipti Negreteores [phone]         Readmission Root Cause  30 Day Readmission: No    Patient Information  Admitted from[de-identified] Home  Mental Status: Alert  During Assessment patient was accompanied by: Not accompanied during assessment  Assessment information provided by[de-identified] Patient,Daughter  Primary Caregiver: Self  Support Systems: Daughter  [place] of Residence: [address] do you live in?: [address] entry access options  Select all that apply : Stairs  Number of steps to enter home : 7 (4+3)  Type of Current Residence: 2 story home  Upon entering residence, is there a bedroom on the main floor (no further steps)?: No  A bedroom is located on the following floor levels of residence (select all that apply):: 2nd Floor  Upon entering residence, is there a bathroom on the main floor (no further steps)?: Yes (full bath on 1st and 2nd floor)  Number of steps to 2nd floor from main floor: One Flight  In the last 12 months, was there a time when you were not able to pay the mortgage or rent on time?: No  In the last 12 months, was there a time when you did not have a steady place to sleep or slept in a shelter (including now)?: No  Homeless/housing insecurity resource given?: N/A  Living Arrangements: Lives Alone    Activities of Daily Living Prior to Admission  Functional Status: Independent  Completes ADLs independently?: Yes  Ambulates independently?: Yes  Does patient use assisted devices?: Yes  Assisted Devices (DME) used:  Other (Comment) (pt uses his walking stick)  Does patient currently own DME?: Yes  What DME does the patient currently own?: Straight Cane,Other (Comment) (3 prong cane and a SPC)  Does patient have a history of Outpatient Therapy (PT/OT)?: No  Does the patient have a history of Short-Term Rehab?: No  Does patient have a history of HHC?: No  Does patient currently have [facility]?: No         Patient Information Continued  Does patient have prescription coverage?: Yes  Within the past 12 months, you worried that your food would run out before you got the money to buy more : Never true  Within the past 12 months, the food you bought just didnt last and you didnt have money to get more : Never true  Food insecurity resource given?: N/A  Does patient receive dialysis treatments?: No  Does patient have a history of substance abuse?: No  Does patient have a history of Mental Health Diagnosis?: No         Means of Transportation  Means of Transport to Appts[de-identified] Drives Self (pt drives locally; daughter drives distances)  In the past 12 months, has lack of transportation kept you from medical appointments or from getting medications?: No  In the past 12 months, has lack of transportation kept you from meetings, work, or from getting things needed for daily living?: No  Was application for public transport provided?: N/A        DISCHARGE DETAILS:    Discharge planning discussed with[de-identified] patient and daughter:Krysten Torres CM contacted family/caregiver?: Yes  Were Treatment Team discharge recommendations reviewed with patient/caregiver?: Yes  Did patient/caregiver verbalize understanding of patient care needs?: Yes  Were patient/caregiver advised of the risks associated with not following Treatment Team discharge recommendations?: Yes    Contacts  Patient Contacts: Mika Leigh  Relationship to Patient[de-identified] Family  Contact Method: Phone  Phone Number: [phone] /[phone]  Reason/Outcome: Discharge Planning    Discharge Destination Plan[de-identified] Home  Transport at Discharge : Family(daughter)   Plans at this time are home on dc with OP follow up  CM will follow and assist in dc planning

## 2022-04-11 NOTE — PLAN OF CARE
Problem: Potential for Falls  Goal: Patient will remain free of falls  Description: INTERVENTIONS:  - Educate patient/family on patient safety including physical limitations  - Instruct patient to call for assistance with activity   - Consult OT/PT to assist with strengthening/mobility   - Keep Call bell within reach  - Keep bed low and locked with side rails adjusted as appropriate  - Keep care items and personal belongings within reach  - Initiate and maintain comfort rounds  - Make Fall Risk Sign visible to staff  - Offer Toileting every two  Hours, in advance of need  - Initiate/Maintain bed alarm  - Obtain necessary fall risk management equipment: non   - Apply yellow socks and bracelet for high fall risk patients  - Consider moving patient to room near nurses station  Outcome: Progressing     Problem: PAIN - ADULT  Goal: Verbalizes/displays adequate comfort level or baseline comfort level  Description: Interventions:  - Encourage patient to monitor pain and request assistance  - Assess pain using appropriate pain scale  - Administer analgesics based on type and severity of pain and evaluate response  - Implement non-pharmacological measures as appropriate and evaluate response  - Consider cultural and social influences on pain and pain management  - Notify physician/advanced practitioner if interventions unsuccessful or patient reports new pain  Outcome: Progressing     Problem: INFECTION - ADULT  Goal: Absence or prevention of progression during hospitalization  Description: INTERVENTIONS:  - Assess and monitor for signs and symptoms of infection  - Monitor lab/diagnostic results  - Monitor all insertion sites, i e  indwelling lines, tubes, and drains  - Orient appropriate cooling/warming therapies per order  - Administer medications as ordered  - Instruct and encourage patient and family to use good hand hygiene technique  - Identify and instruct in appropriate isolation precautions for identified infection/condition  Outcome: Progressing     Problem: SAFETY ADULT  Goal: Patient will remain free of falls  Description: INTERVENTIONS:  - Educate patient/family on patient safety including physical limitations  - Instruct patient to call for assistance with activity   - Consult OT/PT to assist with strengthening/mobility   - Keep Call bell within reach  - Keep bed low and locked with side rails adjusted as appropriate  - Keep care items and personal belongings within reach  - Initiate and maintain comfort rounds  - Make Fall Risk Sign visible to staff  - Offer Toileting every two  Hours, in advance of need  - Initiate/Maintain bed alarm  - Obtain necessary fall risk management equipment: non   - Apply yellow socks and bracelet for high fall risk patients  - Consider moving patient to room near nurses station  Outcome: Progressing  Goal: Maintain or return to baseline ADL function  Description: INTERVENTIONS:  -  Assess patient's ability to carry out ADLs; assess patient's baseline for ADL function and identify physical deficits which impact ability to perform ADLs (bathing, care of mouth/teeth, toileting, grooming, dressing, etc )  - Assess/evaluate cause of self-care deficits   - Assess range of motion  - Assess patient's mobility; develop plan if impaired  - Assess patient's need for assistive devices and provide as appropriate  - Encourage maximum independence but intervene and supervise when necessary  - Involve family in performance of ADLs  - Assess for home care needs following discharge   - Consider OT consult to assist with ADL evaluation and planning for discharge  - Provide patient education as appropriate  Outcome: Progressing  Goal: Maintains/Returns to pre admission functional level  Description: INTERVENTIONS:  - Perform BMAT or MOVE assessment daily    - Set and communicate daily mobility goal to care team and patient/family/caregiver     - Collaborate with rehabilitation services on mobility goals if consulted  - Perform Range of Motion three times a day  - Stand patient three times a day  - Ambulate patient three times a day  - Out of bed to chair three times a day   - Out of bed for meals three times a day  - Out of bed for toileting  - Record patient progress and toleration of activity level   Outcome: Progressing     Problem: DISCHARGE PLANNING  Goal: Discharge to home or other facility with appropriate resources  Description: INTERVENTIONS:  - Identify barriers to discharge w/patient and caregiver  - Arrange for needed discharge resources and transportation as appropriate  - Identify discharge learning needs (meds, wound care, etc )  - Arrange for interpretive services to assist at discharge as needed  - Refer to Case Management Department for coordinating discharge planning if the patient needs post-hospital services based on physician/advanced practitioner order or complex needs related to functional status, cognitive ability, or social support system  Outcome: Progressing     Problem: Knowledge Deficit  Goal: Patient/family/caregiver demonstrates understanding of disease process, treatment plan, medications, and discharge instructions  Description: Complete learning assessment and assess knowledge base    Interventions:  - Provide teaching at level of understanding  - Provide teaching via preferred learning methods  Outcome: Progressing     Problem: RESPIRATORY - ADULT  Goal: Achieves optimal ventilation and oxygenation  Description: INTERVENTIONS:  - Assess for changes in respiratory status  - Assess for changes in mentation and behavior  - Position to facilitate oxygenation and minimize respiratory effort  - Oxygen administered by appropriate delivery if ordered  - Initiate smoking cessation education as indicated  - Encourage broncho-pulmonary hygiene including cough, deep breathe, Incentive Spirometry  - Assess the need for suctioning and aspirate as needed  - Assess and instruct to report SOB or any respiratory difficulty  - Respiratory Therapy support as indicated  Outcome: Progressing     Problem: MOBILITY - ADULT  Goal: Maintain or return to baseline ADL function  Description: INTERVENTIONS:  -  Assess patient's ability to carry out ADLs; assess patient's baseline for ADL function and identify physical deficits which impact ability to perform ADLs (bathing, care of mouth/teeth, toileting, grooming, dressing, etc )  - Assess/evaluate cause of self-care deficits   - Assess range of motion  - Assess patient's mobility; develop plan if impaired  - Assess patient's need for assistive devices and provide as appropriate  - Encourage maximum independence but intervene and supervise when necessary  - Involve family in performance of ADLs  - Assess for home care needs following discharge   - Consider OT consult to assist with ADL evaluation and planning for discharge  - Provide patient education as appropriate  Outcome: Progressing  Goal: Maintains/Returns to pre admission functional level  Description: INTERVENTIONS:  - Perform BMAT or MOVE assessment daily    - Set and communicate daily mobility goal to care team and patient/family/caregiver  - Collaborate with rehabilitation services on mobility goals if consulted  - Perform Range of Motion three times a day    - Stand patient three times a day  - Ambulate patient three times a day  - Out of bed to chair three times a day   - Out of bed for meals three times a day  - Out of bed for toileting  - Record patient progress and toleration of activity level   Outcome: Progressing

## 2022-04-11 NOTE — ASSESSMENT & PLAN NOTE
· At present patient does not have any urinary complaints  · Patient currently takes Flomax and Proscar at home  · Will continue this inpatient  · Monitor for urinary retention

## 2022-04-12 LAB
ANISOCYTOSIS BLD QL SMEAR: PRESENT
BACTERIA SPT RESP CULT: ABNORMAL
BASOPHILS # BLD MANUAL: 0 THOUSAND/UL (ref 0–0.1)
BASOPHILS NFR MAR MANUAL: 0 % (ref 0–1)
EOSINOPHIL # BLD MANUAL: 0 THOUSAND/UL (ref 0–0.4)
EOSINOPHIL NFR BLD MANUAL: 0 % (ref 0–6)
ERYTHROCYTE [DISTWIDTH] IN BLOOD BY AUTOMATED COUNT: 13.7 % (ref 11.6–15.1)
GRAM STN SPEC: ABNORMAL
HCT VFR BLD AUTO: 31.5 % (ref 36.5–49.3)
HGB BLD-MCNC: 10.6 G/DL (ref 12–17)
LYMPHOCYTES # BLD AUTO: 0.91 THOUSAND/UL (ref 0.6–4.47)
LYMPHOCYTES # BLD AUTO: 4 % (ref 14–44)
MCH RBC QN AUTO: 32.3 PG (ref 26.8–34.3)
MCHC RBC AUTO-ENTMCNC: 33.7 G/DL (ref 31.4–37.4)
MCV RBC AUTO: 96 FL (ref 82–98)
MONOCYTES # BLD AUTO: 0.91 THOUSAND/UL (ref 0–1.22)
MONOCYTES NFR BLD: 4 % (ref 4–12)
NEUTROPHILS # BLD MANUAL: 20.89 THOUSAND/UL (ref 1.85–7.62)
NEUTS BAND NFR BLD MANUAL: 5 % (ref 0–8)
NEUTS SEG NFR BLD AUTO: 87 % (ref 43–75)
PLATELET # BLD AUTO: 134 THOUSANDS/UL (ref 149–390)
PLATELET BLD QL SMEAR: ABNORMAL
PMV BLD AUTO: 11.1 FL (ref 8.9–12.7)
PROCALCITONIN SERPL-MCNC: 0.7 NG/ML
RBC # BLD AUTO: 3.28 MILLION/UL (ref 3.88–5.62)
WBC # BLD AUTO: 22.71 THOUSAND/UL (ref 4.31–10.16)

## 2022-04-12 PROCEDURE — 97163 PT EVAL HIGH COMPLEX 45 MIN: CPT

## 2022-04-12 PROCEDURE — 99232 SBSQ HOSP IP/OBS MODERATE 35: CPT | Performed by: INTERNAL MEDICINE

## 2022-04-12 PROCEDURE — 85007 BL SMEAR W/DIFF WBC COUNT: CPT

## 2022-04-12 PROCEDURE — 97167 OT EVAL HIGH COMPLEX 60 MIN: CPT

## 2022-04-12 PROCEDURE — 84145 PROCALCITONIN (PCT): CPT

## 2022-04-12 PROCEDURE — 85027 COMPLETE CBC AUTOMATED: CPT

## 2022-04-12 RX ADMIN — METOPROLOL SUCCINATE 75 MG: 50 TABLET, EXTENDED RELEASE ORAL at 09:00

## 2022-04-12 RX ADMIN — VITAM B12 100 MCG: 100 TAB at 09:00

## 2022-04-12 RX ADMIN — GUAIFENESIN 600 MG: 600 TABLET ORAL at 08:59

## 2022-04-12 RX ADMIN — TAMSULOSIN HYDROCHLORIDE 0.4 MG: 0.4 CAPSULE ORAL at 09:00

## 2022-04-12 RX ADMIN — FINASTERIDE 5 MG: 5 TABLET, FILM COATED ORAL at 08:59

## 2022-04-12 RX ADMIN — Medication 1000 UNITS: at 09:00

## 2022-04-12 RX ADMIN — ASPIRIN 325 MG ORAL TABLET 325 MG: 325 PILL ORAL at 09:00

## 2022-04-12 RX ADMIN — ENOXAPARIN SODIUM 40 MG: 40 INJECTION SUBCUTANEOUS at 09:00

## 2022-04-12 RX ADMIN — CEFTRIAXONE 1000 MG: 1 INJECTION, SOLUTION INTRAVENOUS at 20:18

## 2022-04-12 RX ADMIN — GUAIFENESIN 600 MG: 600 TABLET ORAL at 17:28

## 2022-04-12 NOTE — OCCUPATIONAL THERAPY NOTE
Occupational Therapy Evaluation     Patient Name: Pelon Kan  YAIYB'U Date: 4/12/2022  Problem List  Principal Problem:    Sepsis without acute organ dysfunction [facility])  Active Problems:    Benign prostatic hyperplasia    Permanent atrial fibrillation (HCC)    Pneumonia of left lower lobe due to infectious organism    Hyponatremia    Past Medical History  Past Medical History:   Diagnosis Date    Arthritis     Atrial fibrillation (HCC)     Last Assessed:8/10/17    Benign prostatic hyperplasia     Last Assessed:1/28/14    BPH (benign prostatic hyperplasia)     Complete atrioventricular block (HCC)     Diverticulosis     Last Assessed:4/30/13    Hypercholesterolemia     Hypertension     Paroxysmal ventricular tachycardia (HCC)     Last Assessed:7/20/17    Prostatitis     Last Assessed:12/19/12    Pulmonary artery hypertension (HCC)     mild pulmonary htn    Retention, urine     Right bundle branch block     Last Assessed:11/26/13    Suicidal ideation 4/10/2022    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     Last GATRPLZRE:67/2/05     Past Surgical History  Past Surgical History:   Procedure Laterality Date    CARDIAC PACEMAKER PLACEMENT      St  Judes DC PPM    COLONOSCOPY      HEMORRHOID SURGERY      Cauterization of hemorrhoids    HERNIA REPAIR      INCISION AND DRAINAGE ABSCESS / HEMATOMA OF [place] / KNEE / THIGH      Fatty Tumor Removed    KNEE SURGERY Left     Steel Removes from left knee             04/12/22 1012   OT Last Visit   OT Visit Date 04/12/22   Note Type   Note type Evaluation   Restrictions/Precautions   Weight Bearing Precautions Per Order No   Other Precautions Chair Alarm;Multiple lines; Bed Alarm; Fall Risk   Pain Assessment   Pain Score No Pain   Home Living   Type of [address] [place] [address] Two level;Bed/bath upstairs;1/2 bath on main level;Stairs to enter with rails; Other (Comment)  (4 + 3 TANNER c HR; FOS to 2nd c HR)   Bathroom Shower/Tub Tub/shower unit   [employer] Standard   Bathroom Equipment Shower chair   Bathroom Accessibility Accessible   Home Equipment Cane;Grab bars   Additional Comments pt performs functional mobility with SPC at baseline    Prior Function   Level of [place] Independent with ADLs and functional mobility; Needs assistance with IADLs   Lives With Alone   Receives Help From Family   ADL Assistance Independent   IADLs Needs assistance   Falls in the last 6 months 1 to 4   Vocational Retired   Comments pt is (I) with driving short distances; pt with multiple falls at home and reports daughter is able to (A) with IADLs and long distance driving   Lifestyle   Autonomy     Psychosocial   Psychosocial (WDL) WDL   Subjective   Subjective "it is so much easier to walk with this walker"   ADL   Where Assessed Edge of bed   LB Dressing Assistance 5  Supervision/Setup   LB Dressing Deficit Don/doff R sock; Don/doff L sock   Additional Comments pt performs functional ADLs with (S); pt given pants during session and folds them and places to the side stating "I will put these on later"   Bed Mobility   Supine to Sit 5  Supervision   Additional items Bedrails; Increased time required   Sit to Supine   (seated in chair at end of session)   Additional Comments pt on 2L O2 during session; SPO2 WFL with no specific complaints of SOB   Transfers   Sit to Stand 5  Supervision   Additional items Increased time required;Verbal cues   Stand to Sit 5  Supervision   Additional items Increased time required;Verbal cues   Additional Comments pt initially utilizes "walking stick" during functional transfers with moderate instability; pt trials RW during session with increased stability   Functional Mobility   Functional Mobility 4  Minimal assistance   Additional Comments x1 with use of walking stick; x1-2 large LOB during session; pt then provided with RW during session and with increased stability for ~200ft of mobility; pt reports "wow this is much easier", requests RW for home use, agree with this statement   Additional items SPC;Rolling walker   Balance   Static Sitting Good   Dynamic Sitting Good   Static Standing Fair   Dynamic Standing Fair   Ambulatory Fair   Activity Tolerance   Activity Tolerance Patient limited by fatigue   RUE Assessment   RUE Assessment WFL   LUE Assessment   LUE Assessment WFL   Hand Function   Gross Motor Coordination Functional   Fine Motor Coordination Functional   Sensation   Light Touch No apparent deficits   Sharp/Dull No apparent deficits   Cognition   Overall Cognitive Status Impaired   Arousal/Participation Alert; Cooperative   Attention Attends with cues to redirect   Orientation Level Oriented to place;Oriented to person;Oriented to situation;Disoriented to time   Memory Decreased long term memory   Following Commands Follows one step commands with increased time or repetition   Comments pt is extremely [place] and requires spoken commands into R ear with hearing aide   Assessment   Limitation Decreased ADL status; Decreased Safe judgement during ADL;Decreased UE strength;Decreased endurance;Decreased self-care trans;Decreased high-level ADLs   Assessment Pt is a 80 y o  male seen for OT evaluation s/p admit to [facility] on 4/10/2022 w/ Sepsis without acute organ dysfunction ([facility] [address] )  Comorbidities affecting pt's functional performance at time of assessment include: arthritis, a-fib, diverticulosis, tachycardia, pulmonary artery HTN, RBBB, urine retention  Personal factors affecting pt at time of IE include:steps to enter environment, limited home support, difficulty performing ADLS, difficulty performing IADLS , limited insight into deficits and health management   Prior to admission, pt was (I) with ADLs and (A) with IADLs with use of SPC during mobility   Upon evaluation: Pt requires (S)-min (A) x1 with use of RW during mobility 2* the following deficits impacting occupational performance: weakness, decreased strength, decreased balance, decreased tolerance, impaired initiation, impaired problem solving and decreased safety awareness  Pt to benefit from continued skilled OT tx while in the hospital to address deficits as defined above and maximize level of functional independence w ADL's and functional mobility  Occupational Performance areas to address include: grooming, bathing/shower, toilet hygiene, dressing, functional mobility, community mobility and clothing management  The patient's raw score on the AM-PAC Daily Activity inpatient short form is 21, standardized score is 44 27, greater than 39 4  Patients at this level are likely to benefit from discharge to home  Please refer to the recommendation of the Occupational Therapist for safe discharge planning  Co treatment with PT secondary to complex medical condition of pt, possible A of 2 required to achieve and maintain transitional movements, requiring the need of skilled therapeutic intervention of 2 therapists to achieve delivery of services  Goals   Patient Goals to go home    Short Term Goal  pt will perform UE strengthening exercises    Long Term Goal #1 pt will demonstrate toilet transfers and hygiene at (I) level    Long Term Goal #2 pt will demonstrate functional mobility with RW at mod (I) level    Long Term Goal pt will demonstrate UB/LB bathing and grooming tasks at (I) level    Plan   Treatment Interventions ADL retraining;Functional transfer training;UE strengthening/ROM; Endurance training;Patient/family training;Equipment evaluation/education; Activityengagement   Goal Expiration Date 04/26/22   OT Frequency 3-5x/wk   Recommendation   OT Discharge Recommendation Home with home health rehabilitation   Additional Comments  recommend RW for home; case management aware and pt agrees    AM-[facility] Daily Activity Inpatient   Lower Body Dressing 3   Bathing 3   Toileting 3   Upper Body Dressing 4   Grooming 4   Eating 4   Daily Activity Raw Score 21   Daily Activity Standardized Score (Calc for Raw Score >=11) 44 27   AM-PAC Applied Cognition Inpatient   Following a Speech/Presentation 3   Understanding Ordinary Conversation 3   Taking Medications 3   Remembering Where Things Are Placed or Put Away 3   Remembering List of 4-5 Errands 3   Taking Care of Complicated Tasks 3   Applied Cognition Raw Score 18   Applied Cognition Standardized Score 38 07     Pt will benefit from continued OT services in order to maximize (I) c ADL performance, FM c RW, and improve overall endurance/strength required to complete functional tasks in preparation for d/c  Pt left seated in chair at end of session; all needs within reach; all lines intact; scds connected and turned on

## 2022-04-12 NOTE — PHYSICAL THERAPY NOTE
Physical Therapy Evaluation    Patient Name: Kyra Alegria    CMMPO'T Date: 4/12/2022     Problem List  Principal Problem:    Sepsis without acute organ dysfunction [facility])  Active Problems:    Benign prostatic hyperplasia    Permanent atrial fibrillation (HCC)    Pneumonia of left lower lobe due to infectious organism    Hyponatremia       Past Medical History  Past Medical History:   Diagnosis Date    Arthritis     Atrial fibrillation (HCC)     Last Assessed:8/10/17    Benign prostatic hyperplasia     Last Assessed:1/28/14    BPH (benign prostatic hyperplasia)     Complete atrioventricular block (HCC)     Diverticulosis     Last Assessed:4/30/13    Hypercholesterolemia     Hypertension     Paroxysmal ventricular tachycardia (HCC)     Last Assessed:7/20/17    Prostatitis     Last Assessed:12/19/12    Pulmonary artery hypertension (HCC)     mild pulmonary htn    Retention, urine     Right bundle branch block     Last Assessed:11/26/13    Suicidal ideation 4/10/2022    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     Last JIJDAQAIB:59/1/14        Past Surgical History  Past Surgical History:   Procedure Laterality Date    CARDIAC PACEMAKER PLACEMENT      St  Judes DC PPM    COLONOSCOPY      HEMORRHOID SURGERY      Cauterization of hemorrhoids    HERNIA REPAIR      INCISION AND DRAINAGE ABSCESS / HEMATOMA OF [place] / KNEE / THIGH      Fatty Tumor Removed    KNEE SURGERY Left     Steel Removes from left knee           04/12/22 1013   PT Last Visit   PT Visit Date 04/12/22   Note Type   Note type Evaluation   Pain Assessment   Pain Assessment Tool 0-10   Pain Score No Pain   Restrictions/Precautions   Weight Bearing Precautions Per Order No   Other Precautions Fall Risk;O2;Multiple lines; Bed Alarm; Chair Alarm;Cognitive;Hard of hearing   Home Living   Type of [address] [place] [address] Two level;Stairs to enter with rails;1/2 bath on main level;Bed/bath upstairs  (7 TANNER c HR)   Bathroom Shower/Tub Tub/shower unit   Bathroom Toilet Standard   Bathroom Equipment Shower chair   Bathroom Accessibility Accessible   Home Equipment Cane   Additional Comments pt reports use of cane at baseline   Prior Function   Level of [place] Independent with ADLs and functional mobility; Needs assistance with IADLs   Lives With Alone   Receives Help From Family   ADL Assistance Independent   IADLs Needs assistance   Falls in the last 6 months 1 to 4   Vocational Retired   Comments (+) driving short distances   General   Family/Caregiver Present No   Cognition   Overall Cognitive Status Impaired   Arousal/Participation Alert   Attention Attends with cues to redirect   Orientation Level Oriented to person;Oriented to place; Disoriented to time;Oriented to situation   Following Commands Follows one step commands with increased time or repetition   Comments pt extremely [place]   Subjective   Subjective "I made that cane"   RLE Assessment   RLE Assessment X  (4-/5 grossly)   LLE Assessment   LLE Assessment X  (4-/5 grossly)   Bed Mobility   Supine to Sit 5  Supervision   Additional items Increased time required;Verbal cues; Bedrails   Sit to Supine   (pt OOB at start/end of session)   Transfers   Sit to Stand 5  Supervision   Additional items Increased time required;Verbal cues   Stand to Sit 5  Supervision   Additional items Increased time required;Verbal cues   Additional Comments SPC used   Ambulation/Elevation   Gait pattern Excessively slow; Short stride; Foward flexed;Decreased foot clearance   Gait Assistance 5  Supervision  ([facility])   Additional items Verbal cues   Assistive Device Rolling walker;SPC   Distance 200'   Balance   Static Sitting Good   Dynamic Sitting Good   Static Standing Fair   Dynamic Standing [place] [place] [id number] -  (with RW)   Endurance Deficit   Endurance Deficit Yes   Activity Tolerance   Activity Tolerance Patient limited by fatigue   Assessment Prognosis Good   Problem List Decreased strength;Decreased endurance;Decreased mobility; Impaired balance;Decreased cognition;Decreased safety awareness   Assessment Patient is a 80 y o  male evaluated by Physical Therapy s/p admit to [facility] on 4/10/2022 with admitting diagnosis of: Suicidal ideation, Nausea, Pneumonia, Sepsis, and principal problem of: Sepsis without acute organ dysfunction  PT was consulted to assess patient's functional mobility and discharge needs  Ordered are PT Evaluation and treatment with activity level of: up and OOB as tolerated  Comorbidities affecting patient's physical performance at time of assessment include: arthritis, a-fib, HTN, BPH  Personal factors affecting the patient at time of IE include: lives in 2 story home, ambulating with assistive device, step(s) to enter home, inability to navigate community distances, history of fall(s), impaired safety awareness, hearing impairments and inability/difficulty performing IADLs  Please locate objective findings from PT assessment regarding body systems outlined above  Upon evaluation, pt able to perform all functional mobility with SUP, RW, and increased time  Initially, pt requesting to use his homemade cane  While ambulating with cane, pt moderately unsteady and did experience 1 major LOB which required modA to recover  Pt then instructed on RW use and was able to ambulate 200' with increased stability  Pt agreed that mobility was easier with RW and would be interested in taking one home with him upon d/c  Seated rest break taken following ambulation d/t fatigue; SpO2 and HR remained WFL on 2L O2 throughout  The patient's AM-PAC Basic Mobility Inpatient Short Form Raw Score is 17  A Raw score of greater than 16 suggests the patient may benefit from discharge to home  Please also refer to the recommendation of the Physical Therapist for safe discharge planning   Co treatment with OT secondary to complex medical condition of pt, possible A of 2 required to achieve and maintain transitional movements, requiring the need of skilled therapeutic intervention of 2 therapists to achieve delivery of services  Pt will benefit from continued PT intervention during LOS to address current deficits, increase LOF, and facilitate safe d/c to next level of care when medically appropriate  D/c recommendation at this time is home with home health rehabilitation  Goals   Patient Goals to go home   LTG Expiration Date 04/26/22   Long Term Goal #1 Pt will participate in B LE strengthening exercises to facilitate improved functional activity tolerance  Pt will perform all functional transfers and bed mobility mod(I) with good safety awareness  Pt will ambulate 250' mod(I) with LRAD while maintaining good functional dynamic balance  Pt will ascend/descend a FFOS with HR and SUP to reflect the ability to safely navigate the home  Plan   Treatment/Interventions Functional transfer training;LE strengthening/ROM; Therapeutic exercise; Endurance training;Bed mobility;Gait training;Elevations   PT Frequency 3-5x/wk   Recommendation   PT Discharge Recommendation Home with home health rehabilitation   AM-PAC Basic Mobility Inpatient   Turning in Bed Without Bedrails 3   Lying on Back to Sitting on Edge of Flat Bed 3   Moving Bed to Chair 3   Standing Up From Chair 3   Walk in Room 3   Climb 3-5 Stairs 2   Basic Mobility Inpatient Raw Score 17   Basic Mobility Standardized Score 39 67   Highest Level Of Mobility   JH-HLM Goal 5: Stand one or more mins   JH-HLM Highest Level of Mobility 7: Walk 25 feet or more   JH-HLM Goal Achieved Yes   End of Consult   Patient Position at End of Consult Bedside chair;Bed/Chair alarm activated; All needs within reach

## 2022-04-12 NOTE — PLAN OF CARE
Problem: OCCUPATIONAL THERAPY ADULT  Goal: Performs self-care activities at highest level of function for planned discharge setting  See evaluation for individualized goals  Description: Treatment Interventions: ADL retraining,Functional transfer training,UE strengthening/ROM,Endurance training,Patient/family training,Equipment evaluation/education,Activityengagement          See flowsheet documentation for full assessment, interventions and recommendations  Note: Limitation: Decreased ADL status,Decreased Safe judgement during ADL,Decreased UE strength,Decreased endurance,Decreased self-care trans,Decreased high-level ADLs     Assessment: Pt is a 80 y o  male seen for OT evaluation s/p admit to [facility] on 4/10/2022 w/ Sepsis without acute organ dysfunction ([facility] [address] )  Comorbidities affecting pt's functional performance at time of assessment include: arthritis, a-fib, diverticulosis, tachycardia, pulmonary artery HTN, RBBB, urine retention  Personal factors affecting pt at time of IE include:steps to enter environment, limited home support, difficulty performing ADLS, difficulty performing IADLS , limited insight into deficits and health management   Prior to admission, pt was (I) with ADLs and (A) with IADLs with use of SPC during mobility  Upon evaluation: Pt requires (S)-min (A) x1 with use of RW during mobility 2* the following deficits impacting occupational performance: weakness, decreased strength, decreased balance, decreased tolerance, impaired initiation, impaired problem solving and decreased safety awareness  Pt to benefit from continued skilled OT tx while in the hospital to address deficits as defined above and maximize level of functional independence w ADL's and functional mobility  Occupational Performance areas to address include: grooming, bathing/shower, toilet hygiene, dressing, functional mobility, community mobility and clothing management   The patient's raw score on the AM-PAC Daily Activity inpatient short form is 21, standardized score is 44 27, greater than 39 4  Patients at this level are likely to benefit from discharge to home  Please refer to the recommendation of the Occupational Therapist for safe discharge planning  Co treatment with PT secondary to complex medical condition of pt, possible A of 2 required to achieve and maintain transitional movements, requiring the need of skilled therapeutic intervention of 2 therapists to achieve delivery of services       OT Discharge Recommendation: Home with home health rehabilitation

## 2022-04-12 NOTE — PLAN OF CARE
Problem: Potential for Falls  Goal: Patient will remain free of falls  Description: INTERVENTIONS:  - Educate patient/family on patient safety including physical limitations  - Instruct patient to call for assistance with activity   - Consult OT/PT to assist with strengthening/mobility   - Keep Call bell within reach  - Keep bed low and locked with side rails adjusted as appropriate  - Keep care items and personal belongings within reach  - Initiate and maintain comfort rounds  - Make Fall Risk Sign visible to staff  - Offer Toileting every two  Hours, in advance of need  - Initiate/Maintain bed alarm  - Obtain necessary fall risk management equipment: non   - Apply yellow socks and bracelet for high fall risk patients  - Consider moving patient to room near nurses station  Outcome: Progressing     Problem: PAIN - ADULT  Goal: Verbalizes/displays adequate comfort level or baseline comfort level  Description: Interventions:  - Encourage patient to monitor pain and request assistance  - Assess pain using appropriate pain scale  - Administer analgesics based on type and severity of pain and evaluate response  - Implement non-pharmacological measures as appropriate and evaluate response  - Consider cultural and social influences on pain and pain management  - Notify physician/advanced practitioner if interventions unsuccessful or patient reports new pain  Outcome: Progressing     Problem: INFECTION - ADULT  Goal: Absence or prevention of progression during hospitalization  Description: INTERVENTIONS:  - Assess and monitor for signs and symptoms of infection  - Monitor lab/diagnostic results  - Monitor all insertion sites, i e  indwelling lines, tubes, and drains  - [place] appropriate cooling/warming therapies per order  - Administer medications as ordered  - Instruct and encourage patient and family to use good hand hygiene technique  - Identify and instruct in appropriate isolation precautions for identified infection/condition  Outcome: Progressing     Problem: SAFETY ADULT  Goal: Patient will remain free of falls  Description: INTERVENTIONS:  - Educate patient/family on patient safety including physical limitations  - Instruct patient to call for assistance with activity   - Consult OT/PT to assist with strengthening/mobility   - Keep Call bell within reach  - Keep bed low and locked with side rails adjusted as appropriate  - Keep care items and personal belongings within reach  - Initiate and maintain comfort rounds  - Make Fall Risk Sign visible to staff  - Offer Toileting every two  Hours, in advance of need  - Initiate/Maintain bed alarm  - Obtain necessary fall risk management equipment: non   - Apply yellow socks and bracelet for high fall risk patients  - Consider moving patient to room near nurses station  Outcome: Progressing  Goal: Maintain or return to baseline ADL function  Description: INTERVENTIONS:  -  Assess patient's ability to carry out ADLs; assess patient's baseline for ADL function and identify physical deficits which impact ability to perform ADLs (bathing, care of mouth/teeth, toileting, grooming, dressing, etc )  - Assess/evaluate cause of self-care deficits   - Assess range of motion  - Assess patient's mobility; develop plan if impaired  - Assess patient's need for assistive devices and provide as appropriate  - Encourage maximum independence but intervene and supervise when necessary  - Involve family in performance of ADLs  - Assess for home care needs following discharge   - Consider OT consult to assist with ADL evaluation and planning for discharge  - Provide patient education as appropriate  Outcome: Progressing  Goal: Maintains/Returns to pre admission functional level  Description: INTERVENTIONS:  - Perform BMAT or MOVE assessment daily    - Set and communicate daily mobility goal to care team and patient/family/caregiver     - Collaborate with rehabilitation services on mobility goals if consulted  - Perform Range of Motion three times a day  - Stand patient three times a day  - Ambulate patient three times a day  - Out of bed to chair three times a day   - Out of bed for meals three times a day  - Out of bed for toileting  - Record patient progress and toleration of activity level   Outcome: Progressing     Problem: DISCHARGE PLANNING  Goal: Discharge to home or other facility with appropriate resources  Description: INTERVENTIONS:  - Identify barriers to discharge w/patient and caregiver  - Arrange for needed discharge resources and transportation as appropriate  - Identify discharge learning needs (meds, wound care, etc )  - Arrange for interpretive services to assist at discharge as needed  - Refer to Case Management Department for coordinating discharge planning if the patient needs post-hospital services based on physician/advanced practitioner order or complex needs related to functional status, cognitive ability, or social support system  Outcome: Progressing     Problem: Knowledge Deficit  Goal: Patient/family/caregiver demonstrates understanding of disease process, treatment plan, medications, and discharge instructions  Description: Complete learning assessment and assess knowledge base    Interventions:  - Provide teaching at level of understanding  - Provide teaching via preferred learning methods  Outcome: Progressing     Problem: RESPIRATORY - ADULT  Goal: Achieves optimal ventilation and oxygenation  Description: INTERVENTIONS:  - Assess for changes in respiratory status  - Assess for changes in mentation and behavior  - Position to facilitate oxygenation and minimize respiratory effort  - Oxygen administered by appropriate delivery if ordered  - Initiate smoking cessation education as indicated  - Encourage broncho-pulmonary hygiene including cough, deep breathe, Incentive Spirometry  - Assess the need for suctioning and aspirate as needed  - Assess and instruct to report SOB or any respiratory difficulty  - Respiratory Therapy support as indicated  Outcome: Progressing     Problem: MOBILITY - ADULT  Goal: Maintain or return to baseline ADL function  Description: INTERVENTIONS:  -  Assess patient's ability to carry out ADLs; assess patient's baseline for ADL function and identify physical deficits which impact ability to perform ADLs (bathing, care of mouth/teeth, toileting, grooming, dressing, etc )  - Assess/evaluate cause of self-care deficits   - Assess range of motion  - Assess patient's mobility; develop plan if impaired  - Assess patient's need for assistive devices and provide as appropriate  - Encourage maximum independence but intervene and supervise when necessary  - Involve family in performance of ADLs  - Assess for home care needs following discharge   - Consider OT consult to assist with ADL evaluation and planning for discharge  - Provide patient education as appropriate  Outcome: Progressing  Goal: Maintains/Returns to pre admission functional level  Description: INTERVENTIONS:  - Perform BMAT or MOVE assessment daily    - Set and communicate daily mobility goal to care team and patient/family/caregiver  - Collaborate with rehabilitation services on mobility goals if consulted  - Perform Range of Motion three times a day    - Stand patient three times a day  - Ambulate patient three times a day  - Out of bed to chair three times a day   - Out of bed for meals three times a day  - Out of bed for toileting  - Record patient progress and toleration of activity level   Outcome: Progressing     Problem: Prexisting or High Potential for Compromised Skin Integrity  Goal: Skin integrity is maintained or improved  Description: INTERVENTIONS:  - Identify patients at risk for skin breakdown  - Assess and monitor skin integrity  - Assess and monitor nutrition and hydration status  - Monitor labs   - Assess for incontinence   - Turn and reposition patient  - Assist with mobility/ambulation  - Relieve pressure over bony prominences  - Avoid friction and shearing  - Provide appropriate hygiene as needed including keeping skin clean and dry  - Evaluate need for skin moisturizer/barrier cream  - Collaborate with interdisciplinary team   - Patient/family teaching  - Consider wound care consult   Outcome: Progressing

## 2022-04-12 NOTE — PLAN OF CARE
Problem: PHYSICAL THERAPY ADULT  Goal: Performs mobility at highest level of function for planned discharge setting  See evaluation for individualized goals  Description: Treatment/Interventions: Functional transfer training,LE strengthening/ROM,Therapeutic exercise,Endurance training,Bed mobility,Gait training,Elevations          See flowsheet documentation for full assessment, interventions and recommendations  Note: Prognosis: Good  Problem List: Decreased strength,Decreased endurance,Decreased mobility,Impaired balance,Decreased cognition,Decreased safety awareness  Assessment: Patient is a 80 y o  male evaluated by Physical Therapy s/p admit to [address] [place] [address],[address] on 4/10/2022 with admitting diagnosis of: Suicidal ideation, Nausea, Pneumonia, Sepsis, and principal problem of: Sepsis without acute organ dysfunction  PT was consulted to assess patient's functional mobility and discharge needs  Ordered are PT Evaluation and treatment with activity level of: up and OOB as tolerated  Comorbidities affecting patient's physical performance at time of assessment include: arthritis, a-fib, HTN, BPH  Personal factors affecting the patient at time of IE include: lives in 2 story home, ambulating with assistive device, step(s) to enter home, inability to navigate community distances, history of fall(s), impaired safety awareness, hearing impairments and inability/difficulty performing IADLs  Please locate objective findings from PT assessment regarding body systems outlined above  Upon evaluation, pt able to perform all functional mobility with SUP, RW, and increased time  Initially, pt requesting to use his homemade cane  While ambulating with cane, pt moderately unsteady and did experience 1 major LOB which required modA to recover  Pt then instructed on RW use and was able to ambulate 200' with increased stability   Pt agreed that mobility was easier with RW and would be interested in taking one home with him upon d/c  Seated rest break taken following ambulation d/t fatigue; SpO2 and HR remained WFL on 2L O2 throughout  The patient's AM-PAC Basic Mobility Inpatient Short Form Raw Score is 17  A Raw score of greater than 16 suggests the patient may benefit from discharge to home  Please also refer to the recommendation of the Physical Therapist for safe discharge planning  Co treatment with OT secondary to complex medical condition of pt, possible A of 2 required to achieve and maintain transitional movements, requiring the need of skilled therapeutic intervention of 2 therapists to achieve delivery of services  Pt will benefit from continued PT intervention during LOS to address current deficits, increase LOF, and facilitate safe d/c to next level of care when medically appropriate  D/c recommendation at this time is home with home health rehabilitation  PT Discharge Recommendation: Home with home health rehabilitation          See flowsheet documentation for full assessment

## 2022-04-12 NOTE — PROGRESS NOTES
[facility] [address]  Progress Note Shobha  5/14/1928, 80 y o  male MRN: [id number]  Unit/Bed#: 409-01 Encounter: [id number]  Primary Care Provider: Fermin Case,    Date and time admitted to hospital: 4/10/2022  6:02 PM    Pneumonia of left lower lobe due to infectious organism  Assessment & Plan  · Patient has had generalized weakness, nausea, decreased appetite and increased cough over the past 2 days  · Chest x-ray and CT of the abdomen and pelvis showed left lower lobe pneumonia    · Procalcitonin elevated at 0 7>> 0 51>>>> 0 37  · Lactic acid 1 6>>>> 2 2  · White blood cells 22 7>>> 21>>>> 24 81    · COVID/flu/RSV negative  · Urine Legionella and strep pneumonia test negative    · Modify and titrate oxygen therapy based on patient's needs and guideline  · Currently maintained on room air  · Follow-up Sputum, urine, blood cultures  · Continue ceftriaxone day 3    · Respiratory protocol  · Incentive spirometry  · Continue to Encourage oral diet  · Monitor I/OS  · A m  labs  ( trend procal /WBC)  · Seen by PT OT:  Recommended home with health aide ( Case Management office working on it)    * [facility]  Assessment & Plan  · Upon admission, patient met sepsis criteria for leukocytosis with white blood cells 24 81 and elevated lactate of 2 2, and altered mental status  · Active source of infection identified as left lower lobe pneumonia    CT abdomen pelvis:  Left lower lobe consolidation may resemble pneumonia  Procal:  0 7>> 0 51>>>>> 0 37  WBC 22 7>>> 21  Urine Legionella and strep:  Negative  MRSA, COVID, flu:  Negative  Blood and urine cultures no growth to date  · No home oxygen needs  Weaned off oxygen therapy  Maintaining oxygen saturation on room air  · Respiratory protocol  Modify oxygen therapy per guideline  Encourage incentive spirometry  · Continue trend procal, CBC, CMP    · Follow-up blood, urine cultures  · Continue ceftriaxone day 3  Permanent atrial fibrillation [facility])  Assessment & Plan  This is a chronic condition  Controlled on the following medications  -follows up regularly with cardiology at [facility]   -pacemaker in place  · Patient currently rate controlled with metoprolol succinate 24 hour tablets 75 mg oral daily  · Rate is currently well controlled at 60  · Patient takes aspirin 325 mg daily does not use any other anticoagulation     ·  continue aspirin 325 mg daily  ·  continue to monitor rate with routine vitals checks    Suicidal ideation-resolved as of 4/11/2022  Assessment & Plan  · Patient's daughter and granddaughter expressed  to the ED provider that they are concern for suicidal ideation in the patient  They state that he passively makes jokes about committing suicide  They state that these have increased in frequency as of recent  Patient's granddaughter states that she is concerned since the patient does have firearms in the home and was formerly an avid [employer]  I attempted to get into contact with the patient's daughter but was unable to  The patient denies any suicidal ideation states that these are simply jokes  · Follow-up note per the medicine team; 4/11  Patient notes when he is sick he experiences suicidal thoughts  Today he does not have any active suicidal thoughts or depression signs or symptoms  He does not have any history of suicidal attempts  No history of major depressive disorder or major psychiatric illness  He appears in stable condition  Agree with the  Psychiatric team Recs    · No active suicidal thoughts, or depression noted  · No need for psychiatric outpatient follow-up, or behavior health rehab/admission  · Recommended removal of firearms from the house  · Follow-up with primary care physician accordingly  Hyponatremia  Assessment & Plan  · Patient had sodium 133 today    This is below his baseline seen on past labs  · Most likely etiology is dehydration due to decreased p o  Intake  · Patient placed on isolyte 75 mL/hr IV fluids  · Monitor a m  Metabolic panel    Benign prostatic hyperplasia  Assessment & Plan  This is a chronic condition  Stable      · At present patient does not have any urinary complaints  · Patient currently takes Flomax and Proscar at home  · Will continue this inpatient  · Monitor for urinary retention      VTE Pharmacologic Prophylaxis:   Pharmacologic: Enoxaparin (Lovenox)  Mechanical VTE Prophylaxis in Place: Yes    Patient Centered Rounds: I have performed bedside rounds with nursing staff today  Discussions with Specialists or Other Care Team Provider:  Patient  Education and Discussions with Family / Patient:  Patient    Time Spent for Care: 30 minutes  More than 50% of total time spent on counseling and coordination of care as described above  Current Length of Stay: 2 day(s)    Current Patient Status: Inpatient   Certification Statement: The patient will continue to require additional inpatient hospital stay due to Ongoing medical management  Discharge Plan: Within 24 hours if stable  Code Status: Level 1 - Full Code      Subjective:     Patient was seen today at bedside  He does not have any active complaints  He reports that he is at his normal state in terms of alertness and activity  Denies any fatigue or decreased appetite today  No shortness of breath, chest pain or tightness, nausea or vomiting, diarrhea or constipation      Tolerating oral diet  Stooling and voiding accordingly      Patient was weaned off oxygen therapy today  He is maintaining oxygen saturation within normal limits on room air  Objective:   Not in acute distress  Lying comfortable in bed      Vitals:   Temp (24hrs), Av °F (36 7 °C), Min:98 °F (36 7 °C), Max:98 °F (36 7 °C)    Temp:  [98 °F (36 7 °C)] 98 °F (36 7 °C)  HR:  [52-60] 60  Resp:  [19-20] 19  BP: (134-140)/(52-53) 140/52  SpO2:  [87 %-94 %] 93 %  Body mass index is 33 04 kg/m²  Input and Output Summary (last 24 hours): Intake/Output Summary (Last 24 hours) at 4/12/2022 1612  Last data filed at 4/12/2022 1610  Gross per 24 hour   Intake 240 ml   Output 1010 ml   Net -770 ml       Physical Exam:     Physical Exam  Vitals reviewed  Constitutional:       General: He is not in acute distress  Appearance: Normal appearance  HENT:      Head: Normocephalic and atraumatic  Mouth/Throat:      Mouth: Mucous membranes are moist    Eyes:      Conjunctiva/sclera: Conjunctivae normal       Pupils: Pupils are equal, round, and reactive to light  Cardiovascular:      Rate and Rhythm: Normal rate and regular rhythm  Pulses: Normal pulses  Carotid pulses are 2+ on the right side and 2+ on the left side  Radial pulses are 2+ on the right side and 2+ on the left side  Dorsalis pedis pulses are 2+ on the right side and 2+ on the left side  Heart sounds: Normal heart sounds, S1 normal and S2 normal  No murmur heard  Pulmonary:      Effort: No tachypnea, bradypnea or accessory muscle usage  Breath sounds: Normal breath sounds and air entry  No decreased breath sounds, wheezing, rhonchi or rales  Musculoskeletal:      Right lower leg: No edema  Left lower leg: No edema  Neurological:      Mental Status: He is alert and oriented to person, place, and time  Mental status is at baseline  Additional Data:     Labs:    Results from last 7 days   Lab Units 04/12/22  1046 04/11/22  0450 04/10/22  1835   WBC Thousand/uL 22 71* 21 22*   < >   HEMOGLOBIN g/dL 10 6* 10 7*   < >   HEMATOCRIT % 31 5* 32 2*   < >   PLATELETS Thousands/uL 134* 126*   < >   BANDS PCT % 5  --    < >   NEUTROS PCT %  --  89*  --    LYMPHS PCT %  --  3*  --    LYMPHO PCT % 4*  --    < >   MONOS PCT %  --  6  --    MONO PCT % 4  --    < >   EOS PCT % 0 0   < >    < > = values in this interval not displayed       Results from last 7 days   Lab Units 04/11/22  0450   SODIUM mmol/L 133*   POTASSIUM mmol/L 4 0   CHLORIDE mmol/L 100   CO2 mmol/L 24   BUN mg/dL 30*   CREATININE mg/dL 1 14   ANION GAP mmol/L 9   CALCIUM mg/dL 8 1*   ALBUMIN g/dL 2 8*   TOTAL BILIRUBIN mg/dL 1 43*   ALK PHOS U/L 51   ALT U/L 23   AST U/L 23   GLUCOSE RANDOM mg/dL 115     Results from last 7 days   Lab Units 04/10/22  1835   INR  1 42*             Results from last 7 days   Lab Units 04/12/22  1046 04/11/22  0450 04/10/22  2119 04/10/22  1835   LACTIC ACID mmol/L  --   --  1 4 2 2*   PROCALCITONIN ng/ml 0 70* 0 51*  --  0 37*           * I Have Reviewed All Lab Data Listed Above  * Additional Pertinent Lab Tests Reviewed: Timothy 66 Admission Reviewed    Imaging:    Imaging Reports Reviewed Today Include:   CT abdomen pelvis with contrast   Final Result      1  No acute gastrointestinal findings  2   There is partially visualized dense masslike consolidation in the left lower lobe posteriorly suspicious for pneumonia  Recommend follow-up chest CT in 4-6 weeks to ensure resolution  3   There are a few hyperdense nodular foci in the liver, not definitely seen on the prior exam   While these could represent small flash filling hemangiomas, further evaluation is recommended with dedicated MRI with and without IV contrast if feasible    vs triple phase CT of the liver  The study was marked in [facility] for immediate notification  Workstation performed: [id number]         XR chest 1 view portable   ED Interpretation   Possible consolidation left lower lung field      Final Result   Mild cardiomegaly      No acute cardiopulmonary disease  Workstation performed: [id number]             Recent Cultures (last 7 days):     Results from last 7 days   Lab Units 04/11/22  1117 04/10/22  2111 04/10/22  1835   BLOOD CULTURE   --   --  No Growth at 24 hrs  No Growth at 24 hrs  SPUTUM CULTURE  Test not performed  Suggest repeat specimen  --   --    GRAM STAIN RESULT  >10 squamous epithelial cells/lpf, indicating orophayngeal contamination  *  2+ Polys*  2+ Gram positive cocci in pairs, chains and clusters*  2+ Gram positive rods*  1+ Gram negative rods*  Rare Gram negative diplococci*  --   --    LEGIONELLA URINARY ANTIGEN   --  Negative  --        Last 24 Hours Medication List:   Current Facility-Administered Medications   Medication Dose Route Frequency Provider Last Rate    aspirin  325 mg Oral Daily Kulwinder Noss, PA-C      cefTRIAXone  1,000 mg Intravenous Q24H Kulwinder Noss, PA-C 1,000 mg (04/11/22 1940)    cholecalciferol  1,000 Units Oral Daily Kulwinder Noss, PA-C      cyanocobalamin  100 mcg Oral Daily Kulwinder Noss, PA-C      enoxaparin  40 mg Subcutaneous Daily Kulwinder Noss, PA-C      finasteride  5 mg Oral Daily Kulwinder Noss, PA-C      guaiFENesin  600 mg Oral BID Kulwinder Noss, PA-C      metoprolol succinate  75 mg Oral Daily Kulwinder Noss, PA-C      ondansetron  4 mg Intravenous Q6H PRN Kulwinder Noss, PA-C      tamsulosin  0 4 mg Oral Daily Kulwinder Noss, PA-C          Today, Patient Was Seen By: Vick Richard MD    ** Please Note: Dictation voice to text software may have been used in the creation of this document   **

## 2022-04-12 NOTE — CASE MANAGEMENT
Case Management Discharge Planning Note    Patient name Jd Carr  Location [address] [id number]/979-06 MRN [id number]  : 1928 Date 2022       Current Admission Date: 4/10/2022  Current Admission Diagnosis:Sepsis without acute organ dysfunction [facility])   Patient Active Problem List    Diagnosis Date Noted    Pneumonia of left lower lobe due to infectious organism 04/10/2022    [facility] 04/10/2022    Hyponatremia 04/10/2022    Platelets decreased ([facility] ) 2021    Constipation 2021    Permanent atrial fibrillation ([place] [address] ) 2019    Presence of permanent cardiac pacemaker 2019    Medicare annual wellness visit, subsequent 2019    Pulmonary artery hypertension ([facility] ) 2013    RBBB 2013    Diverticulosis 2013    Benign prostatic hyperplasia 2012    Hypercholesterolemia 2012      LOS (days): 2  Geometric Mean LOS (GMLOS) (days): 4 80  Days to GMLOS:3 1     OBJECTIVE:  Risk of Unplanned Readmission Score: 18         Current admission status: Inpatient   Preferred Pharmacy:   [address] [address]  Phone: [phone] Fax: [phone]    Primary Care Provider: Kayode Castillo DO    Primary Insurance: MEDICARE  Secondary Insurance: Lauryn Davenport    DISCHARGE DETAILS:    Discharge planning discussed with[de-identified] patient  Freedom of Choice: Yes  Comments - Freedom of Choice: referral [address]         Is the patient interested in [facility] at discharge?: Yes  [address] requested[de-identified] [address] Name[de-identified] [facility] Provider[de-identified] PCP  Home Health Services Needed[de-identified] Strengthening/Theraputic Exercises to Improve Function,Gait/ADL Training,Evaluate Functional Status and Safety  Homebound Criteria Met[de-identified] Requires the Assistance of Another Person for Safe Ambulation or to Leave the Home,Uses an Assist Device (i e  cane, walker, etc)  Supporting Clincal Findings[de-identified] Fatigues Easliy in Short Distances,Limited Endurance

## 2022-04-12 NOTE — CASE MANAGEMENT
Case Management Discharge Planning Note    Patient name Kyra Alegria  Location [address] [id number] MRN [id number]  : 1928 Date 2022       Current Admission Date: 4/10/2022  Current Admission Diagnosis:Sepsis without acute organ dysfunction [facility])   Patient Active Problem List    Diagnosis Date Noted    Pneumonia of left lower lobe due to infectious organism 04/10/2022    [facility] ) 04/10/2022    Hyponatremia 04/10/2022    Platelets decreased ([facility] ) 2021    Constipation 2021    Permanent atrial fibrillation ([place] [address] ) 2019    Presence of permanent cardiac pacemaker 2019    Medicare annual wellness visit, subsequent 2019    Pulmonary artery hypertension ([facility] ) 2013    RBBB 2013    Diverticulosis 2013    Benign prostatic hyperplasia 2012    Hypercholesterolemia 2012      LOS (days): 2  Geometric Mean LOS (GMLOS) (days): 4 80  Days to GMLOS:3 2     OBJECTIVE:  Risk of Unplanned Readmission Score: 18         Current admission status: Inpatient   Preferred Pharmacy:   [id number]- [place] [address]  Phone: [phone] Fax: [phone]    Primary Care Provider: Didier Morales DO    Primary Insurance: MEDICARE  Secondary Insurance: [place]    DISCHARGE DETAILS:    Discharge planning discussed with[de-identified] patient  Freedom of Choice: Yes  Comments - Freedom of Choice: referral [employer] for a walker       DME Referral Provided  Referral made for DME?: Yes  DME referral completed for the following items[de-identified] William Paget  DME Supplier Name[de-identified] [employer]

## 2022-04-13 LAB
ALBUMIN SERPL BCP-MCNC: 2.5 G/DL (ref 3.5–5)
ALP SERPL-CCNC: 64 U/L (ref 46–116)
ALT SERPL W P-5'-P-CCNC: 75 U/L (ref 12–78)
ANION GAP SERPL CALCULATED.3IONS-SCNC: 8 MMOL/L (ref 4–13)
ANISOCYTOSIS BLD QL SMEAR: PRESENT
AST SERPL W P-5'-P-CCNC: 73 U/L (ref 5–45)
BASOPHILS # BLD MANUAL: 0 THOUSAND/UL (ref 0–0.1)
BASOPHILS NFR MAR MANUAL: 0 % (ref 0–1)
BILIRUB SERPL-MCNC: 0.79 MG/DL (ref 0.2–1)
BUN SERPL-MCNC: 32 MG/DL (ref 5–25)
CALCIUM ALBUM COR SERPL-MCNC: 9.6 MG/DL (ref 8.3–10.1)
CALCIUM SERPL-MCNC: 8.4 MG/DL (ref 8.3–10.1)
CHLORIDE SERPL-SCNC: 100 MMOL/L (ref 100–108)
CO2 SERPL-SCNC: 25 MMOL/L (ref 21–32)
CREAT SERPL-MCNC: 1.17 MG/DL (ref 0.6–1.3)
EOSINOPHIL # BLD MANUAL: 0.2 THOUSAND/UL (ref 0–0.4)
EOSINOPHIL NFR BLD MANUAL: 1 % (ref 0–6)
ERYTHROCYTE [DISTWIDTH] IN BLOOD BY AUTOMATED COUNT: 13.5 % (ref 11.6–15.1)
GFR SERPL CREATININE-BSD FRML MDRD: 53 ML/MIN/1.73SQ M
GLUCOSE SERPL-MCNC: 105 MG/DL (ref 65–140)
HCT VFR BLD AUTO: 32.3 % (ref 36.5–49.3)
HGB BLD-MCNC: 10.8 G/DL (ref 12–17)
LG PLATELETS BLD QL SMEAR: PRESENT
LYMPHOCYTES # BLD AUTO: 0.98 THOUSAND/UL (ref 0.6–4.47)
LYMPHOCYTES # BLD AUTO: 5 % (ref 14–44)
MACROCYTES BLD QL AUTO: PRESENT
MCH RBC QN AUTO: 32 PG (ref 26.8–34.3)
MCHC RBC AUTO-ENTMCNC: 33.4 G/DL (ref 31.4–37.4)
MCV RBC AUTO: 96 FL (ref 82–98)
MONOCYTES # BLD AUTO: 0.78 THOUSAND/UL (ref 0–1.22)
MONOCYTES NFR BLD: 4 % (ref 4–12)
MYELOCYTES NFR BLD MANUAL: 1 % (ref 0–1)
NEUTROPHILS # BLD MANUAL: 16.63 THOUSAND/UL (ref 1.85–7.62)
NEUTS BAND NFR BLD MANUAL: 16 % (ref 0–8)
NEUTS SEG NFR BLD AUTO: 69 % (ref 43–75)
PLATELET # BLD AUTO: 136 THOUSANDS/UL (ref 149–390)
PLATELET BLD QL SMEAR: ABNORMAL
PMV BLD AUTO: 11.4 FL (ref 8.9–12.7)
POTASSIUM SERPL-SCNC: 3.8 MMOL/L (ref 3.5–5.3)
PROCALCITONIN SERPL-MCNC: 0.49 NG/ML
PROT SERPL-MCNC: 6.5 G/DL (ref 6.4–8.2)
RBC # BLD AUTO: 3.37 MILLION/UL (ref 3.88–5.62)
SODIUM SERPL-SCNC: 133 MMOL/L (ref 136–145)
VARIANT LYMPHS # BLD AUTO: 4 %
WBC # BLD AUTO: 19.56 THOUSAND/UL (ref 4.31–10.16)

## 2022-04-13 PROCEDURE — 97530 THERAPEUTIC ACTIVITIES: CPT

## 2022-04-13 PROCEDURE — 84145 PROCALCITONIN (PCT): CPT

## 2022-04-13 PROCEDURE — 99232 SBSQ HOSP IP/OBS MODERATE 35: CPT | Performed by: INTERNAL MEDICINE

## 2022-04-13 PROCEDURE — 97535 SELF CARE MNGMENT TRAINING: CPT

## 2022-04-13 PROCEDURE — 80053 COMPREHEN METABOLIC PANEL: CPT

## 2022-04-13 PROCEDURE — 97110 THERAPEUTIC EXERCISES: CPT

## 2022-04-13 PROCEDURE — 85007 BL SMEAR W/DIFF WBC COUNT: CPT

## 2022-04-13 PROCEDURE — 97116 GAIT TRAINING THERAPY: CPT

## 2022-04-13 PROCEDURE — 85027 COMPLETE CBC AUTOMATED: CPT

## 2022-04-13 RX ADMIN — CEFTRIAXONE 1000 MG: 1 INJECTION, SOLUTION INTRAVENOUS at 20:57

## 2022-04-13 RX ADMIN — FINASTERIDE 5 MG: 5 TABLET, FILM COATED ORAL at 08:33

## 2022-04-13 RX ADMIN — ENOXAPARIN SODIUM 40 MG: 40 INJECTION SUBCUTANEOUS at 08:33

## 2022-04-13 RX ADMIN — METOPROLOL SUCCINATE 75 MG: 50 TABLET, EXTENDED RELEASE ORAL at 08:33

## 2022-04-13 RX ADMIN — ASPIRIN 325 MG ORAL TABLET 325 MG: 325 PILL ORAL at 08:33

## 2022-04-13 RX ADMIN — TAMSULOSIN HYDROCHLORIDE 0.4 MG: 0.4 CAPSULE ORAL at 08:33

## 2022-04-13 RX ADMIN — Medication 1000 UNITS: at 08:33

## 2022-04-13 RX ADMIN — GUAIFENESIN 600 MG: 600 TABLET ORAL at 08:33

## 2022-04-13 RX ADMIN — VITAM B12 100 MCG: 100 TAB at 08:33

## 2022-04-13 RX ADMIN — GUAIFENESIN 600 MG: 600 TABLET ORAL at 18:30

## 2022-04-13 NOTE — PLAN OF CARE
Problem: PHYSICAL THERAPY ADULT  Goal: Performs mobility at highest level of function for planned discharge setting  See evaluation for individualized goals  Description: Treatment/Interventions: Functional transfer training,LE strengthening/ROM,Therapeutic exercise,Endurance training,Bed mobility,Gait training,Elevations          See flowsheet documentation for full assessment, interventions and recommendations  Outcome: Progressing  Note: Prognosis: Good  Problem List: Decreased strength,Decreased endurance,Impaired balance,Decreased mobility,Decreased safety awareness  Assessment: Pt  seen for PT treatment session this date with interventions consisting of  therapeutic exercises, bed mobility, transfers and  gait training w/ emphasis on improving pt's ability to ambulate  Pt  Requiring occasional cues for sequence and safety  In comparison to previous session, Pt  With improvements in activity tolerance  No episodes LOB  Pt is in need of continued activity in PT to improve strength balance endurance mobility transfers and ambulation with return to maximize LOF  From PT/mobility standpoint, recommendation at time of d/c would be home health rehab  in order to promote return to PLOF and independence  The patient's AM-PAC Basic Mobility Inpatient Short Form Raw Score is 18  A Raw score of greater than 16 suggests the patient may benefit from discharge to home  Please also refer to physical therapy recommendation for safe DC planning  PT Discharge Recommendation: Home with home health rehabilitation          See flowsheet documentation for full assessment

## 2022-04-13 NOTE — OCCUPATIONAL THERAPY NOTE
Occupational Therapy Progress Note     Patient Name: Kyra Alegria  EFSSD'F Date: 4/13/2022  Problem List  Principal Problem:    Sepsis without acute organ dysfunction [facility])  Active Problems:    Benign prostatic hyperplasia    Permanent atrial fibrillation (HCC)    Pneumonia of left lower lobe due to infectious organism    Hyponatremia            04/13/22 1404   OT Last Visit   OT Visit Date 04/13/22   Note Type   Note Type Treatment   Restrictions/Precautions   Weight Bearing Precautions Per Order No   Other Precautions Fall Risk;Bed Alarm; Chair Alarm;Multiple lines   Pain Assessment   Pain Assessment Tool 0-10   Pain Score No Pain   ADL   Grooming Assistance 5  Supervision/Setup   Grooming Deficit Supervision/safety;Verbal cueing; Increased time to complete   Grooming Comments Pt compelted grooming tasks standing at sink with use of RW   Toileting Assistance  5  Supervision/Setup   Toileting Deficit Supervison/safety;Verbal cueing; Increased time to complete   Bed Mobility   Sit to Supine 5  Supervision   Additional items Assist x 1;HOB elevated; Bedrails; Increased time required;Verbal cues   Transfers   Sit to Stand 5  Supervision   Additional items Verbal cues; Increased time required;Armrests   Stand to Sit 5  Supervision   Additional items Verbal cues; Increased time required;Armrests   Additional Comments Use of RW   Functional Mobility   Functional Mobility 5  Supervision   Additional Comments Pt completed standing balance/functional mobility around room and hallway with use of RW and S with no significant LOB throughout with O2 WFL throughout treatment  Additional items Rolling walker   Cognition   Overall Cognitive Status Impaired   Arousal/Participation Alert; Cooperative   Attention Within functional limits   Orientation Level Oriented to person;Oriented to place; Disoriented to time;Disoriented to situation   Memory Decreased long term memory   Following Commands Follows one step commands without difficulty Activity Tolerance   Activity Tolerance Patient tolerated treatment well   Assessment   Assessment Patient participated in Skilled OT session this date with interventions consisting of ADL re training with the use of correct body mechnaics and  therapeutic activities to: increase activity tolerance   Patient agreeable to OT treatment session, upon arrival patient was found seated OOB to Recliner  Sit to stand txfrs from jahaira chair with S, standing balance/functional mobility around room and hallway with use of RW and S with no significant LOB throughout, to increase posture, dynamic standing balance, balance during self cares, use of BUE  Pt completed toilet txfr with S, grooming standing at sink with S, toileting S  Sit to supine txfrs with S and use of side rails  Patient requiring verbal cues for safety and verbal cues for correct technique  Patient continues to be functioning below baseline level, occupational performance remains limited secondary to factors listed above and increased risk for falls and injury  From OT standpoint, recommendation at time of d/c would be Home with home health rehabilitation  The patient's raw score on the AM-PAC Daily Activity inpatient short form is 21, standardized score is 44 27, greater than 39 4  Patients at this level are likely to benefit from discharge to home  Please refer to the recommendation of the Occupational Therapist for safe discharge planning     Plan   Goal Expiration Date 04/26/22   OT Treatment Day 1   OT Frequency 3-5x/wk   Recommendation   OT Discharge Recommendation Home with home health rehabilitation   [facility] Daily Activity Inpatient   Lower Body Dressing 3   Bathing 3   Toileting 3   Upper Body Dressing 4   Grooming 4   Eating 4   Daily Activity Raw Score 21   Daily Activity Standardized Score (Calc for Raw Score >=11) 44 27   AM-[facility] Applied Cognition Inpatient   Following a Speech/Presentation 3   Understanding Ordinary Conversation 3   Taking Medications 3   Remembering Where Things Are Placed or Put Away 3   Remembering List of 4-5 Errands 3   Taking Care of Complicated Tasks 3   Applied Cognition Raw Score 18   Applied Cognition Standardized Score 38 07     Pt left supine on bed with all needs in reach and bed alarm on

## 2022-04-13 NOTE — CASE MANAGEMENT
Case Management Discharge Planning Note    Patient name Destinee Feliciano  Location [address] [id number] MRN [id number]  : 1928 Date 2022       Current Admission Date: 4/10/2022  Current Admission Diagnosis:Sepsis without acute organ dysfunction [facility])   Patient Active Problem List    Diagnosis Date Noted    Pneumonia of left lower lobe due to infectious organism 04/10/2022    [facility] 04/10/2022    Hyponatremia 04/10/2022    Platelets decreased ([facility] ) 2021    Constipation 2021    Permanent atrial fibrillation ([place] [address] ) 2019    Presence of permanent cardiac pacemaker 2019    Medicare annual wellness visit, subsequent 2019    Pulmonary artery hypertension ([facility] ) 2013    RBBB 2013    Diverticulosis 2013    Benign prostatic hyperplasia 2012    Hypercholesterolemia 2012      LOS (days): 3  Geometric Mean LOS (GMLOS) (days): 4 80  Days to GMLOS:2 1     OBJECTIVE:  Risk of Unplanned Readmission Score: 16         Current admission status: Inpatient   Preferred Pharmacy:   [address] [id number]  Phone: [phone] Fax: [phone]    Primary Care Provider: Kay Paul DO    Primary Insurance: MEDICARE  Secondary Insurance: Satya Moya    DISCHARGE DETAILS:    Discharge planning discussed with[de-identified] patient  Freedom of Choice: Yes  Comments - Freedom of Choice: [facility] unable to accept pt; [facility] accepted pt       Requested  [address]         Is the patient interested in [address] at discharge?: Yes  [address] requested[de-identified] [address] Name[de-identified] [address] [facility] Provider[de-identified] PCP  Home Health Services Needed[de-identified] Evaluate Functional Status and Safety,Strengthening/Theraputic Exercises to Improve Function,Gait/ADL Training  Homebound Criteria Met[de-identified] Requires the Assistance of Another Person for Safe Ambulation or to Leave the Home,Uses an Assist Device (i e  cane, walker, etc)  Supporting Clincal Findings[de-identified] Limited Endurance,Fatigues Easliy in Short Distances

## 2022-04-13 NOTE — PHYSICAL THERAPY NOTE
PHYSICAL THERAPY NOTE          Patient Name: Destinee ANDERSON Date: 4/13/2022 04/13/22 6823   PT Last Visit   PT Visit Date 04/13/22   Note Type   Note Type Treatment   Pain Assessment   Pain Assessment Tool 0-10   Pain Score 10 - Worst Possible Pain   Restrictions/Precautions   Weight Bearing Precautions Per Order No   Other Precautions Fall Risk; Chair Alarm; Bed Alarm   General   Family/Caregiver Present No   Cognition   Overall Cognitive Status Impaired   Arousal/Participation Alert; Cooperative   Following Commands Follows one step commands without difficulty   Subjective   Subjective Agreeable to therapy  Requests return to bed after ambulation  Sttes II've been up for hours and want to rest  The chair is uncomfortable  Bed Mobility   Sit to Supine 5  Supervision   Additional items Assist x 1;HOB elevated; Increased time required;Verbal cues   Transfers   Sit to Stand 5  Supervision   Additional items Armrests; Increased time required   Stand to Sit 5  Supervision   Additional items Armrests; Increased time required   Ambulation/Elevation   Gait pattern Excessively slow; Short stride; Foward flexed;Decreased foot clearance   Gait Assistance 5  Supervision   Additional items Verbal cues   Assistive Device Rolling walker   Distance 225'   Balance   Static Sitting Good   Dynamic Sitting Good   Static Standing Fair   Dynamic Standing Fair   Ambulatory Fair -  (RW)   Endurance Deficit   Endurance Deficit Yes   Activity Tolerance   Activity Tolerance Patient limited by fatigue   Exercises   Hip Flexion Sitting;20 reps   Hip Abduction Sitting;20 reps   Hip Adduction Sitting;20 reps   Knee AROM Long Arc Quad Sitting;20 reps   Ankle Pumps Sitting;20 reps   Assessment   Prognosis Good   Problem List Decreased strength;Decreased endurance; Impaired balance;Decreased mobility; Decreased safety awareness   Assessment Pt  seen for PT treatment session this date with interventions consisting of  therapeutic exercises, bed mobility, transfers and  gait training w/ emphasis on improving pt's ability to ambulate  Pt  Requiring occasional cues for sequence and safety  In comparison to previous session, Pt  With improvements in activity tolerance  No episodes LOB  Pt is in need of continued activity in PT to improve strength balance endurance mobility transfers and ambulation with return to maximize LOF  From PT/mobility standpoint, recommendation at time of d/c would be home health rehab  in order to promote return to PLOF and independence  The patient's AM-[facility] Basic Mobility Inpatient Short Form Raw Score is 18  A Raw score of greater than 16 suggests the patient may benefit from discharge to home  Please also refer to physical therapy recommendation for safe DC planning  Goals   LTG Expiration Date 04/26/22   Plan   Treatment/Interventions Functional transfer training;LE strengthening/ROM; Elevations; Therapeutic exercise; Endurance training;Bed mobility;Gait training   Progress Progressing toward goals   PT Frequency 3-5x/wk   Recommendation   PT Discharge Recommendation Home with home health rehabilitation   AM-[facility] Basic Mobility Inpatient   Turning in Bed Without Bedrails 3   Lying on Back to Sitting on Edge of Flat Bed 3   Moving Bed to Chair 3   Standing Up From Chair 3   Walk in Room 3   Climb 3-5 Stairs 3   Basic Mobility Inpatient Raw Score 18   Basic Mobility Standardized Score 41 05   Highest Level Of Mobility   JH-HLM Goal 6: Walk 10 steps or more   JH-HLM Highest Level of Mobility 7: Walk 25 feet or more   JH-HLM Goal Achieved Yes   Education   Education Provided Mobility training   Patient Reinforcement needed   End of Consult   Patient Position at End of Consult Supine;Bed/Chair alarm activated; All needs within reach   End of Consult Comments discussed POC with PT

## 2022-04-13 NOTE — ASSESSMENT & PLAN NOTE
· Patient is clinically improving, continue IV antibiotics given significant leukocytosis, continued elevation of procalcitonin level

## 2022-04-13 NOTE — CASE MANAGEMENT
Case Management Discharge Planning Note    Patient name Noel Magaloln  Location [address] [id number] MRN [id number]  : 1928 Date 2022       Current Admission Date: 4/10/2022  Current Admission Diagnosis:Sepsis without acute organ dysfunction [facility])   Patient Active Problem List    Diagnosis Date Noted    Pneumonia of left lower lobe due to infectious organism 04/10/2022    Sepsis without acute organ dysfunction ([facility] [address] ) 04/10/2022    Hyponatremia 04/10/2022    Platelets decreased ([facility] ) 2021    Constipation 2021    Permanent atrial fibrillation ([place] [address] ) 2019    Presence of permanent cardiac pacemaker 2019    Medicare annual wellness visit, subsequent 2019    Pulmonary artery hypertension ([facility] ) 2013    RBBB 2013    Diverticulosis 2013    Benign prostatic hyperplasia 2012    Hypercholesterolemia 2012      LOS (days): 3  Geometric Mean LOS (GMLOS) (days): 4 80  Days to GMLOS:2     OBJECTIVE:  Risk of Unplanned Readmission Score: 16         Current admission status: Inpatient   Preferred Pharmacy:   [address]  [address] [place] [address]  Phone: [phone] Fax: [phone]    Primary Care Provider: Nikita Mcmanus DO    Primary Insurance: MEDICARE  Secondary Insurance: Tressa Shaw with pt's daughter:Krysten Torres who is aware her dad is a possible dc home in the next 24-48 hours  CM also reviewed IMM with Silas Printers  Silas Printers aware of homecare being recommended and that her dad is agreeable to same and that CM arranged homecare for dc   Silas Printers stated we can call her cell phone when pt is ready for dc [phone]                                                                                  IMM Given (Date):: 22  IMM Given to[de-identified] Family (daughter:Krysten Torres)

## 2022-04-13 NOTE — ASSESSMENT & PLAN NOTE
· CT of the abdomen and pelvis showed left lower lobe pneumonia  · Procalcitonin level trending down, continue current antibiotics, possible discharge tomorrow

## 2022-04-13 NOTE — PLAN OF CARE
Problem: OCCUPATIONAL THERAPY ADULT  Goal: Performs self-care activities at highest level of function for planned discharge setting  See evaluation for individualized goals  Description: Treatment Interventions: ADL retraining,Functional transfer training,UE strengthening/ROM,Endurance training,Patient/family training,Equipment evaluation/education,Activityengagement          See flowsheet documentation for full assessment, interventions and recommendations  Note: Limitation: Decreased ADL status,Decreased Safe judgement during ADL,Decreased UE strength,Decreased endurance,Decreased self-care trans,Decreased high-level ADLs     Assessment: Patient participated in Skilled OT session this date with interventions consisting of ADL re training with the use of correct body mechnaics and  therapeutic activities to: increase activity tolerance   Patient agreeable to OT treatment session, upon arrival patient was found seated OOB to Recliner  Sit to stand txfrs from jahaira chair with S, standing balance/functional mobility around room and hallway with use of RW and S with no significant LOB throughout, to increase posture, dynamic standing balance, balance during self cares, use of BUE  Pt completed toilet txfr with S, grooming standing at sink with S, toileting S  Sit to supine txfrs with S and use of side rails  Patient requiring verbal cues for safety and verbal cues for correct technique  Patient continues to be functioning below baseline level, occupational performance remains limited secondary to factors listed above and increased risk for falls and injury  From OT standpoint, recommendation at time of d/c would be Home with home health rehabilitation  The patient's raw score on the AM-PAC Daily Activity inpatient short form is 21, standardized score is 44 27, greater than 39 4  Patients at this level are likely to benefit from discharge to home   Please refer to the recommendation of the Occupational Therapist for safe discharge planning       OT Discharge Recommendation: Home with home health rehabilitation

## 2022-04-13 NOTE — PROGRESS NOTES
[address] [facility] [address]  Progress Note Galen Pall 1928, 80 y o  male MRN: [id number]  Unit/Bed#: 409-01 Encounter: [id number]  Primary Care Provider: Alfredo Sal DO   Date and time admitted to hospital: 4/10/2022  6:02 PM    * Sepsis without acute organ dysfunction [facility])  Assessment & Plan  · Patient is clinically improving, continue IV antibiotics given significant leukocytosis, continued elevation of procalcitonin level  Pneumonia of left lower lobe due to infectious organism  Assessment & Plan  · CT of the abdomen and pelvis showed left lower lobe pneumonia  · Procalcitonin level trending down, continue current antibiotics, possible discharge tomorrow    Hyponatremia  Assessment & Plan  Mild hyponatremia, follow up a m  Labs    Permanent atrial fibrillation [facility])  Assessment & Plan  This is a chronic condition  Controlled on the following medications  -follows up regularly with cardiology at [facility]   -pacemaker in place  · Patient currently rate controlled with metoprolol succinate 24 hour tablets 75 mg oral daily    · Patient takes aspirin 325 mg daily does not use any other anticoagulation     ·  continue aspirin 325 mg daily  ·  continue to monitor rate with routine vitals checks    Benign prostatic hyperplasia  Assessment & Plan  This is a chronic condition  Stable      · At present patient does not have any urinary complaints  · Patient currently takes Flomax and Proscar at home  · Will continue this inpatient  · Monitor for urinary retention        Code Status: Level 1 - Full Code    Subjective:   No pain    Objective:     Vitals:   Temp (24hrs), Av 6 °F (37 °C), Min:97 7 °F (36 5 °C), Max:99 4 °F (37 4 °C)    Temp:  [97 7 °F (36 5 °C)-99 4 °F (37 4 °C)] 97 7 °F (36 5 °C)  HR:  [60] 60  Resp:  [18-19] 18  BP: (137-150)/(51-56) 150/56  SpO2:  [89 %-93 %] 93 %  Body mass index is 33 04 kg/m²  Input and Output Summary (last 24 hours):      Intake/Output Summary (Last 24 hours) at 4/13/2022 1256  Last data filed at 4/13/2022 1239  Gross per 24 hour   Intake 600 ml   Output 675 ml   Net -75 ml       Physical Exam:   Physical Exam  Vitals and nursing note reviewed  HENT:      Head: Normocephalic and atraumatic  Right Ear: External ear normal  There is no impacted cerumen  Left Ear: External ear normal  There is no impacted cerumen  Cardiovascular:      Rate and Rhythm: Normal rate  Pulses: Normal pulses  Pulmonary:      Effort: Pulmonary effort is normal       Breath sounds: Normal breath sounds  Abdominal:      General: Abdomen is flat  There is no distension  Palpations: Abdomen is soft  Tenderness: There is no abdominal tenderness  Genitourinary:     Penis: Normal     Musculoskeletal:         General: No swelling  Normal range of motion  Cervical back: Normal range of motion  Right lower leg: No edema  Left lower leg: No edema  Skin:     General: Skin is warm and dry  Findings: No lesion or rash  Neurological:      General: No focal deficit present  Mental Status: He is alert  Psychiatric:         Mood and Affect: Mood normal          Behavior: Behavior normal          Thought Content: Thought content normal          Judgment: Judgment normal           Additional Data:     Labs:  Results from last 7 days   Lab Units 04/13/22  0457 04/12/22  1046 04/11/22  0450   WBC Thousand/uL 19 56*   < > 21 22*   HEMOGLOBIN g/dL 10 8*   < > 10 7*   HEMATOCRIT % 32 3*   < > 32 2*   PLATELETS Thousands/uL 136*   < > 126*   BANDS PCT % 16*   < >  --    NEUTROS PCT %  --   --  89*   LYMPHS PCT %  --   --  3*   LYMPHO PCT % 5*   < >  --    MONOS PCT %  --   --  6   MONO PCT % 4   < >  --    EOS PCT % 1   < > 0    < > = values in this interval not displayed       Results from last 7 days   Lab Units 04/13/22  0457   SODIUM mmol/L 133*   POTASSIUM mmol/L 3 8   CHLORIDE mmol/L 100   CO2 mmol/L 25   BUN mg/dL 32* CREATININE mg/dL 1 17   ANION GAP mmol/L 8   CALCIUM mg/dL 8 4   ALBUMIN g/dL 2 5*   TOTAL BILIRUBIN mg/dL 0 79   ALK PHOS U/L 64   ALT U/L 75   AST U/L 73*   GLUCOSE RANDOM mg/dL 105     Results from last 7 days   Lab Units 04/10/22  1835   INR  1 42*             Results from last 7 days   Lab Units 04/13/22  0457 04/12/22  1046 04/11/22  0450 04/10/22  2119 04/10/22  1835   LACTIC ACID mmol/L  --   --   --  1 4 2 2*   PROCALCITONIN ng/ml 0 49* 0 70* 0 51*  --  0 37*       Lines/Drains:  Invasive Devices  Report    Peripheral Intravenous Line            Peripheral IV 04/11/22 Distal;Dorsal (posterior); Left Forearm 2 days                      Imaging: No pertinent imaging reviewed  Recent Cultures (last 7 days):   Results from last 7 days   Lab Units 04/11/22  1117 04/10/22  2111 04/10/22  1835   BLOOD CULTURE   --   --  No Growth at 48 hrs  No Growth at 48 hrs  SPUTUM CULTURE  Test not performed  Suggest repeat specimen  --   --    GRAM STAIN RESULT  >10 squamous epithelial cells/lpf, indicating orophayngeal contamination  *  2+ Polys*  2+ Gram positive cocci in pairs, chains and clusters*  2+ Gram positive rods*  1+ Gram negative rods*  Rare Gram negative diplococci*  --   --    LEGIONELLA URINARY ANTIGEN   --  Negative  --        Last 24 Hours Medication List:   Current Facility-Administered Medications   Medication Dose Route Frequency Provider Last Rate    aspirin  325 mg Oral Daily Veronica Rubin PA-C      cefTRIAXone  1,000 mg Intravenous Q24H Veronica Rubin PA-C 1,000 mg (04/12/22 2018)    cholecalciferol  1,000 Units Oral Daily Veronica Rubin PA-C      cyanocobalamin  100 mcg Oral Daily Veronica Rubin PA-C      enoxaparin  40 mg Subcutaneous Daily Veronica Rubin PA-C      finasteride  5 mg Oral Daily Veronica Rubin PA-C      guaiFENesin  600 mg Oral BID Veronica Rubin PA-C      metoprolol succinate  75 mg Oral Daily Veronica Rubin YANNA      ondansetron  4 mg Intravenous Q6H PRN Nieves Mendosa PA-C      tamsulosin  0 4 mg Oral Daily Nieves Mendosa PA-C          Today, Patient Was Seen By: Jahaira Nunez DO    **Please Note: This note may have been constructed using a voice recognition system  **

## 2022-04-13 NOTE — ASSESSMENT & PLAN NOTE
This is a chronic condition  Controlled on the following medications  -follows up regularly with cardiology at [facility]   -pacemaker in place      · Patient currently rate controlled with metoprolol succinate 24 hour tablets 75 mg oral daily    · Patient takes aspirin 325 mg daily does not use any other anticoagulation     ·  continue aspirin 325 mg daily  ·  continue to monitor rate with routine vitals checks

## 2022-04-13 NOTE — PLAN OF CARE
Problem: Potential for Falls  Goal: Patient will remain free of falls  Description: INTERVENTIONS:  - Educate patient/family on patient safety including physical limitations  - Instruct patient to call for assistance with activity   - Consult OT/PT to assist with strengthening/mobility   - Keep Call bell within reach  - Keep bed low and locked with side rails adjusted as appropriate  - Keep care items and personal belongings within reach  - Initiate and maintain comfort rounds  - Make Fall Risk Sign visible to staff  - Offer Toileting every two  Hours, in advance of need  - Initiate/Maintain bed alarm  - Obtain necessary fall risk management equipment: non   - Apply yellow socks and bracelet for high fall risk patients  - Consider moving patient to room near nurses station  Outcome: Progressing     Problem: PAIN - ADULT  Goal: Verbalizes/displays adequate comfort level or baseline comfort level  Description: Interventions:  - Encourage patient to monitor pain and request assistance  - Assess pain using appropriate pain scale  - Administer analgesics based on type and severity of pain and evaluate response  - Implement non-pharmacological measures as appropriate and evaluate response  - Consider cultural and social influences on pain and pain management  - Notify physician/advanced practitioner if interventions unsuccessful or patient reports new pain  Outcome: Progressing     Problem: INFECTION - ADULT  Goal: Absence or prevention of progression during hospitalization  Description: INTERVENTIONS:  - Assess and monitor for signs and symptoms of infection  - Monitor lab/diagnostic results  - Monitor all insertion sites, i e  indwelling lines, tubes, and drains  - [place] appropriate cooling/warming therapies per order  - Administer medications as ordered  - Instruct and encourage patient and family to use good hand hygiene technique  - Identify and instruct in appropriate isolation precautions for identified infection/condition  Outcome: Progressing     Problem: SAFETY ADULT  Goal: Patient will remain free of falls  Description: INTERVENTIONS:  - Educate patient/family on patient safety including physical limitations  - Instruct patient to call for assistance with activity   - Consult OT/PT to assist with strengthening/mobility   - Keep Call bell within reach  - Keep bed low and locked with side rails adjusted as appropriate  - Keep care items and personal belongings within reach  - Initiate and maintain comfort rounds  - Make Fall Risk Sign visible to staff  - Offer Toileting every two  Hours, in advance of need  - Initiate/Maintain bed alarm  - Obtain necessary fall risk management equipment: non   - Apply yellow socks and bracelet for high fall risk patients  - Consider moving patient to room near nurses station  Outcome: Progressing  Goal: Maintain or return to baseline ADL function  Description: INTERVENTIONS:  -  Assess patient's ability to carry out ADLs; assess patient's baseline for ADL function and identify physical deficits which impact ability to perform ADLs (bathing, care of mouth/teeth, toileting, grooming, dressing, etc )  - Assess/evaluate cause of self-care deficits   - Assess range of motion  - Assess patient's mobility; develop plan if impaired  - Assess patient's need for assistive devices and provide as appropriate  - Encourage maximum independence but intervene and supervise when necessary  - Involve family in performance of ADLs  - Assess for home care needs following discharge   - Consider OT consult to assist with ADL evaluation and planning for discharge  - Provide patient education as appropriate  Outcome: Progressing  Goal: Maintains/Returns to pre admission functional level  Description: INTERVENTIONS:  - Perform BMAT or MOVE assessment daily    - Set and communicate daily mobility goal to care team and patient/family/caregiver     - Collaborate with rehabilitation services on mobility goals if consulted  - Perform Range of Motion three times a day  - Stand patient three times a day  - Ambulate patient three times a day  - Out of bed to chair three times a day   - Out of bed for meals three times a day  - Out of bed for toileting  - Record patient progress and toleration of activity level   Outcome: Progressing     Problem: DISCHARGE PLANNING  Goal: Discharge to home or other facility with appropriate resources  Description: INTERVENTIONS:  - Identify barriers to discharge w/patient and caregiver  - Arrange for needed discharge resources and transportation as appropriate  - Identify discharge learning needs (meds, wound care, etc )  - Arrange for interpretive services to assist at discharge as needed  - Refer to Case Management Department for coordinating discharge planning if the patient needs post-hospital services based on physician/advanced practitioner order or complex needs related to functional status, cognitive ability, or social support system  Outcome: Progressing     Problem: Knowledge Deficit  Goal: Patient/family/caregiver demonstrates understanding of disease process, treatment plan, medications, and discharge instructions  Description: Complete learning assessment and assess knowledge base    Interventions:  - Provide teaching at level of understanding  - Provide teaching via preferred learning methods  Outcome: Progressing     Problem: RESPIRATORY - ADULT  Goal: Achieves optimal ventilation and oxygenation  Description: INTERVENTIONS:  - Assess for changes in respiratory status  - Assess for changes in mentation and behavior  - Position to facilitate oxygenation and minimize respiratory effort  - Oxygen administered by appropriate delivery if ordered  - Initiate smoking cessation education as indicated  - Encourage broncho-pulmonary hygiene including cough, deep breathe, Incentive Spirometry  - Assess the need for suctioning and aspirate as needed  - Assess and instruct to report SOB or any respiratory difficulty  - Respiratory Therapy support as indicated  Outcome: Progressing     Problem: MOBILITY - ADULT  Goal: Maintain or return to baseline ADL function  Description: INTERVENTIONS:  -  Assess patient's ability to carry out ADLs; assess patient's baseline for ADL function and identify physical deficits which impact ability to perform ADLs (bathing, care of mouth/teeth, toileting, grooming, dressing, etc )  - Assess/evaluate cause of self-care deficits   - Assess range of motion  - Assess patient's mobility; develop plan if impaired  - Assess patient's need for assistive devices and provide as appropriate  - Encourage maximum independence but intervene and supervise when necessary  - Involve family in performance of ADLs  - Assess for home care needs following discharge   - Consider OT consult to assist with ADL evaluation and planning for discharge  - Provide patient education as appropriate  Outcome: Progressing  Goal: Maintains/Returns to pre admission functional level  Description: INTERVENTIONS:  - Perform BMAT or MOVE assessment daily    - Set and communicate daily mobility goal to care team and patient/family/caregiver  - Collaborate with rehabilitation services on mobility goals if consulted  - Perform Range of Motion three times a day    - Stand patient three times a day  - Ambulate patient three times a day  - Out of bed to chair three times a day   - Out of bed for meals three times a day  - Out of bed for toileting  - Record patient progress and toleration of activity level   Outcome: Progressing     Problem: Prexisting or High Potential for Compromised Skin Integrity  Goal: Skin integrity is maintained or improved  Description: INTERVENTIONS:  - Identify patients at risk for skin breakdown  - Assess and monitor skin integrity  - Assess and monitor nutrition and hydration status  - Monitor labs   - Assess for incontinence   - Turn and reposition patient  - Assist with mobility/ambulation  - Relieve pressure over bony prominences  - Avoid friction and shearing  - Provide appropriate hygiene as needed including keeping skin clean and dry  - Evaluate need for skin moisturizer/barrier cream  - Collaborate with interdisciplinary team   - Patient/family teaching  - Consider wound care consult   Outcome: Progressing

## 2022-04-14 ENCOUNTER — TRANSITIONAL CARE MANAGEMENT (OUTPATIENT)
Dept: FAMILY MEDICINE CLINIC | Facility: CLINIC | Age: 87
End: 2022-04-14

## 2022-04-14 VITALS
TEMPERATURE: 97.1 F | WEIGHT: 192.46 LBS | SYSTOLIC BLOOD PRESSURE: 147 MMHG | BODY MASS INDEX: 32.86 KG/M2 | RESPIRATION RATE: 17 BRPM | DIASTOLIC BLOOD PRESSURE: 61 MMHG | HEART RATE: 60 BPM | OXYGEN SATURATION: 90 % | HEIGHT: 64 IN

## 2022-04-14 PROBLEM — E87.1 HYPONATREMIA: Status: RESOLVED | Noted: 2022-04-10 | Resolved: 2022-04-14

## 2022-04-14 PROBLEM — A41.9 SEPSIS WITHOUT ACUTE ORGAN DYSFUNCTION (HCC): Status: RESOLVED | Noted: 2022-04-10 | Resolved: 2022-04-14

## 2022-04-14 LAB
ALBUMIN SERPL BCP-MCNC: 2.4 G/DL (ref 3.5–5)
ALP SERPL-CCNC: 67 U/L (ref 46–116)
ALT SERPL W P-5'-P-CCNC: 114 U/L (ref 12–78)
ANION GAP SERPL CALCULATED.3IONS-SCNC: 8 MMOL/L (ref 4–13)
AST SERPL W P-5'-P-CCNC: 106 U/L (ref 5–45)
BASOPHILS # BLD AUTO: 0.05 THOUSANDS/ΜL (ref 0–0.1)
BASOPHILS NFR BLD AUTO: 0 % (ref 0–1)
BILIRUB SERPL-MCNC: 0.68 MG/DL (ref 0.2–1)
BUN SERPL-MCNC: 23 MG/DL (ref 5–25)
CALCIUM ALBUM COR SERPL-MCNC: 9.5 MG/DL (ref 8.3–10.1)
CALCIUM SERPL-MCNC: 8.2 MG/DL (ref 8.3–10.1)
CHLORIDE SERPL-SCNC: 103 MMOL/L (ref 100–108)
CO2 SERPL-SCNC: 25 MMOL/L (ref 21–32)
CREAT SERPL-MCNC: 1.01 MG/DL (ref 0.6–1.3)
EOSINOPHIL # BLD AUTO: 0.15 THOUSAND/ΜL (ref 0–0.61)
EOSINOPHIL NFR BLD AUTO: 1 % (ref 0–6)
ERYTHROCYTE [DISTWIDTH] IN BLOOD BY AUTOMATED COUNT: 13.6 % (ref 11.6–15.1)
GFR SERPL CREATININE-BSD FRML MDRD: 63 ML/MIN/1.73SQ M
GLUCOSE SERPL-MCNC: 101 MG/DL (ref 65–140)
HCT VFR BLD AUTO: 31.7 % (ref 36.5–49.3)
HGB BLD-MCNC: 10.6 G/DL (ref 12–17)
IMM GRANULOCYTES # BLD AUTO: >0.5 THOUSAND/UL (ref 0–0.2)
IMM GRANULOCYTES NFR BLD AUTO: 5 % (ref 0–2)
INR PPP: 1.37 (ref 0.84–1.19)
LYMPHOCYTES # BLD AUTO: 0.83 THOUSANDS/ΜL (ref 0.6–4.47)
LYMPHOCYTES NFR BLD AUTO: 5 % (ref 14–44)
MAGNESIUM SERPL-MCNC: 2.3 MG/DL (ref 1.6–2.6)
MCH RBC QN AUTO: 32.1 PG (ref 26.8–34.3)
MCHC RBC AUTO-ENTMCNC: 33.4 G/DL (ref 31.4–37.4)
MCV RBC AUTO: 96 FL (ref 82–98)
MONOCYTES # BLD AUTO: 0.58 THOUSAND/ΜL (ref 0.17–1.22)
MONOCYTES NFR BLD AUTO: 3 % (ref 4–12)
NEUTROPHILS # BLD AUTO: 15.01 THOUSANDS/ΜL (ref 1.85–7.62)
NEUTS SEG NFR BLD AUTO: 86 % (ref 43–75)
NRBC BLD AUTO-RTO: 0 /100 WBCS
PHOSPHATE SERPL-MCNC: 2.1 MG/DL (ref 2.3–4.1)
PLATELET # BLD AUTO: 148 THOUSANDS/UL (ref 149–390)
PMV BLD AUTO: 11.3 FL (ref 8.9–12.7)
POTASSIUM SERPL-SCNC: 3.5 MMOL/L (ref 3.5–5.3)
PROCALCITONIN SERPL-MCNC: 0.31 NG/ML
PROT SERPL-MCNC: 6.1 G/DL (ref 6.4–8.2)
PROTHROMBIN TIME: 16.2 SECONDS (ref 11.6–14.5)
RBC # BLD AUTO: 3.3 MILLION/UL (ref 3.88–5.62)
SODIUM SERPL-SCNC: 136 MMOL/L (ref 136–145)
WBC # BLD AUTO: 17.48 THOUSAND/UL (ref 4.31–10.16)

## 2022-04-14 PROCEDURE — 85027 COMPLETE CBC AUTOMATED: CPT | Performed by: INTERNAL MEDICINE

## 2022-04-14 PROCEDURE — 97116 GAIT TRAINING THERAPY: CPT

## 2022-04-14 PROCEDURE — 84145 PROCALCITONIN (PCT): CPT | Performed by: INTERNAL MEDICINE

## 2022-04-14 PROCEDURE — 99239 HOSP IP/OBS DSCHRG MGMT >30: CPT | Performed by: INTERNAL MEDICINE

## 2022-04-14 PROCEDURE — 84100 ASSAY OF PHOSPHORUS: CPT | Performed by: INTERNAL MEDICINE

## 2022-04-14 PROCEDURE — 85610 PROTHROMBIN TIME: CPT | Performed by: INTERNAL MEDICINE

## 2022-04-14 PROCEDURE — 83735 ASSAY OF MAGNESIUM: CPT | Performed by: INTERNAL MEDICINE

## 2022-04-14 PROCEDURE — 97530 THERAPEUTIC ACTIVITIES: CPT

## 2022-04-14 PROCEDURE — 80053 COMPREHEN METABOLIC PANEL: CPT | Performed by: INTERNAL MEDICINE

## 2022-04-14 RX ORDER — CEFDINIR 300 MG/1
300 CAPSULE ORAL EVERY 12 HOURS SCHEDULED
Qty: 5 CAPSULE | Refills: 0 | Status: SHIPPED | OUTPATIENT
Start: 2022-04-14 | End: 2022-04-17

## 2022-04-14 RX ORDER — CEFDINIR 300 MG/1
300 CAPSULE ORAL EVERY 12 HOURS SCHEDULED
Status: DISCONTINUED | OUTPATIENT
Start: 2022-04-14 | End: 2022-04-14 | Stop reason: HOSPADM

## 2022-04-14 RX ADMIN — CEFDINIR 300 MG: 300 CAPSULE ORAL at 09:42

## 2022-04-14 RX ADMIN — TAMSULOSIN HYDROCHLORIDE 0.4 MG: 0.4 CAPSULE ORAL at 08:55

## 2022-04-14 RX ADMIN — ASPIRIN 325 MG ORAL TABLET 325 MG: 325 PILL ORAL at 08:55

## 2022-04-14 RX ADMIN — ENOXAPARIN SODIUM 40 MG: 40 INJECTION SUBCUTANEOUS at 08:55

## 2022-04-14 RX ADMIN — GUAIFENESIN 600 MG: 600 TABLET ORAL at 08:55

## 2022-04-14 RX ADMIN — VITAM B12 100 MCG: 100 TAB at 08:55

## 2022-04-14 RX ADMIN — Medication 1000 UNITS: at 08:55

## 2022-04-14 RX ADMIN — FINASTERIDE 5 MG: 5 TABLET, FILM COATED ORAL at 08:55

## 2022-04-14 RX ADMIN — METOPROLOL SUCCINATE 75 MG: 50 TABLET, EXTENDED RELEASE ORAL at 08:55

## 2022-04-14 NOTE — PHYSICAL THERAPY NOTE
PHYSICAL THERAPY NOTE          Patient Name: Noel Magallon  UBQDT'G Date: 4/14/2022 04/14/22 0850   PT Last Visit   PT Visit Date 04/14/22   Note Type   Note Type Treatment   Pain Assessment   Pain Assessment Tool 0-10   Pain Score No Pain   Restrictions/Precautions   Weight Bearing Precautions Per Order No   Other Precautions Fall Risk;Bed Alarm; Chair Alarm;Multiple lines   General   Family/Caregiver Present No   Cognition   Overall Cognitive Status Impaired   Arousal/Participation Alert; Cooperative   Following Commands Follows one step commands without difficulty   Subjective   Subjective Offered no c/o  Bed Mobility   Additional Comments OOB at start and end of PT session   Transfers   Sit to Stand 5  Supervision   Additional items Increased time required;Verbal cues;Armrests   Stand to Sit 5  Supervision   Additional items Armrests; Increased time required;Verbal cues   Stand pivot 5  Supervision   Additional items Verbal cues   Toilet transfer 5  Supervision   Additional items Assist x 1; Increased time required;Verbal cues;Standard toilet   Additional Comments Able to manage clothing and hygiene   Ambulation/Elevation   Gait pattern Excessively slow; Short stride; Foward flexed;Decreased foot clearance   Gait Assistance 5  Supervision   Additional items Verbal cues   Assistive Device Rolling walker   Distance 200'   Balance   Static Sitting Good   Dynamic Sitting Fair   Static Standing Fair   Dynamic Standing Fair   Ambulatory Fair -  (RW)   Endurance Deficit   Endurance Deficit Yes   Activity Tolerance   Activity Tolerance Patient limited by fatigue   Assessment   Prognosis Good   Problem List Decreased strength;Decreased endurance; Impaired balance;Decreased mobility; Decreased safety awareness   Assessment Pt  seen for PT treatment session this date with interventions consisting of  therapeutic exercises,  transfers and  gait training w/ emphasis on improving pt's ability to ambulate  Pt  Requiring occasional cues for sequence and safety  In comparison to previous session, Pt  With improvements in activity tolerance  No episodes LOB  Pt is in need of continued activity in PT to improve strength balance endurance mobility transfers and ambulation with return to maximize LOF  From PT/mobility standpoint, recommendation at time of d/c would be home health rehab  in order to promote return to PLOF and independence  The patient's AM-PAC Basic Mobility Inpatient Short Form Raw Score is 20  A Raw score of greater than 16 suggests the patient may benefit from discharge to home  Please also refer to physical therapy recommendation for safe DC planning  Goals   LTG Expiration Date 04/26/22   PT Treatment Day 2   Plan   Treatment/Interventions Functional transfer training;LE strengthening/ROM; Therapeutic exercise;Elevations; Bed mobility;Gait training; Endurance training   Progress Progressing toward goals   PT Frequency 3-5x/wk   Recommendation   PT Discharge Recommendation Home with home health rehabilitation   AM-PAC Basic Mobility Inpatient   Turning in Bed Without Bedrails 4   Lying on Back to Sitting on Edge of Flat Bed 3   Moving Bed to Chair 3   Standing Up From Chair 4   Walk in Room 3   Climb 3-5 Stairs 3   Basic Mobility Inpatient Raw Score 20   Basic Mobility Standardized Score 43 99   Highest Level Of Mobility   JH-HLM Goal 6: Walk 10 steps or more   JH-HLM Highest Level of Mobility 7: Walk 25 feet or more   JH-HLM Goal Achieved Yes   Education   Education Provided Mobility training   Patient Reinforcement needed   End of Consult   Patient Position at End of Consult Seated edge of bed;Bed/Chair alarm activated; All needs within reach   End of Consult Comments discussed POC with PT

## 2022-04-14 NOTE — ASSESSMENT & PLAN NOTE
This is a chronic condition  Stable      · patient does not have any urinary complaints  · currently takes Flomax and Proscar at home  · Follow-up with PCP accordingly

## 2022-04-14 NOTE — DISCHARGE INSTRUCTIONS
Community Acquired Pneumonia   WHAT YOU NEED TO KNOW:   Community-acquired pneumonia (CAP) is a lung infection that you get outside of a hospital or nursing home setting  Your lungs become inflamed and cannot work well  CAP may be caused by bacteria, viruses, or fungi  DISCHARGE INSTRUCTIONS:   Seek care immediately if:   · You are confused and cannot think clearly  · You have increased trouble breathing  · Your lips or fingernails turn gray or blue  Call your doctor if:   · Your symptoms do not get better, or they get worse  · You are urinating less, or not at all  · You have questions or concerns about your condition or care  Medicines:   · Medicines  may be given to treat a bacterial, viral, or fungal infection  You may also be given medicines to dilate your bronchial tubes to help you breathe more easily  · Take your medicine as directed  Contact your healthcare provider if you think your medicine is not helping or if you have side effects  Tell him or her if you are allergic to any medicine  Keep a list of the medicines, vitamins, and herbs you take  Include the amounts, and when and why you take them  Bring the list or the pill bottles to follow-up visits  Carry your medicine list with you in case of an emergency  Follow up with your doctor within 3 days or as directed: You may need another x-ray  Write down your questions so you remember to ask them during your visits  Deep breathing and coughing:  Deep breathing helps open the air passages in your lungs  Coughing helps bring up mucus from your lungs  Take a deep breath and hold the breath as long as you can  Then push the air out of your lungs with a deep, strong cough  Spit out any mucus you have coughed up  Take 10 deep breaths in a row every hour that you are awake  Remember to follow each deep breath with a cough    Do not smoke or allow others to smoke around you:  Nicotine and other chemicals in cigarettes and cigars can cause lung damage  Ask your healthcare provider for information if you currently smoke and need help to quit  E-cigarettes or smokeless tobacco still contain nicotine  Talk to your healthcare provider before you use these products  Manage CAP at home:   · Breathe warm, moist air  This helps loosen mucus  Loosely place a warm, wet washcloth over your nose and mouth  A room humidifier may also help make the air moist     · Drink liquids as directed  Ask your healthcare provider how much liquid to drink each day and which liquids to drink  Liquids help make mucus thin and easier to get out of your body  · Gently tap your chest   This helps loosen mucus so it is easier to cough  Lie with your head lower than your chest several times a day and tap your chest     · Get plenty of rest   Rest helps your body heal     Prevent CAP:       · Wash your hands often  Wash your hands several times each day  Wash after you use the bathroom, change a child's diaper, and before you prepare or eat food  Use soap and water every time  Rub your soapy hands together, lacing your fingers  Wash the front and back of your hands, and in between your fingers  Use the fingers of one hand to scrub under the fingernails of the other hand  Wash for at least 20 seconds  Rinse with warm, running water for several seconds  Then dry your hands with a clean towel or paper towel  Use hand  that contains alcohol if soap and water are not available  Do not touch your eyes, nose, or mouth without washing your hands first          · Cover a sneeze or cough  Use a tissue that covers your mouth and nose  Throw the tissue away in a trash can right away  Use the bend of your arm if a tissue is not available  Wash your hands well with soap and water or use a hand   Do not stand close to anyone who is sneezing or coughing  · Clean surfaces often  Clean doorknobs, countertops, cell phones, and other surfaces that are touched often  Use a disinfecting wipe, a single-use sponge, or a cloth you can wash and reuse  Use disinfecting  if you do not have wipes  You can create a disinfecting  by mixing 1 part bleach with 10 parts water  · Try to avoid people who have a cold or the flu  If you are sick, stay away from others as much as possible  · Ask about vaccines you may need  You may need vaccines to help prevent pneumonia and COVID-19  Get an influenza (flu) vaccine every year as soon as recommended, usually in September or October  Your healthcare provider can tell you if you should get any other vaccines, and when to get them  © Copyright [employer] 2022 Information is for End User's use only and may not be sold, redistributed or otherwise used for commercial purposes  All illustrations and images included in CareNotes® are the copyrighted property of A D A M [employer] Inc  or Jose Carson   The above information is an  only  It is not intended as medical advice for individual conditions or treatments  Talk to your doctor, nurse or pharmacist before following any medical regimen to see if it is safe and effective for you

## 2022-04-14 NOTE — ASSESSMENT & PLAN NOTE
This is a chronic condition  Controlled on the following medications  -follows up regularly with cardiology at [facility]   -pacemaker in place  · Patient currently rate controlled with metoprolol succinate 24 hour tablets 75 mg oral daily    · Patient takes aspirin 325 mg daily does not use any other anticoagulation     -stable; follow-up with cardiology offices accordingly

## 2022-04-14 NOTE — PLAN OF CARE
Problem: PHYSICAL THERAPY ADULT  Goal: Performs mobility at highest level of function for planned discharge setting  See evaluation for individualized goals  Description: Treatment/Interventions: Functional transfer training,LE strengthening/ROM,Therapeutic exercise,Endurance training,Bed mobility,Gait training,Elevations          See flowsheet documentation for full assessment, interventions and recommendations  Outcome: Progressing  Note: Prognosis: Good  Problem List: Decreased strength,Decreased endurance,Impaired balance,Decreased mobility,Decreased safety awareness  Assessment: Pt  seen for PT treatment session this date with interventions consisting of  therapeutic exercises,  transfers and  gait training w/ emphasis on improving pt's ability to ambulate  Pt  Requiring occasional cues for sequence and safety  In comparison to previous session, Pt  With improvements in activity tolerance  No episodes LOB  Pt is in need of continued activity in PT to improve strength balance endurance mobility transfers and ambulation with return to maximize LOF  From PT/mobility standpoint, recommendation at time of d/c would be home health rehab  in order to promote return to PLOF and independence  The patient's AM-PAC Basic Mobility Inpatient Short Form Raw Score is 20  A Raw score of greater than 16 suggests the patient may benefit from discharge to home  Please also refer to physical therapy recommendation for safe DC planning  PT Discharge Recommendation: Home with home health rehabilitation          See flowsheet documentation for full assessment

## 2022-04-14 NOTE — CASE MANAGEMENT
Case Management Discharge Planning Note    Patient name Gretta Plummer  Location [id number] MRN [id number]  : 1928 Date 2022       Current Admission Date: 4/10/2022  Current Admission Diagnosis:Permanent atrial fibrillation [facility])   Patient Active Problem List    Diagnosis Date Noted    Pneumonia of left lower lobe due to infectious organism 04/10/2022    Platelets decreased ([facility] [address] ) 2021    Constipation 2021    Permanent atrial fibrillation ([facility] [address] ) 2019    Presence of permanent cardiac pacemaker 2019    Medicare annual wellness visit, subsequent 2019    Pulmonary artery hypertension ([facility] [address] ) 2013    RBBB 2013    Diverticulosis 2013    Benign prostatic hyperplasia 2012    Hypercholesterolemia 2012      LOS (days): 4  Geometric Mean LOS (GMLOS) (days): 4 80  Days to GMLOS:1 2     OBJECTIVE:  Risk of Unplanned Readmission Score: 16         Current admission status: Inpatient   Preferred Pharmacy:   [id number] [place] [address]  Phone: [phone] Fax: [phone]    Primary Care Provider: Carlos Anton DO    Primary Insurance: MEDICARE  Secondary Insurance: Jeanna Ford DETAILS:Pt is being dc'd home on this date with OP follow up and Betzy SARKARA aware of dc and will do a SOC tomorrow  AVS sent over

## 2022-04-14 NOTE — PLAN OF CARE
Problem: Potential for Falls  Goal: Patient will remain free of falls  Description: INTERVENTIONS:  - Educate patient/family on patient safety including physical limitations  - Instruct patient to call for assistance with activity   - Consult OT/PT to assist with strengthening/mobility   - Keep Call bell within reach  - Keep bed low and locked with side rails adjusted as appropriate  - Keep care items and personal belongings within reach  - Initiate and maintain comfort rounds  - Make Fall Risk Sign visible to staff  - Offer Toileting every two  Hours, in advance of need  - Initiate/Maintain bed alarm  - Obtain necessary fall risk management equipment: non   - Apply yellow socks and bracelet for high fall risk patients  - Consider moving patient to room near nurses station  Outcome: Progressing     Problem: PAIN - ADULT  Goal: Verbalizes/displays adequate comfort level or baseline comfort level  Description: Interventions:  - Encourage patient to monitor pain and request assistance  - Assess pain using appropriate pain scale  - Administer analgesics based on type and severity of pain and evaluate response  - Implement non-pharmacological measures as appropriate and evaluate response  - Consider cultural and social influences on pain and pain management  - Notify physician/advanced practitioner if interventions unsuccessful or patient reports new pain  Outcome: Progressing     Problem: INFECTION - ADULT  Goal: Absence or prevention of progression during hospitalization  Description: INTERVENTIONS:  - Assess and monitor for signs and symptoms of infection  - Monitor lab/diagnostic results  - Monitor all insertion sites, i e  indwelling lines, tubes, and drains  - [place] appropriate cooling/warming therapies per order  - Administer medications as ordered  - Instruct and encourage patient and family to use good hand hygiene technique  - Identify and instruct in appropriate isolation precautions for identified infection/condition  Outcome: Progressing     Problem: SAFETY ADULT  Goal: Patient will remain free of falls  Description: INTERVENTIONS:  - Educate patient/family on patient safety including physical limitations  - Instruct patient to call for assistance with activity   - Consult OT/PT to assist with strengthening/mobility   - Keep Call bell within reach  - Keep bed low and locked with side rails adjusted as appropriate  - Keep care items and personal belongings within reach  - Initiate and maintain comfort rounds  - Make Fall Risk Sign visible to staff  - Offer Toileting every two  Hours, in advance of need  - Initiate/Maintain bed alarm  - Obtain necessary fall risk management equipment: non   - Apply yellow socks and bracelet for high fall risk patients  - Consider moving patient to room near nurses station  Outcome: Progressing  Goal: Maintain or return to baseline ADL function  Description: INTERVENTIONS:  -  Assess patient's ability to carry out ADLs; assess patient's baseline for ADL function and identify physical deficits which impact ability to perform ADLs (bathing, care of mouth/teeth, toileting, grooming, dressing, etc )  - Assess/evaluate cause of self-care deficits   - Assess range of motion  - Assess patient's mobility; develop plan if impaired  - Assess patient's need for assistive devices and provide as appropriate  - Encourage maximum independence but intervene and supervise when necessary  - Involve family in performance of ADLs  - Assess for home care needs following discharge   - Consider OT consult to assist with ADL evaluation and planning for discharge  - Provide patient education as appropriate  Outcome: Progressing  Goal: Maintains/Returns to pre admission functional level  Description: INTERVENTIONS:  - Perform BMAT or MOVE assessment daily    - Set and communicate daily mobility goal to care team and patient/family/caregiver     - Collaborate with rehabilitation services on mobility goals if consulted  - Perform Range of Motion three times a day  - Stand patient three times a day  - Ambulate patient three times a day  - Out of bed to chair three times a day   - Out of bed for meals three times a day  - Out of bed for toileting  - Record patient progress and toleration of activity level   Outcome: Progressing     Problem: DISCHARGE PLANNING  Goal: Discharge to home or other facility with appropriate resources  Description: INTERVENTIONS:  - Identify barriers to discharge w/patient and caregiver  - Arrange for needed discharge resources and transportation as appropriate  - Identify discharge learning needs (meds, wound care, etc )  - Arrange for interpretive services to assist at discharge as needed  - Refer to Case Management Department for coordinating discharge planning if the patient needs post-hospital services based on physician/advanced practitioner order or complex needs related to functional status, cognitive ability, or social support system  Outcome: Progressing     Problem: Knowledge Deficit  Goal: Patient/family/caregiver demonstrates understanding of disease process, treatment plan, medications, and discharge instructions  Description: Complete learning assessment and assess knowledge base    Interventions:  - Provide teaching at level of understanding  - Provide teaching via preferred learning methods  Outcome: Progressing     Problem: RESPIRATORY - ADULT  Goal: Achieves optimal ventilation and oxygenation  Description: INTERVENTIONS:  - Assess for changes in respiratory status  - Assess for changes in mentation and behavior  - Position to facilitate oxygenation and minimize respiratory effort  - Oxygen administered by appropriate delivery if ordered  - Initiate smoking cessation education as indicated  - Encourage broncho-pulmonary hygiene including cough, deep breathe, Incentive Spirometry  - Assess the need for suctioning and aspirate as needed  - Assess and instruct to report SOB or any respiratory difficulty  - Respiratory Therapy support as indicated  Outcome: Progressing     Problem: MOBILITY - ADULT  Goal: Maintain or return to baseline ADL function  Description: INTERVENTIONS:  -  Assess patient's ability to carry out ADLs; assess patient's baseline for ADL function and identify physical deficits which impact ability to perform ADLs (bathing, care of mouth/teeth, toileting, grooming, dressing, etc )  - Assess/evaluate cause of self-care deficits   - Assess range of motion  - Assess patient's mobility; develop plan if impaired  - Assess patient's need for assistive devices and provide as appropriate  - Encourage maximum independence but intervene and supervise when necessary  - Involve family in performance of ADLs  - Assess for home care needs following discharge   - Consider OT consult to assist with ADL evaluation and planning for discharge  - Provide patient education as appropriate  Outcome: Progressing  Goal: Maintains/Returns to pre admission functional level  Description: INTERVENTIONS:  - Perform BMAT or MOVE assessment daily    - Set and communicate daily mobility goal to care team and patient/family/caregiver  - Collaborate with rehabilitation services on mobility goals if consulted  - Perform Range of Motion three times a day    - Stand patient three times a day  - Ambulate patient three times a day  - Out of bed to chair three times a day   - Out of bed for meals three times a day  - Out of bed for toileting  - Record patient progress and toleration of activity level   Outcome: Progressing     Problem: Prexisting or High Potential for Compromised Skin Integrity  Goal: Skin integrity is maintained or improved  Description: INTERVENTIONS:  - Identify patients at risk for skin breakdown  - Assess and monitor skin integrity  - Assess and monitor nutrition and hydration status  - Monitor labs   - Assess for incontinence   - Turn and reposition patient  - Assist with mobility/ambulation  - Relieve pressure over bony prominences  - Avoid friction and shearing  - Provide appropriate hygiene as needed including keeping skin clean and dry  - Evaluate need for skin moisturizer/barrier cream  - Collaborate with interdisciplinary team   - Patient/family teaching  - Consider wound care consult   Outcome: Progressing

## 2022-04-14 NOTE — PLAN OF CARE
Problem: Potential for Falls  Goal: Patient will remain free of falls  Description: INTERVENTIONS:  - Educate patient/family on patient safety including physical limitations  - Instruct patient to call for assistance with activity   - Consult OT/PT to assist with strengthening/mobility   - Keep Call bell within reach  - Keep bed low and locked with side rails adjusted as appropriate  - Keep care items and personal belongings within reach  - Initiate and maintain comfort rounds  - Make Fall Risk Sign visible to staff  - Offer Toileting every two  Hours, in advance of need  - Initiate/Maintain bed alarm  - Obtain necessary fall risk management equipment: non   - Apply yellow socks and bracelet for high fall risk patients  - Consider moving patient to room near nurses station  Outcome: Adequate for Discharge     Problem: PAIN - ADULT  Goal: Verbalizes/displays adequate comfort level or baseline comfort level  Description: Interventions:  - Encourage patient to monitor pain and request assistance  - Assess pain using appropriate pain scale  - Administer analgesics based on type and severity of pain and evaluate response  - Implement non-pharmacological measures as appropriate and evaluate response  - Consider cultural and social influences on pain and pain management  - Notify physician/advanced practitioner if interventions unsuccessful or patient reports new pain  Outcome: Adequate for Discharge     Problem: INFECTION - ADULT  Goal: Absence or prevention of progression during hospitalization  Description: INTERVENTIONS:  - Assess and monitor for signs and symptoms of infection  - Monitor lab/diagnostic results  - Monitor all insertion sites, i e  indwelling lines, tubes, and drains  - [place] appropriate cooling/warming therapies per order  - Administer medications as ordered  - Instruct and encourage patient and family to use good hand hygiene technique  - Identify and instruct in appropriate isolation precautions for identified infection/condition  Outcome: Adequate for Discharge     Problem: SAFETY ADULT  Goal: Patient will remain free of falls  Description: INTERVENTIONS:  - Educate patient/family on patient safety including physical limitations  - Instruct patient to call for assistance with activity   - Consult OT/PT to assist with strengthening/mobility   - Keep Call bell within reach  - Keep bed low and locked with side rails adjusted as appropriate  - Keep care items and personal belongings within reach  - Initiate and maintain comfort rounds  - Make Fall Risk Sign visible to staff  - Offer Toileting every two  Hours, in advance of need  - Initiate/Maintain bed alarm  - Obtain necessary fall risk management equipment: non   - Apply yellow socks and bracelet for high fall risk patients  - Consider moving patient to room near nurses station  Outcome: Adequate for Discharge  Goal: Maintain or return to baseline ADL function  Description: INTERVENTIONS:  -  Assess patient's ability to carry out ADLs; assess patient's baseline for ADL function and identify physical deficits which impact ability to perform ADLs (bathing, care of mouth/teeth, toileting, grooming, dressing, etc )  - Assess/evaluate cause of self-care deficits   - Assess range of motion  - Assess patient's mobility; develop plan if impaired  - Assess patient's need for assistive devices and provide as appropriate  - Encourage maximum independence but intervene and supervise when necessary  - Involve family in performance of ADLs  - Assess for home care needs following discharge   - Consider OT consult to assist with ADL evaluation and planning for discharge  - Provide patient education as appropriate  Outcome: Adequate for Discharge  Goal: Maintains/Returns to pre admission functional level  Description: INTERVENTIONS:  - Perform BMAT or MOVE assessment daily    - Set and communicate daily mobility goal to care team and patient/family/caregiver  - Collaborate with rehabilitation services on mobility goals if consulted  - Perform Range of Motion three times a day  - Stand patient three times a day  - Ambulate patient three times a day  - Out of bed to chair three times a day   - Out of bed for meals three times a day  - Out of bed for toileting  - Record patient progress and toleration of activity level   Outcome: Adequate for Discharge     Problem: DISCHARGE PLANNING  Goal: Discharge to home or other facility with appropriate resources  Description: INTERVENTIONS:  - Identify barriers to discharge w/patient and caregiver  - Arrange for needed discharge resources and transportation as appropriate  - Identify discharge learning needs (meds, wound care, etc )  - Arrange for interpretive services to assist at discharge as needed  - Refer to Case Management Department for coordinating discharge planning if the patient needs post-hospital services based on physician/advanced practitioner order or complex needs related to functional status, cognitive ability, or social support system  Outcome: Adequate for Discharge     Problem: Knowledge Deficit  Goal: Patient/family/caregiver demonstrates understanding of disease process, treatment plan, medications, and discharge instructions  Description: Complete learning assessment and assess knowledge base    Interventions:  - Provide teaching at level of understanding  - Provide teaching via preferred learning methods  Outcome: Adequate for Discharge     Problem: RESPIRATORY - ADULT  Goal: Achieves optimal ventilation and oxygenation  Description: INTERVENTIONS:  - Assess for changes in respiratory status  - Assess for changes in mentation and behavior  - Position to facilitate oxygenation and minimize respiratory effort  - Oxygen administered by appropriate delivery if ordered  - Initiate smoking cessation education as indicated  - Encourage broncho-pulmonary hygiene including cough, deep breathe, Incentive Spirometry  - Assess the need for suctioning and aspirate as needed  - Assess and instruct to report SOB or any respiratory difficulty  - Respiratory Therapy support as indicated  Outcome: Adequate for Discharge     Problem: MOBILITY - ADULT  Goal: Maintain or return to baseline ADL function  Description: INTERVENTIONS:  -  Assess patient's ability to carry out ADLs; assess patient's baseline for ADL function and identify physical deficits which impact ability to perform ADLs (bathing, care of mouth/teeth, toileting, grooming, dressing, etc )  - Assess/evaluate cause of self-care deficits   - Assess range of motion  - Assess patient's mobility; develop plan if impaired  - Assess patient's need for assistive devices and provide as appropriate  - Encourage maximum independence but intervene and supervise when necessary  - Involve family in performance of ADLs  - Assess for home care needs following discharge   - Consider OT consult to assist with ADL evaluation and planning for discharge  - Provide patient education as appropriate  Outcome: Adequate for Discharge  Goal: Maintains/Returns to pre admission functional level  Description: INTERVENTIONS:  - Perform BMAT or MOVE assessment daily    - Set and communicate daily mobility goal to care team and patient/family/caregiver  - Collaborate with rehabilitation services on mobility goals if consulted  - Perform Range of Motion three times a day    - Stand patient three times a day  - Ambulate patient three times a day  - Out of bed to chair three times a day   - Out of bed for meals three times a day  - Out of bed for toileting  - Record patient progress and toleration of activity level   Outcome: Adequate for Discharge     Problem: Prexisting or High Potential for Compromised Skin Integrity  Goal: Skin integrity is maintained or improved  Description: INTERVENTIONS:  - Identify patients at risk for skin breakdown  - Assess and monitor skin integrity  - Assess and monitor nutrition and hydration status  - Monitor labs   - Assess for incontinence   - Turn and reposition patient  - Assist with mobility/ambulation  - Relieve pressure over bony prominences  - Avoid friction and shearing  - Provide appropriate hygiene as needed including keeping skin clean and dry  - Evaluate need for skin moisturizer/barrier cream  - Collaborate with interdisciplinary team   - Patient/family teaching  - Consider wound care consult   Outcome: Adequate for Discharge

## 2022-04-14 NOTE — ASSESSMENT & PLAN NOTE
Background: initially p/w sepsis and suspected lobar pneumonia   CT ABD: dense masslike consolidation in the left lower lobe posteriorly     Procal: 0 3>>>0 7  Wbc: 17>>>24    Patient is asymptomatic, vitally stable and continues to be afebrile  No oxygen needs; SPO2 93% on RA  Received 4 days of IV Ceftriaxone    Switched to oral Cefdinir today; Day 1/3   Complete 3 days of cefdinir  Discharge today to home with home health aide   F/u with PCP CT chest in 4-6 wks

## 2022-04-14 NOTE — INCIDENTAL FINDINGS
The following findings require follow up:  Radiographic finding   Finding: There is partially visualized dense masslike consolidation in the left lower lobe posteriorly suspicious for  pneumonia  Recommend follow-up chest CT in 4-6 weeks to ensure resolution     Follow up required: CT chest   Follow up should be done within 4-6 week(s)    Please notify the following clinician to assist with the follow up:   Dr Isaiah Nicholson

## 2022-04-14 NOTE — DISCHARGE SUMMARY
[facility] [address]  Discharge- Arben Mayra 5/14/1928, 80 y o  male MRN: [id number]  Unit/Bed#: 409-01 Encounter: [id number]  Primary Care Provider: Brynn Sheth DO   Date and time admitted to hospital: 4/10/2022  6:02 PM    Pneumonia of left lower lobe due to infectious organism  Assessment & Plan  Background: initially p/w sepsis and suspected lobar pneumonia   CT ABD: dense masslike consolidation in the left lower lobe posteriorly     Procal: 0 3>>>0 7  Wbc: 17>>>24    Patient is asymptomatic, vitally stable and continues to be afebrile  No oxygen needs; SPO2 93% on RA  Received 4 days of IV Ceftriaxone    Switched to oral Cefdinir today; Day 1/3   Complete 3 days of cefdinir  Discharge today to home with home health aide   F/u with PCP CT chest in 4-6 wks  * Sepsis without acute organ dysfunction (HCC)-resolved as of 4/14/2022  Assessment & Plan  · Patient is clinically improving, continue IV antibiotics given significant leukocytosis, continued elevation of procalcitonin level     -continues to be stable and afebrile  No oxygen needs  -will switch to oral antibiotics upon discharge  Sepsis Resolved  Permanent atrial fibrillation [facility])  Assessment & Plan  This is a chronic condition  Controlled on the following medications  -follows up regularly with cardiology at [facility]   -pacemaker in place  · Patient currently rate controlled with metoprolol succinate 24 hour tablets 75 mg oral daily    · Patient takes aspirin 325 mg daily does not use any other anticoagulation     -stable; follow-up with cardiology offices accordingly  Hyponatremia-resolved as of 4/14/2022  Assessment & Plan  Developed mild hyponatremia during his hospital stay     -result today per a m  labs  Benign prostatic hyperplasia  Assessment & Plan  This is a chronic condition    Stable      · patient does not have any urinary complaints  · currently takes Flomax and Proscar at home  · Follow-up with PCP accordingly  Discharging Physician / Practitioner: Amee Gomes MD  PCP: Carlos Anton DO  Admission Date:   Admission Orders (From admission, onward)     Ordered        04/10/22 1959  Inpatient Admission  Once                      Discharge Date: 04/14/22    Medical Problems             Resolved Problems  Date Reviewed: 4/13/2022          Resolved    Suicidal ideation 4/11/2022     Resolved by  Amee Gomes MD    * (Principal) Sepsis without acute organ dysfunction ([address] ) 4/14/2022     Resolved by  Amee Gomes MD    Hyponatremia 4/14/2022     Resolved by  Amee Gomes MD                Consultations During Hospital Stay:  · Case management  Procedures Performed:   CT abdomen pelvis with contrast   Final Result      1  No acute gastrointestinal findings  2   There is partially visualized dense masslike consolidation in the left lower lobe posteriorly suspicious for pneumonia  Recommend follow-up chest CT in 4-6 weeks to ensure resolution  3   There are a few hyperdense nodular foci in the liver, not definitely seen on the prior exam   While these could represent small flash filling hemangiomas, further evaluation is recommended with dedicated MRI with and without IV contrast if feasible    vs triple phase CT of the liver  The study was marked in [facility] for immediate notification  Workstation performed: [id number]         XR chest 1 view portable   ED Interpretation   Possible consolidation left lower lung field      Final Result   Mild cardiomegaly      No acute cardiopulmonary disease                    Workstation performed: [id number]             Significant Findings / Test Results:   Results for orders placed or performed during the hospital encounter of 04/10/22   Peggy Sevilla   Result Value Ref Range    Supplier Name [facility]/[address]     Supplier Phone Number [phone]     Order Status Pending Delivery Ticket Delivery Note      Delivery Request Date 04/12/2022     Item Description [facility], Adult    COVID/FLU/RSV - 2 hour TAT    Specimen: Nose; Nares   Result Value Ref Range    SARS-CoV-2 Negative Negative    INFLUENZA A PCR Negative Negative    INFLUENZA B PCR Negative Negative    RSV PCR Negative Negative   Blood culture #1    Specimen: Arm, Left; Blood   Result Value Ref Range    Blood Culture No Growth at 72 hrs  Blood culture #2    Specimen: Arm, Right; Blood   Result Value Ref Range    Blood Culture No Growth at 72 hrs  Strep Pneumoniae, Urine    Specimen: Urine, Other   Result Value Ref Range    Strep pneumoniae antigen, urine Negative Negative   Legionella antigen, Urine    Specimen: Urine, Other   Result Value Ref Range    Legionella Urinary Antigen Negative Negative   Sputum culture and Gram stain    Specimen: Expectorated Sputum   Result Value Ref Range    Sputum Culture Test not performed  Suggest repeat specimen  Gram Stain Result (A)      >10 squamous epithelial cells/lpf, indicating orophayngeal contamination      Gram Stain Result 2+ Polys (A)     Gram Stain Result (A)      2+ Gram positive cocci in pairs, chains and clusters    Gram Stain Result 2+ Gram positive rods (A)     Gram Stain Result 1+ Gram negative rods (A)     Gram Stain Result Rare Gram negative diplococci (A)    CBC and differential   Result Value Ref Range    WBC 24 81 (H) 4 31 - 10 16 Thousand/uL    RBC 3 86 (L) 3 88 - 5 62 Million/uL    Hemoglobin 12 4 12 0 - 17 0 g/dL    Hematocrit 37 4 36 5 - 49 3 %    MCV 97 82 - 98 fL    MCH 32 1 26 8 - 34 3 pg    MCHC 33 2 31 4 - 37 4 g/dL    RDW 13 8 11 6 - 15 1 %    MPV 11 1 8 9 - 12 7 fL    Platelets 982 (L) 533 - 390 Thousands/uL   Protime-INR   Result Value Ref Range    Protime 16 6 (H) 11 6 - 14 5 seconds    INR 1 42 (H) 0 84 - 1 19   APTT   Result Value Ref Range    PTT 48 (H) 23 - 37 seconds   Comprehensive metabolic panel   Result Value Ref Range    Sodium 132 (L) 136 - 145 mmol/L    Potassium 4 1 3 5 - 5 3 mmol/L    Chloride 97 (L) 100 - 108 mmol/L    CO2 24 21 - 32 mmol/L    ANION GAP 11 4 - 13 mmol/L    BUN 32 (H) 5 - 25 mg/dL    Creatinine 1 28 0 60 - 1 30 mg/dL    Glucose 140 65 - 140 mg/dL    Calcium 8 9 8 3 - 10 1 mg/dL    AST 21 5 - 45 U/L    ALT 24 12 - 78 U/L    Alkaline Phosphatase 62 46 - 116 U/L    Total Protein 7 8 6 4 - 8 2 g/dL    Albumin 3 5 3 5 - 5 0 g/dL    Total Bilirubin 2 15 (H) 0 20 - 1 00 mg/dL    eGFR 47 ml/min/1 73sq m   Magnesium   Result Value Ref Range    Magnesium 2 2 1 6 - 2 6 mg/dL   Lipase   Result Value Ref Range    Lipase 43 (L) 73 - 393 u/L   Lactic acid   Result Value Ref Range    LACTIC ACID 2 2 (HH) 0 5 - 2 0 mmol/L   HS Troponin 0hr (reflex protocol)   Result Value Ref Range    hs TnI 0hr 27 "Refer to ACS Flowchart"- see link ng/L   Procalcitonin   Result Value Ref Range    Procalcitonin 0 37 (H) <=0 25 ng/ml   HS Troponin I 2hr   Result Value Ref Range    hs TnI 2hr 34 "Refer to ACS Flowchart"- see link ng/L    Delta 2hr hsTnI 7 <20 ng/L   Lactic acid 2 Hours   Result Value Ref Range    LACTIC ACID 1 4 0 5 - 2 0 mmol/L   HS Troponin I 4hr   Result Value Ref Range    hs TnI 4hr 33 "Refer to ACS Flowchart"- see link ng/L    Delta 4hr hsTnI 6 <20 ng/L   Blood gas, venous   Result Value Ref Range    pH, Roddy 7 367 7 300 - 7 400    pCO2, Roddy 42 7 42 0 - 50 0 mm Hg    pO2, Roddy 30 0 (L) 35 0 - 45 0 mm Hg    HCO3, Roddy 24 0 24 - 30 mmol/L    Base Excess, Roddy -1 4 mmol/L    O2 Content, Roddy 9 8 ml/dL    O2 HGB, VENOUS 56 0 (L) 60 0 - 80 0 %   Platelet count   Result Value Ref Range    Platelets 392 (L) 339 - 390 Thousands/uL    MPV 10 7 8 9 - 12 7 fL   Procalcitonin, Next Day AM Collection   Result Value Ref Range    Procalcitonin 0 51 (H) <=0 25 ng/ml   Comprehensive metabolic panel, AM Draw, Tomorrow   Result Value Ref Range    Sodium 133 (L) 136 - 145 mmol/L    Potassium 4 0 3 5 - 5 3 mmol/L    Chloride 100 100 - 108 mmol/L    CO2 24 21 - 32 mmol/L ANION GAP 9 4 - 13 mmol/L    BUN 30 (H) 5 - 25 mg/dL    Creatinine 1 14 0 60 - 1 30 mg/dL    Glucose 115 65 - 140 mg/dL    Calcium 8 1 (L) 8 3 - 10 1 mg/dL    Corrected Calcium 9 1 8 3 - 10 1 mg/dL    AST 23 5 - 45 U/L    ALT 23 12 - 78 U/L    Alkaline Phosphatase 51 46 - 116 U/L    Total Protein 6 5 6 4 - 8 2 g/dL    Albumin 2 8 (L) 3 5 - 5 0 g/dL    Total Bilirubin 1 43 (H) 0 20 - 1 00 mg/dL    eGFR 55 ml/min/1 73sq m   Magnesium, AM Draw, Tomorrow   Result Value Ref Range    Magnesium 2 1 1 6 - 2 6 mg/dL   Phosphorus, AM Draw, Tomorrow   Result Value Ref Range    Phosphorus 2 2 (L) 2 3 - 4 1 mg/dL   CBC and differential, AM Draw, Tomorrow   Result Value Ref Range    WBC 21 22 (H) 4 31 - 10 16 Thousand/uL    RBC 3 32 (L) 3 88 - 5 62 Million/uL    Hemoglobin 10 7 (L) 12 0 - 17 0 g/dL    Hematocrit 32 2 (L) 36 5 - 49 3 %    MCV 97 82 - 98 fL    MCH 32 2 26 8 - 34 3 pg    MCHC 33 2 31 4 - 37 4 g/dL    RDW 13 8 11 6 - 15 1 %    MPV 10 9 8 9 - 12 7 fL    Platelets 413 (L) 446 - 390 Thousands/uL    nRBC 0 /100 WBCs    Neutrophils Relative 89 (H) 43 - 75 %    Immat GRANS % 2 0 - 2 %    Lymphocytes Relative 3 (L) 14 - 44 %    Monocytes Relative 6 4 - 12 %    Eosinophils Relative 0 0 - 6 %    Basophils Relative 0 0 - 1 %    Neutrophils Absolute 18 83 (H) 1 85 - 7 62 Thousands/µL    Immature Grans Absolute 0 49 (H) 0 00 - 0 20 Thousand/uL    Lymphocytes Absolute 0 56 (L) 0 60 - 4 47 Thousands/µL    Monocytes Absolute 1 32 (H) 0 17 - 1 22 Thousand/µL    Eosinophils Absolute 0 00 0 00 - 0 61 Thousand/µL    Basophils Absolute 0 02 0 00 - 0 10 Thousands/µL   CBC and differential   Result Value Ref Range    WBC 22 71 (H) 4 31 - 10 16 Thousand/uL    RBC 3 28 (L) 3 88 - 5 62 Million/uL    Hemoglobin 10 6 (L) 12 0 - 17 0 g/dL    Hematocrit 31 5 (L) 36 5 - 49 3 %    MCV 96 82 - 98 fL    MCH 32 3 26 8 - 34 3 pg    MCHC 33 7 31 4 - 37 4 g/dL    RDW 13 7 11 6 - 15 1 %    MPV 11 1 8 9 - 12 7 fL    Platelets 915 (L) 882 - 390 Thousands/uL   Procalcitonin   Result Value Ref Range    Procalcitonin 0 70 (H) <=0 25 ng/ml   CBC and differential   Result Value Ref Range    WBC 19 56 (H) 4 31 - 10 16 Thousand/uL    RBC 3 37 (L) 3 88 - 5 62 Million/uL    Hemoglobin 10 8 (L) 12 0 - 17 0 g/dL    Hematocrit 32 3 (L) 36 5 - 49 3 %    MCV 96 82 - 98 fL    MCH 32 0 26 8 - 34 3 pg    MCHC 33 4 31 4 - 37 4 g/dL    RDW 13 5 11 6 - 15 1 %    MPV 11 4 8 9 - 12 7 fL    Platelets 164 (L) 913 - 390 Thousands/uL   Comprehensive metabolic panel   Result Value Ref Range    Sodium 133 (L) 136 - 145 mmol/L    Potassium 3 8 3 5 - 5 3 mmol/L    Chloride 100 100 - 108 mmol/L    CO2 25 21 - 32 mmol/L    ANION GAP 8 4 - 13 mmol/L    BUN 32 (H) 5 - 25 mg/dL    Creatinine 1 17 0 60 - 1 30 mg/dL    Glucose 105 65 - 140 mg/dL    Calcium 8 4 8 3 - 10 1 mg/dL    Corrected Calcium 9 6 8 3 - 10 1 mg/dL    AST 73 (H) 5 - 45 U/L    ALT 75 12 - 78 U/L    Alkaline Phosphatase 64 46 - 116 U/L    Total Protein 6 5 6 4 - 8 2 g/dL    Albumin 2 5 (L) 3 5 - 5 0 g/dL    Total Bilirubin 0 79 0 20 - 1 00 mg/dL    eGFR 53 ml/min/1 73sq m   Procalcitonin   Result Value Ref Range    Procalcitonin 0 49 (H) <=0 25 ng/ml   CBC and differential   Result Value Ref Range    WBC 17 48 (H) 4 31 - 10 16 Thousand/uL    RBC 3 30 (L) 3 88 - 5 62 Million/uL    Hemoglobin 10 6 (L) 12 0 - 17 0 g/dL    Hematocrit 31 7 (L) 36 5 - 49 3 %    MCV 96 82 - 98 fL    MCH 32 1 26 8 - 34 3 pg    MCHC 33 4 31 4 - 37 4 g/dL    RDW 13 6 11 6 - 15 1 %    MPV 11 3 8 9 - 12 7 fL    Platelets 652 (L) 951 - 390 Thousands/uL    nRBC 0 /100 WBCs    Neutrophils Relative 86 (H) 43 - 75 %    Immat GRANS % 5 (H) 0 - 2 %    Lymphocytes Relative 5 (L) 14 - 44 %    Monocytes Relative 3 (L) 4 - 12 %    Eosinophils Relative 1 0 - 6 %    Basophils Relative 0 0 - 1 %    Neutrophils Absolute 15 01 (H) 1 85 - 7 62 Thousands/µL    Immature Grans Absolute >0 50 (H) 0 00 - 0 20 Thousand/uL    Lymphocytes Absolute 0 83 0 60 - 4 47 Thousands/µL Monocytes Absolute 0 58 0 17 - 1 22 Thousand/µL    Eosinophils Absolute 0 15 0 00 - 0 61 Thousand/µL    Basophils Absolute 0 05 0 00 - 0 10 Thousands/µL   Comprehensive metabolic panel   Result Value Ref Range    Sodium 136 136 - 145 mmol/L    Potassium 3 5 3 5 - 5 3 mmol/L    Chloride 103 100 - 108 mmol/L    CO2 25 21 - 32 mmol/L    ANION GAP 8 4 - 13 mmol/L    BUN 23 5 - 25 mg/dL    Creatinine 1 01 0 60 - 1 30 mg/dL    Glucose 101 65 - 140 mg/dL    Calcium 8 2 (L) 8 3 - 10 1 mg/dL    Corrected Calcium 9 5 8 3 - 10 1 mg/dL     (H) 5 - 45 U/L     (H) 12 - 78 U/L    Alkaline Phosphatase 67 46 - 116 U/L    Total Protein 6 1 (L) 6 4 - 8 2 g/dL    Albumin 2 4 (L) 3 5 - 5 0 g/dL    Total Bilirubin 0 68 0 20 - 1 00 mg/dL    eGFR 63 ml/min/1 73sq m   Magnesium   Result Value Ref Range    Magnesium 2 3 1 6 - 2 6 mg/dL   Phosphorus   Result Value Ref Range    Phosphorus 2 1 (L) 2 3 - 4 1 mg/dL   Procalcitonin   Result Value Ref Range    Procalcitonin 0 31 (H) <=0 25 ng/ml   Protime-INR   Result Value Ref Range    Protime 16 2 (H) 11 6 - 14 5 seconds    INR 1 37 (H) 0 84 - 1 19   ECG 12 lead   Result Value Ref Range    Ventricular Rate 72 BPM    Atrial Rate 75 BPM    MT Interval  ms    QRSD Interval 132 ms    QT Interval 442 ms    QTC Interval 483 ms    P Axis  degrees    QRS Axis -75 degrees    T Wave Axis 98 degrees   ECG 12 lead   Result Value Ref Range    Ventricular Rate 69 BPM    Atrial Rate 326 BPM    MT Interval  ms    QRSD Interval 162 ms    QT Interval 458 ms    QTC Interval 490 ms    P Axis  degrees    QRS Axis -76 degrees    T Wave Axis 108 degrees   Manual Differential(PHLEBS Do Not Order)   Result Value Ref Range    Segmented % 87 (H) 43 - 75 %    Bands % 7 0 - 8 %    Lymphocytes % 1 (L) 14 - 44 %    Monocytes % 5 4 - 12 %    Eosinophils, % 0 0 - 6 %    Basophils % 0 0 - 1 %    Absolute Neutrophils 23 32 (H) 1 85 - 7 62 Thousand/uL    Lymphocytes Absolute 0 25 (L) 0 60 - 4 47 Thousand/uL Monocytes Absolute 1 24 (H) 0 00 - 1 22 Thousand/uL    Eosinophils Absolute 0 00 0 00 - 0 40 Thousand/uL    Basophils Absolute 0 00 0 00 - 0 10 Thousand/uL    Total Counted      Anisocytosis Present     Platelet Estimate Borderline (A) Adequate   Manual Differential(PHLEBS Do Not Order)   Result Value Ref Range    Segmented % 87 (H) 43 - 75 %    Bands % 5 0 - 8 %    Lymphocytes % 4 (L) 14 - 44 %    Monocytes % 4 4 - 12 %    Eosinophils, % 0 0 - 6 %    Basophils % 0 0 - 1 %    Absolute Neutrophils 20 89 (H) 1 85 - 7 62 Thousand/uL    Lymphocytes Absolute 0 91 0 60 - 4 47 Thousand/uL    Monocytes Absolute 0 91 0 00 - 1 22 Thousand/uL    Eosinophils Absolute 0 00 0 00 - 0 40 Thousand/uL    Basophils Absolute 0 00 0 00 - 0 10 Thousand/uL    Total Counted      Anisocytosis Present     Platelet Estimate Borderline (A) Adequate   Manual Differential(PHLEBS Do Not Order)   Result Value Ref Range    Segmented % 69 43 - 75 %    Bands % 16 (H) 0 - 8 %    Lymphocytes % 5 (L) 14 - 44 %    Monocytes % 4 4 - 12 %    Eosinophils, % 1 0 - 6 %    Basophils % 0 0 - 1 %    Myelocytes % 1 0 - 1 %    Atypical Lymphocytes % 4 (H) <=0 %    Absolute Neutrophils 16 63 (H) 1 85 - 7 62 Thousand/uL    Lymphocytes Absolute 0 98 0 60 - 4 47 Thousand/uL    Monocytes Absolute 0 78 0 00 - 1 22 Thousand/uL    Eosinophils Absolute 0 20 0 00 - 0 40 Thousand/uL    Basophils Absolute 0 00 0 00 - 0 10 Thousand/uL    Total Counted      Anisocytosis Present     Macrocytes Present     Platelet Estimate Borderline (A) Adequate    Large Platelet Present      ·     Incidental Findings:   · none     Test Results Pending at Discharge (will require follow up):   · none     Outpatient Tests Requested:  · none    Complications:  none    Reason for Admission:  Sepsis with pneumonia  Hospital Course:     Patient was admitted to the hospital due to sepsis top of lobar pneumonia  CT chest showed left lower lobe consolidation    Throughout hospital stay he was treated IV ceftriaxone per guidelines  Continue to improve  Daily blood count and pro Francis and clinical picture  Patient remained stable throughout his hospitalization time  HPI per admission  Richard Perales is a 80 y o  male patient who originally presented to the hospital on 4/10/2022  with a PMH of AFib, BPH, hypertension who presents with nausea and generalized weakness x2 days  The patient states that roughly 2 days ago he began to feel very weak and nauseous  He states that over the past several days he has felt unwell lightheaded  He states he has had difficulty with ambulation at home  He has also had some falls  He denies any head strike or loss of consciousness  He reports decreased oral intake  He states he has been able to drink some water but has had no appetite he over the past several days  He denies any fevers or chills  He does report a mild productive cough in the morning  He denies any episodes of vomiting  He has had no known sick contacts  He denies any chest pain, shortness a breath  Denies any abdominal pain  Denies any diarrhea or constipation  Denies any headaches, fevers, chills  Please see above list of diagnoses and related plan for additional information  Condition at Discharge: good     Discharge Day Visit / Exam:     Subjective:  Patient was seen today at bedside  Does not have any active complaints  No shortness of breath, chest tightness or pain  He is tolerating oral diet  Stooling and voiding accordingly  Not in acute distress, oxygen saturation within normal limits on room air    Vitals: Blood Pressure: 147/61 (04/14/22 0716)  Pulse: 60 (04/14/22 0716)  Temperature: (!) 97 1 °F (36 2 °C) (04/14/22 0716)  Temp Source: Temporal (04/14/22 0716)  Respirations: 17 (04/14/22 0716)  Height: 5' 4" (162 6 cm) (04/10/22 2121)  Weight - Scale: 87 3 kg (192 lb 7 4 oz) (04/10/22 2121)  SpO2: 90 % (04/14/22 0716)  Exam:   Physical Exam  Vitals and nursing note reviewed  Constitutional:       General: He is not in acute distress  Appearance: Normal appearance  He is normal weight  He is not ill-appearing, toxic-appearing or diaphoretic  HENT:      Head: Normocephalic and atraumatic  Mouth/Throat:      Mouth: Mucous membranes are moist       Pharynx: Oropharynx is clear  No oropharyngeal exudate  Eyes:      Conjunctiva/sclera: Conjunctivae normal       Pupils: Pupils are equal, round, and reactive to light  Cardiovascular:      Rate and Rhythm: Normal rate  Pulses: Normal pulses  Radial pulses are 2+ on the right side and 2+ on the left side  Heart sounds: Normal heart sounds  No murmur heard  Pulmonary:      Effort: Pulmonary effort is normal  No tachypnea, bradypnea or accessory muscle usage  Breath sounds: Normal air entry  Examination of the left-lower field reveals rales  Rales present  No decreased breath sounds, wheezing or rhonchi  Abdominal:      General: Abdomen is flat  Bowel sounds are normal  There is no distension  Palpations: Abdomen is soft  Tenderness: There is no abdominal tenderness  Musculoskeletal:      Right lower leg: No edema  Left lower leg: No edema  Skin:     Capillary Refill: Capillary refill takes less than 2 seconds  Neurological:      General: No focal deficit present  Mental Status: He is alert  Mental status is at baseline  Psychiatric:         Mood and Affect: Mood normal          Behavior: Behavior normal          Thought Content: Thought content normal          Discussion with Family:  Patient  Discharge instructions/Information to patient and family:   See after visit summary for information provided to patient and family  Provisions for Follow-Up Care:  See after visit summary for information related to follow-up care and any pertinent home health orders  Disposition:     Other: Home health aide      For Discharges to [facility] SNF: · Not Applicable to this Patient - Not Applicable to this Patient    Planned Readmission:  None  Discharge Statement:  I spent 60  minutes discharging the patient  This time was spent on the day of discharge  I had direct contact with the patient on the day of discharge  Greater than 50% of the total time was spent examining patient, answering all patient questions, arranging and discussing plan of care with patient as well as directly providing post-discharge instructions  Additional time then spent on discharge activities  Discharge Medications:  See after visit summary for reconciled discharge medications provided to patient and family        ** Please Note: This note has been constructed using a voice recognition system **

## 2022-04-14 NOTE — NURSING NOTE
Reviewed AVS with patient and patient's daughter- no questions at this time   Patient left in wheelchair in stable condition

## 2022-04-16 LAB
BACTERIA BLD CULT: NORMAL
BACTERIA BLD CULT: NORMAL

## 2022-04-21 ENCOUNTER — TELEPHONE (OUTPATIENT)
Dept: FAMILY MEDICINE CLINIC | Facility: CLINIC | Age: 87
End: 2022-04-21

## 2022-04-21 DIAGNOSIS — R33.9 URINARY RETENTION: ICD-10-CM

## 2022-04-21 RX ORDER — FINASTERIDE 5 MG/1
5 TABLET, FILM COATED ORAL DAILY
Qty: 90 TABLET | Refills: 1 | Status: SHIPPED | OUTPATIENT
Start: 2022-04-21 | End: 2022-07-28

## 2022-04-26 ENCOUNTER — OFFICE VISIT (OUTPATIENT)
Dept: FAMILY MEDICINE CLINIC | Facility: CLINIC | Age: 87
End: 2022-04-26
Payer: MEDICARE

## 2022-04-26 VITALS
BODY MASS INDEX: 31.07 KG/M2 | TEMPERATURE: 97.9 F | SYSTOLIC BLOOD PRESSURE: 118 MMHG | HEART RATE: 60 BPM | RESPIRATION RATE: 18 BRPM | WEIGHT: 182 LBS | DIASTOLIC BLOOD PRESSURE: 70 MMHG | HEIGHT: 64 IN | OXYGEN SATURATION: 95 %

## 2022-04-26 DIAGNOSIS — D69.6 PLATELETS DECREASED (HCC): ICD-10-CM

## 2022-04-26 DIAGNOSIS — J18.9 PNEUMONIA OF LEFT LOWER LOBE DUE TO INFECTIOUS ORGANISM: Primary | ICD-10-CM

## 2022-04-26 DIAGNOSIS — I48.21 PERMANENT ATRIAL FIBRILLATION (HCC): ICD-10-CM

## 2022-04-26 PROCEDURE — 99496 TRANSJ CARE MGMT HIGH F2F 7D: CPT | Performed by: INTERNAL MEDICINE

## 2022-04-26 NOTE — PROGRESS NOTES
Assessment/Plan:         Diagnoses and all orders for this visit:    Pneumonia of left lower lobe due to infectious organism  -     CT chest wo contrast; Future  -     Basic metabolic panel; Future  Pt improving  Homecare and home therapy in place  Increase activity/do exercises at home  IS  Pt finished antibx from D[facility]  CT chest 4 weeks ordered     Platelets decreased (HCC)  -     CBC and Platelet; Future  Recheck cbc with plt   No s/s bleeding disorder    Permanent atrial fibrillation (HCC)  Stable and cardio saw pt in the hospital No changes made         July/prn     Patient ID: Alan Cloud is a 80 y o  male  HPI  Pt recently hospitalized with LLL pneumonia He is home and improving day to day Has homecare, home therapy and his daughter checks in as well as a neighbor He had several days of nausea and weakness prior to going to the hospital He is feeling better than he was and is eating and drinking now Sodium was low during admission but improved at DC No n/v/d Finished antibiotic per DC No diarrhea No falls Has home therapy and is doing IS and home exercises daily He has some left lower thoracic frontal area discomfort at times but no cough of significance No fever or chills No sob No dizziness Sleeping normally but does nap during the day     TCM Call (since 3/26/2022)     Date and time call was made  4/14/2022 12:33 PM    Hospital care reviewed  Records reviewed        Patient was hospitialized at  [address] [place] [address]        Date of Admission  04/10/22    Date of discharge  04/14/22    Diagnosis  sepsis, pneumonia left lower lobe    Disposition  Home; [facility] services    Were the patients medications reviewed and updated  No    Current Symptoms  None      TCM Call (since 3/26/2022)     Post hospital issues  None    Scheduled for follow up?   Yes    Did you obtain your prescribed medications  Yes    Do you need help managing your prescriptions or medications  No    Is transportation to your appointment needed  Yes  Daughter Migel Gonzalez driving due to age related restrictions    I have advised the patient to call PCP with any new or worsening symptoms  Jeevan Crawford,     [facility]; Home care staff; [place]    The type of support provided  Physical; Emotional    Do you have social support  Yes, a little    Are you recieving any outpatient services  No    Are you recieving home care services  Yes    Types of home care services  Home health aid; Home PT    Are you using any community resources  No    Current waiver services  No    Interperter language line needed  No    Counseling  Patient; Family          Review of Systems   Constitutional: Negative for chills and fever  HENT: Negative  Respiratory: Negative for cough and shortness of breath  Cardiovascular: Negative for chest pain, palpitations and leg swelling  Gastrointestinal: Negative for abdominal distention and abdominal pain  Genitourinary: Negative  Musculoskeletal: Positive for arthralgias  Neurological: Negative for dizziness, light-headedness and headaches  Psychiatric/Behavioral: Negative for sleep disturbance  The patient is not nervous/anxious          Past Medical History:   Diagnosis Date    Arthritis     Atrial fibrillation (HCC)     Last Assessed:8/10/17    Benign prostatic hyperplasia     Last Assessed:1/28/14    BPH (benign prostatic hyperplasia)     Complete atrioventricular block (HCC)     Diverticulosis     Last Assessed:4/30/13    Hypercholesterolemia     Hypertension     Hyponatremia 4/10/2022    Paroxysmal ventricular tachycardia (HCC)     Last Assessed:7/20/17    Prostatitis     Last Assessed:12/19/12    Pulmonary artery hypertension (HCC)     mild pulmonary htn    Retention, urine     Right bundle branch block     Last Assessed:11/26/13    Sepsis without acute organ dysfunction ([facility] [address] ) 4/10/2022    Suicidal ideation 4/10/2022  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     Last [id number]:     Past Surgical History:   Procedure Laterality Date    CARDIAC PACEMAKER PLACEMENT      [facility] PPM    COLONOSCOPY      HEMORRHOID SURGERY      Cauterization of hemorrhoids    HERNIA REPAIR      INCISION AND DRAINAGE ABSCESS / HEMATOMA OF BURSA / KNEE / Eladio Cram      Fatty Tumor Removed    KNEE SURGERY Left     Steel Removes from left knee     Social History     Socioeconomic History    Marital status:      Spouse name: Not on file    Number of children: Not on file    Years of education: Not on file    Highest education level: Not on file   Occupational History    Occupation: retired   Tobacco Use    Smoking status: Former Smoker     Quit date:      Years since quittin 3    Smokeless tobacco: Never Used   Vaping Use    Vaping Use: Never used   Substance and Sexual Activity    Alcohol use: Not Currently     Alcohol/week: 0 0 standard drinks     Comment: social drinker    Drug use: No    Sexual activity: Not on file   Other Topics Concern    Not on file   Social History Narrative    Advanced directive discussed with patient    Always uses seatbelt    Always uses sunscreen    Caffeine use- 1 soda on occasion    Regular dental care     Social Determinants of Health     Financial Resource Strain: Not on file   Food Insecurity: No Food Insecurity    Worried About Running Out of Food in the Last Year: Never true    Chaim of Food in the Last Year: Never true   Transportation Needs: No Transportation Needs    Lack of Transportation (Medical): No    Lack of Transportation (Non-Medical):  No   Physical Activity: Not on file   Stress: Not on file   Social Connections: Not on file   Intimate Partner Violence: Not on file   Housing Stability: Unknown    Unable to Pay for Housing in the Last Year: No    Number of Places Lived in the Last Year: Not on file    Unstable Housing in the Last Year: No     Allergies Allergen Reactions    Ibuprofen Hives            /70   Pulse 60   Temp 97 9 °F (36 6 °C) (Temporal)   Resp 18   Ht 5' 4" (1 626 m)   Wt 82 6 kg (182 lb)   SpO2 95%   BMI 31 24 kg/m²          Physical Exam  Vitals reviewed  Constitutional:       General: He is not in acute distress  Appearance: Normal appearance  He is not ill-appearing, toxic-appearing or diaphoretic  HENT:      Head: Normocephalic and atraumatic  Right Ear: External ear normal       Left Ear: External ear normal       Nose: Nose normal       Mouth/Throat:      Mouth: Mucous membranes are dry  Eyes:      General: No scleral icterus  Extraocular Movements: Extraocular movements intact  Conjunctiva/sclera: Conjunctivae normal       Pupils: Pupils are equal, round, and reactive to light  Cardiovascular:      Rate and Rhythm: Normal rate and regular rhythm  Pulses: Normal pulses  Pulmonary:      Effort: Pulmonary effort is normal  No respiratory distress  Breath sounds: Normal breath sounds  No wheezing  Abdominal:      General: Bowel sounds are normal  There is no distension  Palpations: Abdomen is soft  Tenderness: There is no abdominal tenderness  Musculoskeletal:      Cervical back: Normal range of motion and neck supple  No rigidity  Right lower leg: No edema  Left lower leg: No edema  Lymphadenopathy:      Cervical: No cervical adenopathy  Skin:     General: Skin is dry  Coloration: Skin is not jaundiced or pale  Neurological:      General: No focal deficit present  Mental Status: He is alert and oriented to person, place, and time  Mental status is at baseline  Cranial Nerves: No cranial nerve deficit  Motor: Weakness present  Psychiatric:         Mood and Affect: Mood normal          Behavior: Behavior normal          Thought Content:  Thought content normal          Judgment: Judgment normal

## 2022-04-29 LAB
DME PARACHUTE DELIVERY DATE ACTUAL: NORMAL
DME PARACHUTE DELIVERY DATE REQUESTED: NORMAL
DME PARACHUTE ITEM DESCRIPTION: NORMAL
DME PARACHUTE ORDER STATUS: NORMAL
DME PARACHUTE SUPPLIER NAME: NORMAL
DME PARACHUTE SUPPLIER PHONE: NORMAL

## 2022-05-11 ENCOUNTER — TELEPHONE (OUTPATIENT)
Dept: FAMILY MEDICINE CLINIC | Facility: CLINIC | Age: 87
End: 2022-05-11

## 2022-05-11 DIAGNOSIS — B37.0 THRUSH: Primary | ICD-10-CM

## 2022-05-11 RX ORDER — CLOTRIMAZOLE 10 MG/1
10 LOZENGE ORAL; TOPICAL 3 TIMES DAILY
Qty: 30 TROCHE | Refills: 0 | Status: SHIPPED | OUTPATIENT
Start: 2022-05-11

## 2022-05-26 ENCOUNTER — LAB (OUTPATIENT)
Dept: LAB | Facility: HOSPITAL | Age: 87
End: 2022-05-26
Attending: INTERNAL MEDICINE
Payer: MEDICARE

## 2022-05-26 ENCOUNTER — HOSPITAL ENCOUNTER (OUTPATIENT)
Dept: CT IMAGING | Facility: HOSPITAL | Age: 87
Discharge: HOME | End: 2022-05-26
Attending: INTERNAL MEDICINE
Payer: MEDICARE

## 2022-05-26 DIAGNOSIS — J18.9 PNEUMONIA OF LEFT LOWER LOBE DUE TO INFECTIOUS ORGANISM: ICD-10-CM

## 2022-05-26 DIAGNOSIS — D69.6 PLATELETS DECREASED (HCC): ICD-10-CM

## 2022-05-26 LAB
ANION GAP SERPL CALCULATED.3IONS-SCNC: 7 MMOL/L (ref 4–13)
BUN SERPL-MCNC: 25 MG/DL (ref 5–25)
CALCIUM SERPL-MCNC: 9.4 MG/DL (ref 8.3–10.1)
CHLORIDE SERPL-SCNC: 101 MMOL/L (ref 100–108)
CO2 SERPL-SCNC: 29 MMOL/L (ref 21–32)
CREAT SERPL-MCNC: 1.17 MG/DL (ref 0.6–1.3)
ERYTHROCYTE [DISTWIDTH] IN BLOOD BY AUTOMATED COUNT: 14.3 % (ref 11.6–15.1)
GFR SERPL CREATININE-BSD FRML MDRD: 53 ML/MIN/1.73SQ M
GLUCOSE SERPL-MCNC: 93 MG/DL (ref 65–140)
HCT VFR BLD AUTO: 39.8 % (ref 36.5–49.3)
HGB BLD-MCNC: 12.8 G/DL (ref 12–17)
MCH RBC QN AUTO: 31.6 PG (ref 26.8–34.3)
MCHC RBC AUTO-ENTMCNC: 32.2 G/DL (ref 31.4–37.4)
MCV RBC AUTO: 98 FL (ref 82–98)
PLATELET # BLD AUTO: 194 THOUSANDS/UL (ref 149–390)
PMV BLD AUTO: 10.2 FL (ref 8.9–12.7)
POTASSIUM SERPL-SCNC: 4.7 MMOL/L (ref 3.5–5.3)
RBC # BLD AUTO: 4.05 MILLION/UL (ref 3.88–5.62)
SODIUM SERPL-SCNC: 137 MMOL/L (ref 136–145)
WBC # BLD AUTO: 14.88 THOUSAND/UL (ref 4.31–10.16)

## 2022-05-26 PROCEDURE — 71250 CT THORAX DX C-: CPT

## 2022-05-26 PROCEDURE — 80048 BASIC METABOLIC PNL TOTAL CA: CPT

## 2022-05-26 PROCEDURE — 85027 COMPLETE CBC AUTOMATED: CPT

## 2022-05-26 PROCEDURE — 36415 COLL VENOUS BLD VENIPUNCTURE: CPT

## 2022-05-29 DIAGNOSIS — R93.89 ABNORMAL CT OF THE CHEST: Primary | ICD-10-CM

## 2022-05-29 DIAGNOSIS — J18.9 PNEUMONIA OF LEFT LOWER LOBE DUE TO INFECTIOUS ORGANISM: ICD-10-CM

## 2022-06-01 ENCOUNTER — REMOTE DEVICE CLINIC VISIT (OUTPATIENT)
Dept: CARDIOLOGY CLINIC | Facility: CLINIC | Age: 87
End: 2022-06-01
Payer: MEDICARE

## 2022-06-01 DIAGNOSIS — Z95.0 PRESENCE OF CARDIAC PACEMAKER: Primary | ICD-10-CM

## 2022-06-01 PROCEDURE — 93296 REM INTERROG EVL PM/IDS: CPT | Performed by: INTERNAL MEDICINE

## 2022-06-01 PROCEDURE — 93294 REM INTERROG EVL PM/LDLS PM: CPT | Performed by: INTERNAL MEDICINE

## 2022-06-01 NOTE — PROGRESS NOTES
Results for orders placed or performed in visit on 06/01/22   Cardiac EP device report    Narrative    SJM-DUAL CHAMBER PPM (VVIR 60)/ NOT MRI CONDITIONAL  MERLIN TRANSMISSION: BATTERY VOLTAGE ADEQUATE (3 5 YRS)   99% (>40%/VVIR 60)  ALL AVAILABLE LEAD PARAMETERS WITHIN NORMAL LIMITS  NO SIGNIFICANT HIGH RATE EPISODES  PERM  AF & PATIENT ON , METOPROLOL SUCC  PATIENT REFUSES AC WHEN DISCUSSED PER SK NOTE 2/2022  PACEMAKER FUNCTIONING APPROPRIATELY    ES

## 2022-06-07 ENCOUNTER — CONSULT (OUTPATIENT)
Dept: PULMONOLOGY | Facility: CLINIC | Age: 87
End: 2022-06-07
Payer: MEDICARE

## 2022-06-07 VITALS
WEIGHT: 185.6 LBS | TEMPERATURE: 96.2 F | OXYGEN SATURATION: 95 % | DIASTOLIC BLOOD PRESSURE: 79 MMHG | HEIGHT: 74 IN | HEART RATE: 59 BPM | BODY MASS INDEX: 23.82 KG/M2 | SYSTOLIC BLOOD PRESSURE: 131 MMHG

## 2022-06-07 DIAGNOSIS — J18.9 PNEUMONIA OF LEFT LOWER LOBE DUE TO INFECTIOUS ORGANISM: Primary | ICD-10-CM

## 2022-06-07 DIAGNOSIS — I48.21 PERMANENT ATRIAL FIBRILLATION (HCC): ICD-10-CM

## 2022-06-07 DIAGNOSIS — R93.89 ABNORMAL CT OF THE CHEST: ICD-10-CM

## 2022-06-07 DIAGNOSIS — I27.21 PULMONARY ARTERY HYPERTENSION (HCC): ICD-10-CM

## 2022-06-07 PROCEDURE — 99215 OFFICE O/P EST HI 40 MIN: CPT | Performed by: INTERNAL MEDICINE

## 2022-06-07 NOTE — PATIENT INSTRUCTIONS
Continue doing the breathing exercises  Call if your breathing worsens before next visit  Call if you cough up blood or mucus  Get a CT scan of your chest in 3 months  Get a breathing test before next visit  Return in about 3 months time  He may require bronchoscopy, scope test if pneumonia does not clear up

## 2022-06-07 NOTE — PROGRESS NOTES
Pulmonary Initial Visit  Lord Hill 80 y o  male MRN: [id number]  @ Encounter: [id number]      Impressions/recommendations  1  Left lower lobe infiltrate with likely represents slow to clear pneumonia history malignancy is a possibility  At the time of his initial diagnosis he was noted to have nausea and both those include history of vomiting  He really had minimal chest symptoms  During hospitalization he was treated with antibiotics follow-up CT scan done in May did not show significant resolution This certainly could represent an aspiration pneumonia  He is asymptomatic this junction  Overall he feels well as weight is stable  Given his age is overall prior healthy be reasonable to continue conservative management  Recommend he continues incentive spirometry mobilization  We will repeat a noncontrast chest CT scan 3 months from his prior imaging  If that demonstrates persistent infiltrate without significant improvement would proceed with bronchoscopy to evaluate for endobronchial lesion  I did ask him to call us if in the interim if you should develop chest symptoms including pleuritic pain hemoptysis fevers or chills or night sweats  Will check PFTs for completeness sake  2  Atrial fibrillation right bundle branch block history of pacemaker currently on anticoagulation  3  Hypernatremia resolved    Return in 3 months time    History of Present Illness   HPI:  Lord Hill is a 80 y o  male with pmhx sig for atrial fibrillation, right bundle branch block, pacemaker, and mild thrombocytopenia  Following hospitalization April 10th following an episode of nauseous without vomiting  He was fatigued and weak  There is no clear history of cough congestion sputum production  There was no pleuritic pain  He denied any fevers or chills or weight loss    CT imaging of the abdomen done at that time showed left lower lobe consolidation follow-up imaging done in May of this year showed persistent infiltration without a whole lot of improvement  There is no history of reflux or aspiration  There is no pleuritic pain there are no fevers or chills or night sweats  His weight has been stable  He has recovered somewhat from that hospitalization but has not yet back to full baseline  He has no significant dust or asbestos exposure  There are no unusual pets or animals in the house so  He really has no prior respiratory history  Review of systems:  Review of systems was completed and was otherwise negative except as listed in HPI      Historical Information   Past Medical History:   Diagnosis Date    Arthritis     Atrial fibrillation (HCC)     Last Assessed:8/10/17    Benign prostatic hyperplasia     Last Assessed:1/28/14    BPH (benign prostatic hyperplasia)     Complete atrioventricular block (HCC)     Diverticulosis     Last Assessed:4/30/13    Hypercholesterolemia     Hypertension     Hyponatremia 04/10/2022    Paroxysmal ventricular tachycardia (HCC)     Last Assessed:7/20/17    Pneumonia     Prostatitis     Last Assessed:12/19/12    Pulmonary artery hypertension (HCC)     mild pulmonary htn    Retention, urine     Right bundle branch block     Last Assessed:11/26/13    Sepsis without acute organ dysfunction ([facility] [address] ) 04/10/2022    Suicidal ideation 04/10/2022    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     Last ZHTRCRHTF:09/1/96     Past Surgical History:   Procedure Laterality Date    CARDIAC PACEMAKER PLACEMENT      St  Judes DC PPM    COLONOSCOPY      HEMORRHOID SURGERY      Cauterization of hemorrhoids    HERNIA REPAIR      INCISION AND DRAINAGE ABSCESS / HEMATOMA OF BURSA / KNEE / THIGH      Fatty Tumor Removed    KNEE SURGERY Left     Steel Removes from left knee     Family History   Problem Relation Age of Onset    Hypertension Mother     Stroke Mother         Stroke Syndrome    Other Sister         pacemaker placement    Prostate cancer Brother     Diabetes Family Social History     Socioeconomic History    Marital status:      Spouse name: None    Number of children: None    Years of education: None    Highest education level: None   Occupational History    Occupation: retired   Tobacco Use    Smoking status: Former Smoker     Quit date:      Years since quittin 4    Smokeless tobacco: Never Used   Vaping Use    Vaping Use: Never used   Substance and Sexual Activity    Alcohol use: Not Currently     Alcohol/week: 0 0 standard drinks     Comment: social drinker    Drug use: No    Sexual activity: None   Other Topics Concern    None   Social History Narrative    Advanced directive discussed with patient    Always uses seatbelt    Always uses sunscreen    Caffeine use- 1 soda on occasion    Regular dental care     Social Determinants of Health     Financial Resource Strain: Not on file   Food Insecurity: No Food Insecurity    Worried About Running Out of Food in the Last Year: Never true    Chaim of Food in the Last Year: Never true   Transportation Needs: No Transportation Needs    Lack of Transportation (Medical): No    Lack of Transportation (Non-Medical): No   Physical Activity: Not on file   Stress: Not on file   Social Connections: Not on file   Intimate Partner Violence: Not on file   Housing Stability: Unknown    Unable to Pay for Housing in the Last Year: No    Number of Places Lived in the Last Year: Not on file    Unstable Housing in the Last Year: No       Meds/Allergies   No current facility-administered medications for this visit  (Not in a hospital admission)    Allergies   Allergen Reactions    Ibuprofen Hives       Vitals: Blood pressure 131/79, pulse 59, temperature (!) 96 2 °F (35 7 °C), temperature source Tympanic, height 6' 2" (1 88 m), weight 84 2 kg (185 lb 9 6 oz), SpO2 95 %  , Body mass index is 23 83 kg/m²      Physical Exam  General:  Adult male who looks younger than his stated age, Awake alert and oriented x 3, conversant without conversational dyspnea, NO APPARENT DISTRESS, normal affect  HEENT:  PERRL, Sclera noninjected, nonicteric, Nares patent, no nasal flaring, no nasal drainage, Mucous membranes, moist, no oral lesions Mallampati 2  NECK: Trachea midline, no accessory muscle use, no stridor, no cervical or supraclavicular adenopathy  CARDIAC:  Somewhat bradycardic but regular  PULM:  Clear to percussion rales in the left base about a 3rd of the way up no evidence of consolidative findings  CHEST: No gross deformities, equal chest expansion on inspiration bilaterally  ABD: Normoactive bowel sounds, soft nontender, nondistended, no rebound, no rigidity, no guarding  EXT: No cyanosis, no clubbing, no edema, normal capillary refill  SKIN:  No rashes, no lesions  NEURO: no focal neurologic deficits, AAOx3, moving all extremities appropriately    Labs: BNP: No results found for: BNP, CBC: No results found for: WBC, HGB, HCT, MCV, PLT, ADJUSTEDWBC, MCH, MCHC, RDW, MPV, NRBC, CMP: No results found for: SODIUM, K, CL, CO2, ANIONGAP, BUN, CREATININE, GLUCOSE, CALCIUM, AST, ALT, ALKPHOS, PROT, BILITOT, EGFR, PT/INR: No results found for: PT, INR  Reviewed  Imaging and other studies: I have personally reviewed pertinent films in PACS  I reviewed imaging with patient and his daughter  X-ray show persistent left lower lobe consolidation with no clear endobronchial lesion  Under my review there is very little change from CT of abdomen month prior  Pulmonary function testing:  Not available  Not available  Echocardiogram: None available  None available  EKG, Pathology, and Other Studies: I have personally reviewed pertinent reports          Advanced Care Planning:    Sis Bhakta MD [facility]  [facility]

## 2022-07-22 DIAGNOSIS — I10 ESSENTIAL HYPERTENSION: ICD-10-CM

## 2022-07-22 RX ORDER — METOPROLOL SUCCINATE 50 MG/1
TABLET, EXTENDED RELEASE ORAL
Qty: 135 TABLET | Refills: 3 | Status: SHIPPED | OUTPATIENT
Start: 2022-07-22

## 2022-07-28 ENCOUNTER — OFFICE VISIT (OUTPATIENT)
Dept: FAMILY MEDICINE CLINIC | Facility: CLINIC | Age: 87
End: 2022-07-28
Payer: MEDICARE

## 2022-07-28 VITALS
WEIGHT: 189 LBS | DIASTOLIC BLOOD PRESSURE: 70 MMHG | HEIGHT: 74 IN | SYSTOLIC BLOOD PRESSURE: 124 MMHG | RESPIRATION RATE: 18 BRPM | HEART RATE: 78 BPM | BODY MASS INDEX: 24.26 KG/M2

## 2022-07-28 DIAGNOSIS — R33.9 URINARY RETENTION: ICD-10-CM

## 2022-07-28 DIAGNOSIS — Z12.5 ENCOUNTER FOR SCREENING FOR MALIGNANT NEOPLASM OF PROSTATE: ICD-10-CM

## 2022-07-28 DIAGNOSIS — Z11.59 ENCOUNTER FOR HEPATITIS C SCREENING TEST FOR LOW RISK PATIENT: ICD-10-CM

## 2022-07-28 DIAGNOSIS — I48.91 ATRIAL FIBRILLATION, UNSPECIFIED TYPE (HCC): ICD-10-CM

## 2022-07-28 DIAGNOSIS — R39.11 URINARY HESITANCY: ICD-10-CM

## 2022-07-28 DIAGNOSIS — D72.829 LEUKOCYTOSIS, UNSPECIFIED TYPE: ICD-10-CM

## 2022-07-28 DIAGNOSIS — Z00.00 MEDICARE ANNUAL WELLNESS VISIT, SUBSEQUENT: Primary | ICD-10-CM

## 2022-07-28 DIAGNOSIS — R93.3 ABNORMAL FINDINGS ON DIAGNOSTIC IMAGING OF OTHER PARTS OF DIGESTIVE TRACT: ICD-10-CM

## 2022-07-28 DIAGNOSIS — N18.31 STAGE 3A CHRONIC KIDNEY DISEASE (HCC): ICD-10-CM

## 2022-07-28 PROCEDURE — G0439 PPPS, SUBSEQ VISIT: HCPCS | Performed by: INTERNAL MEDICINE

## 2022-07-28 RX ORDER — FINASTERIDE 5 MG/1
5 TABLET, FILM COATED ORAL DAILY
Qty: 90 TABLET | Refills: 1 | Status: SHIPPED | OUTPATIENT
Start: 2022-07-28

## 2022-07-28 NOTE — PROGRESS NOTES
Assessment and Plan:     Problem List Items Addressed This Visit        Genitourinary    Stage 3[facility] chronic kidney disease ([facility] )       Other    Medicare annual wellness visit, subsequent - Primary      Other Visit Diagnoses     Leukocytosis, unspecified type        Relevant Orders    CBC and differential    Comprehensive metabolic panel    Atrial fibrillation, unspecified type ([facility] )        Relevant Orders    Comprehensive metabolic panel    Lipid panel    Urinary hesitancy        Relevant Orders    PSA, Total Screen    UA (URINE) with reflex to Scope    Abnormal findings on diagnostic imaging of other parts of digestive tract         Relevant Orders    Lipid panel    Encounter for screening for malignant neoplasm of prostate         Relevant Orders    PSA, Total Screen      Check labs Suspect sxs of BPH but labs/urine ordered  May need urology followup  His daughter checks in often and he is managing at this time home alone Still drives locally  Cardio due February 2023  Use cane for stability  Eye exam UTD  Has acp documents  Rto 6 months/prn    Depression Screening and Follow-up Plan: Patient was screened for depression during today's encounter  They screened negative with a PHQ-2 score of 0  Preventive health issues were discussed with patient, and age appropriate screening tests were ordered as noted in patient's After Visit Summary  Personalized health advice and appropriate referrals for health education or preventive services given if needed, as noted in patient's After Visit Summary  History of Present Illness:     Patient presents for a Medicare Wellness Visit    HPI   Patient Care Team:  Jake Fine DO as PCP - General  MD Cathy Yeager MD Brion Flaming, DO     Review of Systems:     Review of Systems   Constitutional: Negative for activity change, appetite change, chills and fever  HENT: Negative  Eyes: Negative for visual disturbance     Respiratory: Negative for cough and shortness of breath  Cardiovascular: Negative for chest pain, palpitations and leg swelling  Gastrointestinal: Negative for abdominal distention and abdominal pain  Genitourinary: Positive for decreased urine volume and difficulty urinating  Musculoskeletal: Positive for arthralgias  Neurological: Negative for dizziness, light-headedness and headaches  Psychiatric/Behavioral: Negative for sleep disturbance  The patient is not nervous/anxious           Problem List:     Patient Active Problem List   Diagnosis    Benign prostatic hyperplasia    Diverticulosis    Hypercholesterolemia    Pulmonary artery hypertension ([place] [address] )    RBBB    Medicare annual wellness visit, subsequent    Permanent atrial fibrillation ([place] [address] )    Presence of permanent cardiac pacemaker    Constipation    [facility]    Pneumonia of left lower lobe due to infectious organism    Stage 3a chronic kidney disease ([place] [address] )      Past Medical and Surgical History:     Past Medical History:   Diagnosis Date    Arthritis     Atrial fibrillation ([place] [address] )     Last Assessed:8/10/17    Benign prostatic hyperplasia     Last Assessed:1/28/14    BPH (benign prostatic hyperplasia)     Complete atrioventricular block ([place] [address] )     Diverticulosis     Last Assessed:4/30/13    Hypercholesterolemia     Hypertension     Hyponatremia 04/10/2022    Paroxysmal ventricular tachycardia (HCC)     Last Assessed:7/20/17    Pneumonia     Prostatitis     Last Assessed:12/19/12    Pulmonary artery hypertension (HCC)     mild pulmonary htn    Retention, urine     Right bundle branch block     Last Assessed:11/26/13    Sepsis without acute organ dysfunction ([place] [address] ) 04/10/2022    Suicidal ideation 04/10/2022    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     Last ECISHHISY:51/3/19     Past Surgical History:   Procedure Laterality Date   [facility] Cauterization of hemorrhoids    HERNIA REPAIR      INCISION AND DRAINAGE ABSCESS / HEMATOMA OF BURSA / KNEE / THIGH      Fatty Tumor Removed    KNEE SURGERY Left     Steel Removes from left knee      Family History:     Family History   Problem Relation Age of Onset    Hypertension Mother     Stroke Mother         Stroke Syndrome    Other Sister         pacemaker placement    Prostate cancer Brother     Diabetes Family       Social History:     Social History     Socioeconomic History    Marital status:      Spouse name: Not on file    Number of children: Not on file    Years of education: Not on file    Highest education level: Not on file   Occupational History    Occupation: retired   Tobacco Use    Smoking status: Former Smoker     Quit date:      Years since quittin 6    Smokeless tobacco: Never Used   Vaping Use    Vaping Use: Never used   Substance and Sexual Activity    Alcohol use: Not Currently     Alcohol/week: 0 0 standard drinks     Comment: social drinker    Drug use: No    Sexual activity: Not on file   Other Topics Concern    Not on file   Social History Narrative    Advanced directive discussed with patient    Always uses seatbelt    Always uses sunscreen    Caffeine use- 1 soda on occasion    Regular dental care     Social Determinants of Health     Financial Resource Strain: Not on file   Food Insecurity: No Food Insecurity    Worried About Running Out of Food in the Last Year: Never true    Chaim of Food in the Last Year: Never true   Transportation Needs: No Transportation Needs    Lack of Transportation (Medical): No    Lack of Transportation (Non-Medical):  No   Physical Activity: Not on file   Stress: Not on file   Social Connections: Not on file   Intimate Partner Violence: Not on file   Housing Stability: Unknown    Unable to Pay for Housing in the Last Year: No    Number of Places Lived in the Last Year: Not on file    Unstable Housing in the Last Year: No      Medications and Allergies:     Current Outpatient Medications   Medication Sig Dispense Refill    Ascorbic Acid (VITAMIN C) 1000 MG tablet Take 1 tablet by mouth daily      aspirin 325 mg tablet Take 1 tablet by mouth daily      Cholecalciferol (VITAMIN D3) 1000 units CAPS Take 1 tablet by mouth daily      cyanocobalamin (VITAMIN B-12) 100 mcg tablet Take 1 tablet by mouth daily      finasteride (PROSCAR) 5 mg tablet Take 1 tablet (5 mg total) by mouth daily 90 tablet 1    metoprolol succinate (TOPROL-XL) 50 mg 24 hr tablet TAKE 1 5 TABLET DAILY 135 tablet 3    Multiple Vitamins-Minerals (ICAPS AREDS 2 PO) Take by mouth      tamsulosin (FLOMAX) 0 4 mg TAKE 1 CAPSULE BY MOUTH EVERY DAY 90 capsule 5    clotrimazole (MYCELEX) 10 mg anjelica Take 1 tablet (10 mg total) by mouth in the morning and 1 tablet (10 mg total) in the evening and 1 tablet (10 mg total) before bedtime  30 Anjelica 0    Red Yeast Rice 600 MG TABS Take by mouth daily (Patient not taking: No sig reported)       No current facility-administered medications for this visit  Allergies   Allergen Reactions    Ibuprofen Hives      Immunizations:     Immunization History   Administered Date(s) Administered    COVID-19 MODERNA VACC 0 5 ML IM 01/15/2021, 02/12/2021    Influenza Split High Dose Preservative Free IM 10/10/2013, 10/31/2014, 10/31/2014, 10/13/2015, 10/12/2016, 11/16/2017, 10/14/2019    Influenza, high dose seasonal 0 7 mL 09/26/2018, 10/22/2020    Pneumococcal Conjugate 13-Valent 09/26/2018    Pneumococcal Polysaccharide PPV23 10/10/2013    TD (adult) Preservative Free 07/27/2012      Health Maintenance: There are no preventive care reminders to display for this patient  Topic Date Due    COVID-19 Vaccine (3 - Booster for Moderna series) 07/12/2021    Influenza Vaccine (1) 09/01/2022      Medicare Screening Tests and Risk Assessments:     Anabelle Zhang is here for his Subsequent Wellness visit   Last Medicare Wellness visit information reviewed, patient interviewed, no change since last AWV  Health Risk Assessment:   Patient rates overall health as good  Patient feels that their physical health rating is same  Patient is satisfied with their life  Eyesight was rated as same  Hearing was rated as slightly worse  Patient feels that their emotional and mental health rating is slightly better  Patients states they are never, rarely angry  Patient states they are sometimes unusually tired/fatigued  Pain experienced in the last 7 days has been none  Patient states that he has experienced no weight loss or gain in last 6 months  Depression Screening:   PHQ-2 Score: 0      Fall Risk Screening: In the past year, patient has experienced: no history of falling in past year      Home Safety:  Patient has trouble with stairs inside or outside of their home  Patient has working smoke alarms and has working carbon monoxide detector  Home safety hazards include: none  Nutrition:   Current diet is No Added Salt  Medications:   Patient is currently taking over-the-counter supplements  OTC medications include: see medication list  Patient is able to manage medications  Activities of Daily Living (ADLs)/Instrumental Activities of Daily Living (IADLs):   Walk and transfer into and out of bed and chair?: Yes  Dress and groom yourself?: Yes    Bathe or shower yourself?: Yes    Feed yourself? Yes  Do your laundry/housekeeping?: Yes  Manage your money, pay your bills and track your expenses?: Yes  Make your own meals?: Yes    Do your own shopping?: Yes    Previous Hospitalizations:   Any hospitalizations or ED visits within the last 12 months?: Yes    How many hospitalizations have you had in the last year?: 1-2    Advance Care Planning:   Living will: Yes    Durable POA for healthcare:  Yes    Advanced directive: Yes    End of Life Decisions reviewed with patient: Yes    Provider agrees with end of life decisions: Yes      Cognitive Screening:   Provider or family/friend/caregiver concerned regarding cognition?: No    PREVENTIVE SCREENINGS      Cardiovascular Screening:    General: History Lipid Disorder and Risks and Benefits Discussed    Due for: Lipid Panel      Diabetes Screening:     General: Screening Current      Colorectal Cancer Screening:     General: Screening Not Indicated      Prostate Cancer Screening:    General: Screening Not Indicated      Osteoporosis Screening:    General: Screening Not Indicated      Abdominal Aortic Aneurysm (AAA) Screening:    Risk factors include: tobacco use        Lung Cancer Screening:     General: Screening Not Indicated      Hepatitis C Screening:    General: Risks and Benefits Discussed    Hep C Screening Accepted: Yes      Screening, Brief Intervention, and Referral to Treatment (SBIRT)    Screening      AUDIT-C Screenin) How often did you have a drink containing alcohol in the past year? never  2) How many drinks did you have on a typical day when you were drinking in the past year? 0  3) How often did you have 6 or more drinks on one occasion in the past year? never    AUDIT-C Score: 0  Interpretation: Score 0-3 (male): Negative screen for alcohol misuse    Single Item Drug Screening:  How often have you used an illegal drug (including marijuana) or a prescription medication for non-medical reasons in the past year? never    Single Item Drug Screen Score: 0  Interpretation: Negative screen for possible drug use disorder    Brief Intervention  Alcohol & drug use screenings were reviewed  No concerns regarding substance use disorder identified  Other Counseling Topics:   Calcium and vitamin D intake and regular weightbearing exercise       No exam data present     Physical Exam:     /70   Pulse 78   Resp 18   Ht 6' 2" (1 88 m)   Wt 85 7 kg (189 lb)   BMI 24 27 kg/m²     Physical Exam     Melodie Figueroa DO

## 2022-08-02 ENCOUNTER — APPOINTMENT (OUTPATIENT)
Dept: LAB | Facility: HOSPITAL | Age: 87
End: 2022-08-02
Attending: INTERNAL MEDICINE
Payer: MEDICARE

## 2022-08-02 DIAGNOSIS — Z11.59 ENCOUNTER FOR HEPATITIS C SCREENING TEST FOR LOW RISK PATIENT: ICD-10-CM

## 2022-08-02 DIAGNOSIS — Z12.5 ENCOUNTER FOR SCREENING FOR MALIGNANT NEOPLASM OF PROSTATE: ICD-10-CM

## 2022-08-02 DIAGNOSIS — D72.829 LEUKOCYTOSIS, UNSPECIFIED TYPE: ICD-10-CM

## 2022-08-02 DIAGNOSIS — R93.3 ABNORMAL FINDINGS ON DIAGNOSTIC IMAGING OF OTHER PARTS OF DIGESTIVE TRACT: ICD-10-CM

## 2022-08-02 DIAGNOSIS — R39.11 URINARY HESITANCY: ICD-10-CM

## 2022-08-02 DIAGNOSIS — I48.91 ATRIAL FIBRILLATION, UNSPECIFIED TYPE (HCC): ICD-10-CM

## 2022-08-02 LAB
ALBUMIN SERPL BCP-MCNC: 3.3 G/DL (ref 3.5–5)
ALP SERPL-CCNC: 58 U/L (ref 46–116)
ALT SERPL W P-5'-P-CCNC: 8 U/L (ref 12–78)
ANION GAP SERPL CALCULATED.3IONS-SCNC: 8 MMOL/L (ref 4–13)
ANISOCYTOSIS BLD QL SMEAR: PRESENT
AST SERPL W P-5'-P-CCNC: 16 U/L (ref 5–45)
BASOPHILS # BLD MANUAL: 0 THOUSAND/UL (ref 0–0.1)
BASOPHILS NFR MAR MANUAL: 0 % (ref 0–1)
BILIRUB SERPL-MCNC: 1.38 MG/DL (ref 0.2–1)
BILIRUB UR QL STRIP: NEGATIVE
BUN SERPL-MCNC: 17 MG/DL (ref 5–25)
CALCIUM ALBUM COR SERPL-MCNC: 9.7 MG/DL (ref 8.3–10.1)
CALCIUM SERPL-MCNC: 9.1 MG/DL (ref 8.3–10.1)
CHLORIDE SERPL-SCNC: 98 MMOL/L (ref 96–108)
CHOLEST SERPL-MCNC: 132 MG/DL
CLARITY UR: CLEAR
CO2 SERPL-SCNC: 27 MMOL/L (ref 21–32)
COLOR UR: YELLOW
CREAT SERPL-MCNC: 1.12 MG/DL (ref 0.6–1.3)
EOSINOPHIL # BLD MANUAL: 0 THOUSAND/UL (ref 0–0.4)
EOSINOPHIL NFR BLD MANUAL: 0 % (ref 0–6)
ERYTHROCYTE [DISTWIDTH] IN BLOOD BY AUTOMATED COUNT: 14.7 % (ref 11.6–15.1)
GFR SERPL CREATININE-BSD FRML MDRD: 55 ML/MIN/1.73SQ M
GLUCOSE P FAST SERPL-MCNC: 95 MG/DL (ref 65–99)
GLUCOSE UR STRIP-MCNC: NEGATIVE MG/DL
HCT VFR BLD AUTO: 36.4 % (ref 36.5–49.3)
HCV AB SER QL: NORMAL
HDLC SERPL-MCNC: 26 MG/DL
HGB BLD-MCNC: 11.7 G/DL (ref 12–17)
HGB UR QL STRIP.AUTO: NEGATIVE
KETONES UR STRIP-MCNC: NEGATIVE MG/DL
LDLC SERPL CALC-MCNC: 91 MG/DL (ref 0–100)
LEUKOCYTE ESTERASE UR QL STRIP: NEGATIVE
LYMPHOCYTES # BLD AUTO: 1.06 THOUSAND/UL (ref 0.6–4.47)
LYMPHOCYTES # BLD AUTO: 6 % (ref 14–44)
MCH RBC QN AUTO: 31.4 PG (ref 26.8–34.3)
MCHC RBC AUTO-ENTMCNC: 32.1 G/DL (ref 31.4–37.4)
MCV RBC AUTO: 98 FL (ref 82–98)
MONOCYTES # BLD AUTO: 0.71 THOUSAND/UL (ref 0–1.22)
MONOCYTES NFR BLD: 4 % (ref 4–12)
NEUTROPHILS # BLD MANUAL: 15.87 THOUSAND/UL (ref 1.85–7.62)
NEUTS BAND NFR BLD MANUAL: 4 % (ref 0–8)
NEUTS SEG NFR BLD AUTO: 86 % (ref 43–75)
NITRITE UR QL STRIP: NEGATIVE
NONHDLC SERPL-MCNC: 106 MG/DL
OVALOCYTES BLD QL SMEAR: PRESENT
PH UR STRIP.AUTO: 6 [PH]
PLATELET # BLD AUTO: 173 THOUSANDS/UL (ref 149–390)
PLATELET BLD QL SMEAR: ADEQUATE
PMV BLD AUTO: 10.6 FL (ref 8.9–12.7)
POTASSIUM SERPL-SCNC: 3.9 MMOL/L (ref 3.5–5.3)
PROT SERPL-MCNC: 7.4 G/DL (ref 6.4–8.4)
PROT UR STRIP-MCNC: NEGATIVE MG/DL
PSA SERPL-MCNC: 2.1 NG/ML (ref 0–4)
RBC # BLD AUTO: 3.73 MILLION/UL (ref 3.88–5.62)
SODIUM SERPL-SCNC: 133 MMOL/L (ref 135–147)
SP GR UR STRIP.AUTO: 1.01 (ref 1–1.03)
TRIGL SERPL-MCNC: 73 MG/DL
UROBILINOGEN UR QL STRIP.AUTO: 1 E.U./DL
WBC # BLD AUTO: 17.63 THOUSAND/UL (ref 4.31–10.16)

## 2022-08-02 PROCEDURE — 85007 BL SMEAR W/DIFF WBC COUNT: CPT

## 2022-08-02 PROCEDURE — G0103 PSA SCREENING: HCPCS

## 2022-08-02 PROCEDURE — 80053 COMPREHEN METABOLIC PANEL: CPT

## 2022-08-02 PROCEDURE — 80061 LIPID PANEL: CPT

## 2022-08-02 PROCEDURE — 81003 URINALYSIS AUTO W/O SCOPE: CPT | Performed by: INTERNAL MEDICINE

## 2022-08-02 PROCEDURE — 85027 COMPLETE CBC AUTOMATED: CPT

## 2022-08-02 PROCEDURE — 36415 COLL VENOUS BLD VENIPUNCTURE: CPT

## 2022-08-02 PROCEDURE — 86803 HEPATITIS C AB TEST: CPT

## 2022-08-04 ENCOUNTER — TELEPHONE (OUTPATIENT)
Dept: FAMILY MEDICINE CLINIC | Facility: CLINIC | Age: 87
End: 2022-08-04

## 2022-08-04 NOTE — TELEPHONE ENCOUNTER
Tried to schedule a CT scan as you wanted him to get one ASAP due to his lab work and Scheduling told her a CT order was not in for him to get one at this time  One is in from [facility] for the future  Please advise

## 2022-08-04 NOTE — TELEPHONE ENCOUNTER
Th order is the one from {Pulmonary I let pulmonary know that I suggested this  He just needs to change the date if able to sooner since labs remain abnormal

## 2022-08-11 ENCOUNTER — HOSPITAL ENCOUNTER (OUTPATIENT)
Dept: CT IMAGING | Facility: HOSPITAL | Age: 87
Discharge: HOME | End: 2022-08-11
Attending: INTERNAL MEDICINE
Payer: MEDICARE

## 2022-08-11 DIAGNOSIS — J18.9 PNEUMONIA OF LEFT LOWER LOBE DUE TO INFECTIOUS ORGANISM: ICD-10-CM

## 2022-08-11 PROCEDURE — G1004 CDSM NDSC: HCPCS

## 2022-08-11 PROCEDURE — 71250 CT THORAX DX C-: CPT

## 2022-08-16 ENCOUNTER — TELEPHONE (OUTPATIENT)
Dept: FAMILY MEDICINE CLINIC | Facility: CLINIC | Age: 87
End: 2022-08-16

## 2022-08-16 NOTE — TELEPHONE ENCOUNTER
Spoke to Kaitlynn Mills regarding pt's CT results yesterday, he has PFT scheduled for next month, but did you want him to see pulmonary for an eval as well?

## 2022-08-16 NOTE — TELEPHONE ENCOUNTER
He is scheduled the day after PFTS with Pulmonary per our chart  I did send results to the ordering pulmonary doctor asking him to review so they may call to move sooner

## 2022-08-25 DIAGNOSIS — B97.21 SARS-ASSOCIATED CORONAVIRUS INFECTION: Primary | ICD-10-CM

## 2022-08-26 PROCEDURE — U0003 INFECTIOUS AGENT DETECTION BY NUCLEIC ACID (DNA OR RNA); SEVERE ACUTE RESPIRATORY SYNDROME CORONAVIRUS 2 (SARS-COV-2) (CORONAVIRUS DISEASE [COVID-19]), AMPLIFIED PROBE TECHNIQUE, MAKING USE OF HIGH THROUGHPUT TECHNOLOGIES AS DESCRIBED BY CMS-2020-01-R: HCPCS | Performed by: INTERNAL MEDICINE

## 2022-08-26 PROCEDURE — U0005 INFEC AGEN DETEC AMPLI PROBE: HCPCS | Performed by: INTERNAL MEDICINE

## 2022-08-27 LAB — SARS-COV-2 RNA RESP QL NAA+PROBE: NEGATIVE

## 2022-08-31 ENCOUNTER — REMOTE DEVICE CLINIC VISIT (OUTPATIENT)
Dept: CARDIOLOGY CLINIC | Facility: CLINIC | Age: 87
End: 2022-08-31
Payer: MEDICARE

## 2022-08-31 DIAGNOSIS — Z95.0 PRESENCE OF PERMANENT CARDIAC PACEMAKER: Primary | ICD-10-CM

## 2022-08-31 PROCEDURE — 93294 REM INTERROG EVL PM/LDLS PM: CPT | Performed by: INTERNAL MEDICINE

## 2022-08-31 PROCEDURE — 93296 REM INTERROG EVL PM/IDS: CPT | Performed by: INTERNAL MEDICINE

## 2022-09-01 ENCOUNTER — OFFICE VISIT (OUTPATIENT)
Dept: PULMONOLOGY | Facility: CLINIC | Age: 87
End: 2022-09-01
Payer: MEDICARE

## 2022-09-01 VITALS
HEART RATE: 60 BPM | TEMPERATURE: 97.9 F | WEIGHT: 177.6 LBS | BODY MASS INDEX: 22.79 KG/M2 | SYSTOLIC BLOOD PRESSURE: 129 MMHG | OXYGEN SATURATION: 94 % | DIASTOLIC BLOOD PRESSURE: 57 MMHG | HEIGHT: 74 IN

## 2022-09-01 DIAGNOSIS — R93.89 ABNORMAL CT OF THE CHEST: Primary | ICD-10-CM

## 2022-09-01 PROCEDURE — 99214 OFFICE O/P EST MOD 30 MIN: CPT | Performed by: INTERNAL MEDICINE

## 2022-09-01 NOTE — PROGRESS NOTES
Results for orders placed or performed in visit on 08/31/22   Cardiac EP device report    Narrative    SJM-DUAL CHAMBER PPM (VVIR 60)/ NOT MRI CONDITIONAL  MERLIN TRANSMISSION: BATTERY VOLTAGE ADEQUATE  (1 8 YRS)   99%  ALL AVAILABLE LEAD PARAMETERS WITHIN NORMAL LIMITS  NO SIGNIFICANT HIGH RATE EPISODES  NORMAL DEVICE FUNCTION  ---BALES

## 2022-09-01 NOTE — PROGRESS NOTES
Pulmonary Follow Up Note   Harinder Woodard 80 y o  male MRN: [id number]  9/1/2022      Assessment:    1  Abnormal CT of the chest  - unclear etiology, at the time of his symptoms being he did have an elevated white blood cell count which is suggestive of a pneumonia especially given the fact that his symptoms have now resolved completely  Other possibility would be  or acute HP however he has no history of exposures and no medications known to cause obvious pulmonary disease  Heart failure can also cause GGO but the distribution, lack of peripheral edema, self resolution and leukocytosis are not supportive  Therefore the most likely explanation is infectious or cryptogenic organizing pneumonia  Since the symptoms have completely resolved, I do not feels though he requires initiation any treatment at this time  I do recommend that we repeat his CT of the chest 6 weeks from the last imaging to ensure resolution of the abnormalities  If the infiltrates demonstrate redistribution, could consider starting very low dose of prednisone otherwise if he is asymptomatic I will hold off on any additional workup      Plan:    Diagnoses and all orders for this visit:    Abnormal CT of the chest  -     CT chest without contrast; Future        Return in about 6 weeks (around 10/13/2022)  History of Present Illness   HPI:  Harinder Woodard is a 80 y o  male who presents for follow up of shortness of breath and abnormal CT chest   The patient was initially hospitalized for decreased appetite, weight loss back in April of 2022  He had a CT of the abdomen that captured a left lower lobe consolidation  He then had a repeat CT of the chest in May 26, 2022 that showed persistence of the consolidation    He then felt ill in early August and had blood work that showed he had an elevated white blood cell count and he had 80 CT of the chest that showed near resolution of the left lower lobe consolidation but he now had scattered areas of ground-glass opacification  He reports that since the beginning of August his symptoms have much improved, he no longer has any shortness of breath or cough  He notices that the more physically active he is the less likely used experience symptoms  He has been walking more frequently  Denies any recent sick contacts  Is essentially asymptomatic today  Review of Systems   Constitutional: Negative for chills and fever  HENT: Negative for congestion, postnasal drip and rhinorrhea  Eyes: Negative for itching  Respiratory: Negative for cough, shortness of breath, wheezing and stridor  Cardiovascular: Negative for chest pain, palpitations and leg swelling  Gastrointestinal: Negative for abdominal distention, abdominal pain, nausea and vomiting  Genitourinary: Negative for dysuria and flank pain  Musculoskeletal: Negative for arthralgias and myalgias  Skin: Negative for color change  Neurological: Negative for dizziness, light-headedness and headaches  Psychiatric/Behavioral: Negative          Historical Information   Past Medical History:   Diagnosis Date    Arthritis     Atrial fibrillation ([facility] )     Last Assessed:8/10/17    Benign prostatic hyperplasia     Last Assessed:1/28/14    BPH (benign prostatic hyperplasia)     Complete atrioventricular block (HCC)     Diverticulosis     Last Assessed:4/30/13    Hypercholesterolemia     Hypertension     Hyponatremia 04/10/2022    Paroxysmal ventricular tachycardia (HCC)     Last Assessed:7/20/17    Pneumonia     Prostatitis     Last Assessed:12/19/12    Pulmonary artery hypertension (HCC)     mild pulmonary htn    Retention, urine     Right bundle branch block     Last Assessed:11/26/13    Sepsis without acute organ dysfunction ([facility] ) 04/10/2022    Suicidal ideation 04/10/2022    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     Last DUFCPOMUR:58/2/90     Past Surgical History:   Procedure Laterality Date    CARDIAC PACEMAKER PLACEMENT [facility] DC PPM    COLONOSCOPY      HEMORRHOID SURGERY      Cauterization of hemorrhoids    HERNIA REPAIR      INCISION AND DRAINAGE ABSCESS / HEMATOMA OF Zachariah Leather / Daniel Drop / [address]      Fatty Tumor Removed    KNEE SURGERY Left     Steel Removes from left knee     Family History   Problem Relation Age of Onset    Hypertension Mother     Stroke Mother         Stroke Syndrome    Other Sister         pacemaker placement    Prostate cancer Brother     Diabetes Family          Meds/Allergies     Current Outpatient Medications:     Ascorbic Acid (VITAMIN C) 1000 MG tablet, Take 1 tablet by mouth daily, Disp: , Rfl:     aspirin 325 mg tablet, Take 1 tablet by mouth daily, Disp: , Rfl:     Cholecalciferol (VITAMIN D3) 1000 units CAPS, Take 1 tablet by mouth daily, Disp: , Rfl:     cyanocobalamin (VITAMIN B-12) 100 mcg tablet, Take 1 tablet by mouth daily, Disp: , Rfl:     finasteride (PROSCAR) 5 mg tablet, TAKE 1 TABLET (5 MG TOTAL) BY MOUTH DAILY  , Disp: 90 tablet, Rfl: 1    metoprolol succinate (TOPROL-XL) 50 mg 24 hr tablet, TAKE 1 5 TABLET DAILY, Disp: 135 tablet, Rfl: 3    Multiple Vitamins-Minerals (ICAPS AREDS 2 PO), Take by mouth, Disp: , Rfl:     tamsulosin (FLOMAX) 0 4 mg, TAKE 1 CAPSULE BY MOUTH EVERY DAY, Disp: 90 capsule, Rfl: 5    clotrimazole (MYCELEX) 10 mg anjelica, Take 1 tablet (10 mg total) by mouth in the morning and 1 tablet (10 mg total) in the evening and 1 tablet (10 mg total) before bedtime  (Patient not taking: Reported on 9/1/2022), Disp: 30 Anjelica, Rfl: 0    Red Yeast Rice 600 MG TABS, Take by mouth daily (Patient not taking: No sig reported), Disp: , Rfl:   Allergies   Allergen Reactions    Ibuprofen Hives       Vitals: Blood pressure 129/57, pulse 60, temperature 97 9 °F (36 6 °C), temperature source Tympanic, height 6' 2" (1 88 m), weight 80 6 kg (177 lb 9 6 oz), SpO2 94 %  Body mass index is 22 8 kg/m²   Oxygen Therapy  SpO2: 94 %  Oxygen Therapy: None (Room air)      Physical Exam  Physical Exam  Constitutional:       General: He is not in acute distress  Appearance: He is not diaphoretic  HENT:      Head: Normocephalic and atraumatic  Nose: Nose normal       Mouth/Throat:      Pharynx: No oropharyngeal exudate  Eyes:      General: No scleral icterus  Conjunctiva/sclera: Conjunctivae normal       Pupils: Pupils are equal, round, and reactive to light  Neck:      Thyroid: No thyromegaly  Vascular: No JVD  Trachea: No tracheal deviation  Cardiovascular:      Rate and Rhythm: Normal rate and regular rhythm  Heart sounds: Normal heart sounds  No murmur heard  No friction rub  No gallop  Comments: S3 gallop noted  Pulmonary:      Effort: Pulmonary effort is normal  No respiratory distress  Breath sounds: Normal breath sounds  No stridor  No wheezing or rales  Abdominal:      General: Bowel sounds are normal  There is no distension  Palpations: Abdomen is soft  Tenderness: There is no abdominal tenderness  There is no guarding or rebound  Musculoskeletal:         General: No deformity  Normal range of motion  Cervical back: Normal range of motion and neck supple  Lymphadenopathy:      Cervical: No cervical adenopathy  Skin:     General: Skin is warm  Findings: No erythema or rash  Neurological:      Mental Status: He is alert and oriented to person, place, and time  Cranial Nerves: No cranial nerve deficit  Sensory: No sensory deficit  Labs: I have personally reviewed pertinent lab results    Lab Results   Component Value Date    WBC 17 63 (H) 08/02/2022    HGB 11 7 (L) 08/02/2022    HCT 36 4 (L) 08/02/2022    MCV 98 08/02/2022     08/02/2022     Lab Results   Component Value Date    CALCIUM 9 1 08/02/2022    K 3 9 08/02/2022    CO2 27 08/02/2022    CL 98 08/02/2022    BUN 17 08/02/2022    CREATININE 1 12 08/02/2022     No results found for: IGE  Lab Results   Component Value Date    ALT 8 (L) 08/02/2022    AST 16 08/02/2022    ALKPHOS 58 08/02/2022       Imaging and other studies: I have personally reviewed pertinent reports  and I have personally reviewed pertinent films in PACS  Reviewed the CT abdomen and pelvis with contrast from 04/10/2022 to as well as the CT chest without contrast from 05/26/2022 and 08/11/2022  The initial imaging demonstrated a left lower lobe consolidation  This is nearly resolved on the new was CT of the chest in August however there are now scattered ground-glass opacifications    Pulmonary function testing:  None on file    EKG, Pathology, and Other Studies: I have personally reviewed pertinent reports     and I have personally reviewed pertinent films in PACS    Dong Ruiz MD  Pulmonary and Critical Care   [facility]

## 2022-09-13 ENCOUNTER — HOSPITAL ENCOUNTER (OUTPATIENT)
Dept: PULMONOLOGY | Facility: HOSPITAL | Age: 87
Discharge: HOME | End: 2022-09-13
Attending: INTERNAL MEDICINE
Payer: MEDICARE

## 2022-09-13 DIAGNOSIS — J18.9 PNEUMONIA OF LEFT LOWER LOBE DUE TO INFECTIOUS ORGANISM: ICD-10-CM

## 2022-09-13 PROCEDURE — 94060 EVALUATION OF WHEEZING: CPT | Performed by: INTERNAL MEDICINE

## 2022-09-13 PROCEDURE — 94727 GAS DIL/WSHOT DETER LNG VOL: CPT | Performed by: INTERNAL MEDICINE

## 2022-09-13 PROCEDURE — 94727 GAS DIL/WSHOT DETER LNG VOL: CPT

## 2022-09-13 PROCEDURE — 94729 DIFFUSING CAPACITY: CPT | Performed by: INTERNAL MEDICINE

## 2022-09-13 PROCEDURE — 94060 EVALUATION OF WHEEZING: CPT

## 2022-09-13 PROCEDURE — 94729 DIFFUSING CAPACITY: CPT

## 2022-09-13 PROCEDURE — 94760 N-INVAS EAR/PLS OXIMETRY 1: CPT

## 2022-09-13 RX ORDER — ALBUTEROL SULFATE 2.5 MG/3ML
2.5 SOLUTION RESPIRATORY (INHALATION) ONCE AS NEEDED
Status: COMPLETED | OUTPATIENT
Start: 2022-09-13 | End: 2022-09-13

## 2022-09-13 RX ADMIN — ALBUTEROL SULFATE 2.5 MG: 2.5 SOLUTION RESPIRATORY (INHALATION) at 09:30

## 2022-10-11 PROBLEM — J18.9 PNEUMONIA OF LEFT LOWER LOBE DUE TO INFECTIOUS ORGANISM: Status: RESOLVED | Noted: 2022-04-10 | Resolved: 2022-10-11

## 2022-10-23 ENCOUNTER — TELEPHONE (OUTPATIENT)
Dept: OTHER | Facility: OTHER | Age: 87
End: 2022-10-23

## 2022-10-23 NOTE — TELEPHONE ENCOUNTER
Patient is calling regarding cancelling an appointment      Date/Time: 10/26 @:8:40am Pulm COAL    Patient was rescheduled: YES [] NO [x]    Patient requesting call back to reschedule: YES [x] NO []

## 2022-10-28 DIAGNOSIS — N40.0 BENIGN PROSTATIC HYPERPLASIA, UNSPECIFIED WHETHER LOWER URINARY TRACT SYMPTOMS PRESENT: ICD-10-CM

## 2022-10-28 RX ORDER — TAMSULOSIN HYDROCHLORIDE 0.4 MG/1
CAPSULE ORAL
Qty: 90 CAPSULE | Refills: 5 | Status: SHIPPED | OUTPATIENT
Start: 2022-10-28

## 2022-11-30 ENCOUNTER — REMOTE DEVICE CLINIC VISIT (OUTPATIENT)
Dept: CARDIOLOGY CLINIC | Facility: CLINIC | Age: 87
End: 2022-11-30

## 2022-11-30 DIAGNOSIS — Z95.0 PRESENCE OF CARDIAC PACEMAKER: Primary | ICD-10-CM

## 2022-11-30 NOTE — PROGRESS NOTES
Results for orders placed or performed in visit on 11/30/22   Cardiac EP device report    Narrative    SJM-DUAL CHAMBER PPM (VVIR 60)/ NOT MRI CONDITIONAL  MERLIN TRANSMISSION: BATTERY VOLTAGE ADEQUATE (1 6 YRS)   99% (>40%/CHB/VVIR 60); ALL AVAILABLE LEAD PARAMETERS WITHIN NORMAL LIMITS  NO NEW SIGNIFICANT HIGH RATE EPISODES  VHR PREVIOUSLY REPORTED  PATIENT ON , METOPROLOL SUCC  PATIENT REFUSES AC  NORMAL DEVICE FUNCTION    ES Subjective   Chief Complaint   Patient presents with   • Sinus pressure   • Headache     pressure, 1 week     Jovany Taveras is a 54 y.o. male.     Patient Care Team:  Debra Frazier MD as PCP - General    He is coming in today as he has not been feeling well for the last week.  He started with some pressure in his sinuses and even burning feeling in the nasal passages with radiation to the forehead.  It seems to be worse on the left side.  He does not necessarily feel congested and he denies any nasal discharge.  He denies any fever or difficulty breathing.  However he definitely feels to be somehow rundown.  He was still able to work through the week and also do his regular after work activities.  He got tested for COVID-19 yesterday, it was PCR test and the result is still pending.  He is not aware of being exposed to anybody who is sick with COVID-19.  He is fully vaccinated against COVID-19.  He also would like to get refill on the steroid cream he uses as needed on the facial eczema/dermatitis.       The following portions of the patient's history were reviewed and updated as appropriate: allergies, current medications, past family history, past medical history, past social history, past surgical history and problem list.  Past Medical History:   Diagnosis Date   • ADD (attention deficit disorder)    • Allergic rhinitis    • Bulging lumbar disc    • Chlamydia infection    • Genital warts    • Osteoarthritis    • Rotator cuff tear    • Vitamin D deficiency      Past Surgical History:   Procedure Laterality Date   • COLONOSCOPY  09/10/2018    polyps removed; 5 years repeat   • SHOULDER ARTHROSCOPY W/ ROTATOR CUFF REPAIR Left 2/19/2021    Procedure: SHOULDER ARTHROSCOPY, BANKART REPAIR AND ROTATOR CUFF REPAIR;  Surgeon: Wan Mccormick MD;  Location: [facility] MAIN OR;  Service: Orthopedics;  Laterality: Left;   • SHOULDER SURGERY Left 02/19/2021    scope/ bankart repair   • TONSILLECTOMY       The  "patient has a family history of  Family History   Problem Relation Age of Onset   • Hypertension Father    • Osteoarthritis Father    • Osteoporosis Father      Social History     Socioeconomic History   • Marital status: Single   Tobacco Use   • Smoking status: Never Smoker   • Smokeless tobacco: Never Used   Vaping Use   • Vaping Use: Never used   Substance and Sexual Activity   • Alcohol use: Yes     Alcohol/week: 12.0 standard drinks     Types: 12 Cans of beer per week   • Drug use: No   • Sexual activity: Defer       Review of Systems   Constitutional: Negative for activity change, appetite change, chills and fever.   HENT: Positive for congestion and sinus pressure. Negative for ear pain, postnasal drip, sore throat and swollen glands.    Respiratory: Negative for cough, choking, chest tightness, shortness of breath, wheezing and stridor.    Cardiovascular: Negative for chest pain.   Skin: Negative for dry skin and rash.   Neurological: Positive for headache.     Visit Vitals  /89 (BP Location: Left arm, Patient Position: Sitting, Cuff Size: Adult)   Pulse 57   Temp 97.7 °F (36.5 °C)   Resp 16   Ht 175.3 cm (69\")   Wt 85.7 kg (189 lb)   SpO2 96%   BMI 27.91 kg/m²       Current Outpatient Medications:   •  amphetamine-dextroamphetamine (Adderall) 20 MG tablet, Take 1 tablet by mouth 2 (Two) Times a Day., Disp: 60 tablet, Rfl: 0  •  azithromycin (ZITHROMAX) 250 MG tablet, Take 1 tablet by mouth Daily for 5 days. Take 2 tablets the first day, then 1 tablet daily for 4 days., Disp: 6 tablet, Rfl: 0  •  betamethasone, augmented, (DIPROLENE) 0.05 % gel, Apply 1 application topically to the appropriate area as directed 2 (Two) Times a Day., Disp: 15 g, Rfl: 0  •  fluticasone (FLONASE) 50 MCG/ACT nasal spray, FLUTICASONE PROPIONATE 50 MCG/ACT SUSP, Disp: , Rfl:   •  meloxicam (MOBIC) 15 MG tablet, Take 1 tablet by mouth Daily., Disp: 90 tablet, Rfl: 1  •  methylPREDNISolone (Medrol) 4 MG dose pack, Take as " directed on package instructions., Disp: 1 each, Rfl: 0    Objective   Physical Exam  Vitals and nursing note reviewed.   Constitutional:       General: He is not in acute distress.     Appearance: He is well-developed.   HENT:      Head: Normocephalic and atraumatic.      Right Ear: External ear normal.      Left Ear: External ear normal.      Mouth/Throat:      Pharynx: No oropharyngeal exudate.   Eyes:      Conjunctiva/sclera: Conjunctivae normal.      Pupils: Pupils are equal, round, and reactive to light.   Cardiovascular:      Rate and Rhythm: Normal rate and regular rhythm.      Heart sounds: Normal heart sounds.   Pulmonary:      Effort: Pulmonary effort is normal. No respiratory distress.      Breath sounds: Normal breath sounds. No wheezing or rales.   Musculoskeletal:      Cervical back: Normal range of motion and neck supple.   Skin:     General: Skin is warm and dry.      Findings: No rash.         Assessment/Plan   Diagnoses and all orders for this visit:    1. Acute recurrent maxillary sinusitis (Primary)  -     methylPREDNISolone (Medrol) 4 MG dose pack; Take as directed on package instructions.  Dispense: 1 each; Refill: 0  -     azithromycin (ZITHROMAX) 250 MG tablet; Take 1 tablet by mouth Daily for 5 days. Take 2 tablets the first day, then 1 tablet daily for 4 days.  Dispense: 6 tablet; Refill: 0    2. Dermatitis  -     betamethasone, augmented, (DIPROLENE) 0.05 % gel; Apply 1 application topically to the appropriate area as directed 2 (Two) Times a Day.  Dispense: 15 g; Refill: 0      I will be starting him on steroid pack.  I also gave him prescription for Z-Jewel, but I advised him to not stop the medication just yet and rather wait how he responds to the Z-Jewel.  He already uses Flonase and he is to continue that.  He will let us know about the results of his COVID-19 test as this should be available later today per patient's report.  I also refilled his topical steroid cream which he uses only  as needed and pretty sporadically.        Return if symptoms worsen or fail to improve, for Recheck.    Requested Prescriptions     Signed Prescriptions Disp Refills   • betamethasone, augmented, (DIPROLENE) 0.05 % gel 15 g 0     Sig: Apply 1 application topically to the appropriate area as directed 2 (Two) Times a Day.   • methylPREDNISolone (Medrol) 4 MG dose pack 1 each 0     Sig: Take as directed on package instructions.   • azithromycin (ZITHROMAX) 250 MG tablet 6 tablet 0     Sig: Take 1 tablet by mouth Daily for 5 days. Take 2 tablets the first day, then 1 tablet daily for 4 days.

## 2023-01-01 ENCOUNTER — TELEPHONE (OUTPATIENT)
Dept: HOME HOSPICE | Facility: HOSPICE | Age: 88
End: 2023-01-01

## 2023-01-25 ENCOUNTER — APPOINTMENT (EMERGENCY)
Dept: RADIOLOGY | Facility: HOSPITAL | Age: 88
End: 2023-01-25

## 2023-01-25 ENCOUNTER — APPOINTMENT (EMERGENCY)
Dept: CT IMAGING | Facility: HOSPITAL | Age: 88
End: 2023-01-25

## 2023-01-25 ENCOUNTER — HOSPITAL ENCOUNTER (EMERGENCY)
Facility: HOSPITAL | Age: 88
Discharge: HOME | End: 2023-01-25
Attending: EMERGENCY MEDICINE

## 2023-01-25 VITALS
BODY MASS INDEX: 23.97 KG/M2 | TEMPERATURE: 97.2 F | OXYGEN SATURATION: 98 % | SYSTOLIC BLOOD PRESSURE: 153 MMHG | RESPIRATION RATE: 20 BRPM | DIASTOLIC BLOOD PRESSURE: 56 MMHG | HEART RATE: 60 BPM | WEIGHT: 186.73 LBS

## 2023-01-25 DIAGNOSIS — S09.90XA INJURY OF HEAD, INITIAL ENCOUNTER: Primary | ICD-10-CM

## 2023-01-25 DIAGNOSIS — S62.522A CLOSED DISPLACED FRACTURE OF DISTAL PHALANX OF LEFT THUMB, INITIAL ENCOUNTER: ICD-10-CM

## 2023-01-25 DIAGNOSIS — S01.01XA SCALP LACERATION, INITIAL ENCOUNTER: ICD-10-CM
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
## 2023-01-25 DIAGNOSIS — S80.11XA CONTUSION OF RIGHT LOWER LEG, INITIAL ENCOUNTER: ICD-10-CM

## 2023-01-25 RX ORDER — LIDOCAINE HYDROCHLORIDE AND EPINEPHRINE 10; 10 MG/ML; UG/ML
10 INJECTION, SOLUTION INFILTRATION; PERINEURAL ONCE
Status: COMPLETED | OUTPATIENT
Start: 2023-01-25 | End: 2023-01-25

## 2023-01-25 RX ADMIN — LIDOCAINE HYDROCHLORIDE,EPINEPHRINE BITARTRATE 10 ML: 10; .01 INJECTION, SOLUTION INFILTRATION; PERINEURAL at 18:29

## 2023-01-25 NOTE — ED PROVIDER NOTES
History  Chief Complaint   Patient presents with   • Fall     Pt fell hitting head on concrete  No loc no blood thinners  Laceration on scalp  And complaining of right 5th finger pain     Mechaniscal slip and fall, struck right temporal-occiput area with scalp lac, no loc/ams/vomiting/focal neuro  Also struck and has injuries to left thumb (ecchymosis distal phalanx), right pinky (two skin avulsions radial aspect), right tib-fib (contusion over lateral aspect, no numbness/motor loss/progressive pain)  Not on aspirin or thinners, and platelets recently wnl  Prior to Admission Medications   Prescriptions Last Dose Informant Patient Reported? Taking? Ascorbic Acid (VITAMIN C) 1000 MG tablet   Yes No   Sig: Take 1 tablet by mouth daily   Cholecalciferol (VITAMIN D3) 1000 units CAPS   Yes No   Sig: Take 1 tablet by mouth daily   Multiple Vitamins-Minerals (ICAPS AREDS 2 PO)   Yes No   Sig: Take by mouth   Red Yeast Rice 600 MG TABS   Yes No   Sig: Take by mouth daily   Patient not taking: No sig reported   aspirin 325 mg tablet Not Taking  Yes No   Sig: Take 1 tablet by mouth daily   Patient not taking: Reported on 1/25/2023   clotrimazole (MYCELEX) 10 mg isabell   No No   Sig: Take 1 tablet (10 mg total) by mouth in the morning and 1 tablet (10 mg total) in the evening and 1 tablet (10 mg total) before bedtime  Patient not taking: No sig reported   cyanocobalamin (VITAMIN B-12) 100 mcg tablet   Yes No   Sig: Take 1 tablet by mouth daily   finasteride (PROSCAR) 5 mg tablet   No No   Sig: TAKE 1 TABLET (5 MG TOTAL) BY MOUTH DAILY     metoprolol succinate (TOPROL-XL) 50 mg 24 hr tablet   No No   Sig: TAKE 1 5 TABLET DAILY   tamsulosin (FLOMAX) 0 4 mg   No No   Sig: TAKE 1 CAPSULE BY MOUTH EVERY DAY      Facility-Administered Medications: None       Past Medical History:   Diagnosis Date   • Arthritis    • Atrial fibrillation [facility])     Last Assessed:8/10/17   • Benign prostatic hyperplasia     Last Assessed:14   • BPH (benign prostatic hyperplasia)    • Complete atrioventricular block (HCC)    • Diverticulosis     Last Assessed:13   • Hypercholesterolemia    • Hypertension    • Hyponatremia 04/10/2022   • Paroxysmal ventricular tachycardia     Last Assessed:17   • Pneumonia    • Prostatitis     Last Assessed:12   • Pulmonary artery hypertension (HCC)     mild pulmonary htn   • Retention, urine    • Right bundle branch block     Last Assessed:13   • Sepsis without acute organ dysfunction ([facility] [address] ) 04/10/2022   • Suicidal ideation 04/10/2022   • 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     Last OFYBBACVQ:       Past Surgical History:   Procedure Laterality Date   • CARDIAC PACEMAKER PLACEMENT      [facility] DC PPM   • COLONOSCOPY     • HEMORRHOID SURGERY      Cauterization of hemorrhoids   • HERNIA REPAIR     • INCISION AND DRAINAGE ABSCESS / HEMATOMA OF BURSA / KNEE / THIGH      Fatty Tumor Removed   • KNEE SURGERY Left     Steel Removes from left knee       Family History   Problem Relation Age of Onset   • Hypertension Mother    • Stroke Mother         Stroke Syndrome   • Other Sister         pacemaker placement   • Prostate cancer Brother    • Diabetes Family      I have reviewed and agree with the history as documented  E-Cigarette/Vaping   • E-Cigarette Use Never User      E-Cigarette/Vaping Substances   • Nicotine No    • THC No    • CBD No    • Flavoring No    • Other No    • Unknown No      Social History     Tobacco Use   • Smoking status: Former     Types: Cigarettes     Quit date:      Years since quittin    • Smokeless tobacco: Never   Vaping Use   • Vaping Use: Never used   Substance Use Topics   • Alcohol use: Not Currently     Alcohol/week: 0 0 standard drinks     Comment: social drinker   • Drug use: No       Review of Systems   All other systems reviewed and are negative        Physical Exam  Physical Exam  Constitutional:       General: He is not in acute distress  Appearance: He is not diaphoretic  HENT:      Head: Normocephalic  Comments: 7cm laceration to right temporal/occipital area  No evidence of fx to face/base of skull/vault  c-spine nt nd  Nose: Nose normal       Mouth/Throat:      Pharynx: Oropharynx is clear  Eyes:      Conjunctiva/sclera: Conjunctivae normal    Neck:      Trachea: No tracheal deviation  Comments: Cervical spine nontender no deformity no instability  Cardiovascular:      Rate and Rhythm: Normal rate and regular rhythm  Heart sounds: Normal heart sounds  No murmur heard  Pulmonary:      Effort: Pulmonary effort is normal  No respiratory distress  Breath sounds: Normal breath sounds  No stridor  Chest:      Chest wall: No tenderness  Abdominal:      General: Bowel sounds are normal  There is no distension  Palpations: Abdomen is soft  Tenderness: There is no abdominal tenderness  There is no right CVA tenderness, left CVA tenderness, guarding or rebound  Genitourinary:     Comments: No Cost-Vertebral Angle Tenderness  Musculoskeletal:      Cervical back: Normal range of motion and neck supple  Comments: With exceptions above, thoracic, lumbar and sacral spine nontender, no deformity, no instability, no overlying skin changes  Pelvis stable, nontender  Ecchymosis to distal phalanx left thumb, no instability/deformity  2x skin avulsions radial aspect right pinky, no instability/deformity  Ecchymosis lateral aspect right tib-fib, no deformity/instability, neurovasc intact, no pain on passive stretch  With exceptions above, all limbs nontender, no deformities, no instability, joints stable FROM, distal LT sensation intact, distal tendon and intrinsic motor function intact, distal pulses and perfusion intact  Skin:     General: Skin is warm and dry  Capillary Refill: Capillary refill takes less than 2 seconds  Neurological:      General: No focal deficit present  Mental Status: He is alert and oriented to person, place, and time  Sensory: No sensory deficit  Motor: No abnormal muscle tone  Psychiatric:         Thought Content: Thought content normal          Vital Signs  ED Triage Vitals [01/25/23 1817]   Temperature Pulse Respirations Blood Pressure SpO2   (!) 97 2 °F (36 2 °C) 64 20 (!) 193/77 97 %      Temp Source Heart Rate Source Patient Position - Orthostatic VS BP Location FiO2 (%)   Temporal Monitor Sitting Right arm --      Pain Score       5           Vitals:    01/25/23 1817 01/25/23 1824 01/25/23 1830   BP: (!) 193/77 (!) 193/77 153/56   Pulse: 64 60 60   Patient Position - Orthostatic VS: Sitting  Sitting         Visual Acuity  Visual Acuity    Flowsheet Row Most Recent Value   L Pupil Size (mm) 2   R Pupil Size (mm) 2          ED Medications  Medications   lidocaine-epinephrine (XYLOCAINE/EPINEPHRINE) 1 %-1:100,000 injection 10 mL (10 mL Infiltration Given 1/25/23 1829)       Diagnostic Studies  Results Reviewed     None                 CT head without contrast   Final Result by Gayle Kirkland MD (01/25 1930)         1  No acute intracranial hemorrhage or mass effect  2   Right parietal scalp hematoma and laceration  No acute calvarial fracture  Workstation performed: [id number]         CT cervical spine without contrast   Final Result by Gayle Kirkland MD (01/25 1959)      No acute cervical spine fracture or traumatic malalignment                     Workstation performed: [id number]         XR tibia fibula 2 views RIGHT   ED Interpretation by Kyle Farrell MD (01/25 1941)   Xray: my "wet read": no fracture or dislocation seen            XR thumb first digit-min 2 views LEFT   ED Interpretation by Kyle Farrell MD (01/25 1941)   Distal phalanx fx right thumb      XR finger fifth digit-pinkie RIGHT   ED Interpretation by Kyle Farrell MD (01/25 1941)   Xray: my "wet read": no fracture or dislocation seen Procedures  Procedures         ED Course                                             Medical Decision Making  Mechanical slip and fall with head strike and scalp laceration  Skin avulsions to right pinky, contusion with ecchymosis to left thumb, contusion to right tib-fib, fx to right thumb distal phalanx,otherwise  no bony injuries  Awaiting CT head and C-spine results  Anticipate discharge home with PMD follow-up  Procedure: Laceration repair  1% lidocaine with epinephrine 8 cc infiltrated to laceration site  8 staples applied with good approximation well-tolerated by patient    Closed displaced fracture of distal phalanx of left thumb, initial encounter: acute illness or injury  Contusion of right lower leg, initial encounter: acute illness or injury  Fall from standing, initial encounter: acute illness or injury  Injury of head, initial encounter: acute illness or injury  Scalp laceration, initial encounter: acute illness or injury  Amount and/or Complexity of Data Reviewed  Radiology: ordered and independent interpretation performed  Risk  Prescription drug management  Disposition  Final diagnoses:   Injury of head, initial encounter   Fall from standing, initial encounter   Scalp laceration, initial encounter   Closed displaced fracture of distal phalanx of left thumb, initial encounter   Contusion of right lower leg, initial encounter     Time reflects when diagnosis was documented in both MDM as applicable and the Disposition within this note     Time User Action Codes Description Comment    1/25/2023  8:13 PM Quilla Lieu Add [L14 67LO] Injury of head, initial encounter     1/25/2023  8:13 PM Quilla Lieu Add [W19  XXXA] Fall from standing, initial encounter     1/25/2023  8:14 PM Quilla Lieu Add [S01 01XA] Scalp laceration, initial encounter     1/25/2023  8:14 PM [address] Closed displaced fracture of distal phalanx of left thumb, initial encounter 1/25/2023  8:14 PM Ruben Lott Add [S80 11XA] Contusion of right lower leg, initial encounter       ED Disposition     ED Disposition   Discharge    Condition   Stable    Date/Time   Wed Jan 25, 2023  8:13 PM    Comment   Willa Chandler discharge to home/self care  Follow-up Information     Follow up With Specialties Details Why Contact Info Additional Information    [facility] shields Orthopedic Surgery Schedule an appointment as soon as possible for a visit in 2 days  [address] [address]  [address] [facility] Damaris shields, [address] [facility], [place], [address] [facility], [facility] [address]          Discharge Medication List as of 1/25/2023  8:15 PM      CONTINUE these medications which have NOT CHANGED    Details   Ascorbic Acid (VITAMIN C) 1000 MG tablet Take 1 tablet by mouth daily, Historical Med      aspirin 325 mg tablet Take 1 tablet by mouth daily, Historical Med      Cholecalciferol (VITAMIN D3) 1000 units CAPS Take 1 tablet by mouth daily, Historical Med      clotrimazole (MYCELEX) 10 mg isabell Take 1 tablet (10 mg total) by mouth in the morning and 1 tablet (10 mg total) in the evening and 1 tablet (10 mg total) before bedtime  , Starting Wed 5/11/2022, Normal      cyanocobalamin (VITAMIN B-12) 100 mcg tablet Take 1 tablet by mouth daily, Historical Med      finasteride (PROSCAR) 5 mg tablet TAKE 1 TABLET (5 MG TOTAL) BY MOUTH DAILY  , Starting Thu 7/28/2022, Normal      metoprolol succinate (TOPROL-XL) 50 mg 24 hr tablet TAKE 1 5 TABLET DAILY, Normal      Multiple Vitamins-Minerals (ICAPS AREDS 2 PO) Take by mouth, Historical Med      Red Yeast Rice 600 MG TABS Take by mouth daily, Historical Med      tamsulosin (FLOMAX) 0 4 mg TAKE 1 CAPSULE BY MOUTH EVERY DAY, Normal             No discharge procedures on file      PDMP Review     None          ED Provider  Electronically Signed by           Richelle Avery MD  01/25/23 0824

## 2023-01-26 NOTE — DISCHARGE INSTRUCTIONS
1  The examination and treatment that you have received has been on an emergency basis and is not intended as an effort to provide complete medical care  It is impossible to recognize and treat all elements of an illness or injury in a single ER visit  2  Jorge Luis Garcia for allowing us to provide emergent medical care to you or your family member  We consider it a privilege to have served you during your illness or injury  3  If you have received a PRESCRIPTION please fill it TODAY and follow the instructions carefully  4  Call your PRIMARY doctor´s office today or the next business day, and tell them you were seen at the ED and that we recommended you be prioritized for an early follow-up appointment in the next 48 hours  Please follow up with any specialists we may have discussed and provided you information on  Also, there may be some RESULTS we have found which are non-emergent but need to be followed up  When you see your primary doctor, have them call and obtain a full copy of the results from our medical records department  Please obtain a copy of the results immediately to follow up with your Primary MD  This is important for continuity of care and if not done, may lead to further issues in your medical care  5  If you do NOT have a primary care doctor, it is important that you [address] [place] [address]  Call your insurance company to get a list of eligible providers  6  RETURN to this or another ER immediately for new, worsening, or concerning symptoms  We are open 24 hours a day and you may return any time  We are happy to see you again to make sure everything is okay  7  PAIN/FEVER RELIEF FOR ADULTS: For MILD PAIN RELIEF, adults can take acetaminophen (Tylenol) 1000 mg every 6 hours (DO NOT TAKE ACETAMINOPHEN IF YOU ARE ALSO TAKING OTHER MEDICATIONS WHICH CONTAIN ACETAMINOPHEN)  For MODERATE PAIN RELIEF ALSO take ibuprofen (Advil, Motrin) 600 mg every 6 hours   Do this for up to five days  You can take the acetaminophen and ibuprofen simultaneously, they have added pain relieving effects when taken at the same time  Do not take acetaminophen or ibuprofen if you have a known allergy or adverse reaction to these medications or if your doctor has advised you not to take them  Do not take Ibuprofen if you are pregnant, or have a history of kidney disease or gastritis or gastrointestinal bleeding  8  PAIN/FEVER RELIEF FOR CHILDREN: For mild pain or fever, give acetaminophen (Tylenol) according to the package instructions for your child's age/weight  For moderate pain/fever in children OVER 6 months also give ibuprofen, according to the package instructions for your child's age/weight  Do this for up to five days  You can give the acetaminophen and ibuprofen simultaneously, they have added pain relieving effects when taken at the same time  Do not give acetaminophen or ibuprofen if your child has a known allergy or adverse reaction to these medications or if your doctor has advised you not to give them  When using these online guides, take care to [address] of the product you are using and match it to the table  9  Be aware that REPEAT READINGS are often done on X-rays, CT scans, ultrasounds and other medical images and that in a small percentage of cases abnormalities are detected on these second readings and you may be contacted if this occurs  10  If you have had a SPLINT applied: be aware that there is always a small chance of a fracture not showing or being missed on imaging  For this reason, do not use or bear weight on the affected limb for the next 5 days  If pain does not resolve completely, or if pain becomes worse or other symptoms develop, see your doctor or return to the ED   You may need repeat imaging or other care

## 2023-01-30 ENCOUNTER — OFFICE VISIT (OUTPATIENT)
Dept: FAMILY MEDICINE CLINIC | Facility: CLINIC | Age: 88
End: 2023-01-30

## 2023-01-30 VITALS — WEIGHT: 186.6 LBS | BODY MASS INDEX: 23.95 KG/M2 | HEIGHT: 74 IN | TEMPERATURE: 97.3 F

## 2023-01-30 DIAGNOSIS — I48.21 PERMANENT ATRIAL FIBRILLATION (HCC): ICD-10-CM

## 2023-01-30 DIAGNOSIS — N18.31 STAGE 3A CHRONIC KIDNEY DISEASE (HCC): ICD-10-CM

## 2023-01-30 DIAGNOSIS — L85.3 DRY SKIN DERMATITIS: ICD-10-CM

## 2023-01-30 DIAGNOSIS — S80.11XA HEMATOMA OF RIGHT LOWER LEG: ICD-10-CM

## 2023-01-30 DIAGNOSIS — S69.92XS THUMB INJURY, LEFT, SEQUELA: ICD-10-CM

## 2023-01-30 DIAGNOSIS — I27.21 PULMONARY ARTERY HYPERTENSION (HCC): ICD-10-CM

## 2023-01-30 DIAGNOSIS — S01.01XS LACERATION OF SCALP, SEQUELA: Primary | ICD-10-CM

## 2023-01-30 PROBLEM — S01.01XA SCALP LACERATION: Status: ACTIVE | Noted: 2023-01-30

## 2023-01-30 RX ORDER — AMMONIUM LACTATE 12 G/100G
LOTION TOPICAL 2 TIMES DAILY PRN
Qty: 225 G | Refills: 3 | Status: SHIPPED | OUTPATIENT
Start: 2023-01-30 | End: 2023-02-03

## 2023-01-30 NOTE — PROGRESS NOTES
Name: Maximo Crowder      : 1928      MRN: [id number]  Encounter Provider: Hiram Chakraborty DO  Encounter Date: 2023   Encounter department: [address]     1  Laceration of scalp, sequela  Schedule staple removal end of this week since too soon to take out today Laceration appears to be healing    2  Thumb injury, left, sequela  It is less swollen and he deferred ortho followup     3  Hematoma of right lower leg  Improving slowly Continue ice and elevation  Stay indoors in winter weather     4  Stage 3a chronic kidney disease ([address] )  Stay hydrated     5  Permanent atrial fibrillation (HCC)  Stable Pt followed by cardio Recent fall was slip on ice not symptomatic fall         Depression Screening and Follow-up Plan: Patient was screened for depression during today's encounter  They screened negative with a PHQ-2 score of 0  Rto 4 months  Subjective      HPI   Pt fell while going to bird feeder with his ice trakers on He tripped and fell He has laceration with staples of scalp and hematoma right leg Left thumb contusion as well He is slowly improving No headaches No chest pain or sob No dizziness No recent illnesses other than fall and was seen  for that   Review of Systems   Constitutional: Negative for chills and fever  HENT: Negative  Eyes: Negative for visual disturbance  Respiratory: Negative for cough and shortness of breath  Cardiovascular: Negative for chest pain, palpitations and leg swelling  Gastrointestinal: Negative for abdominal distention and abdominal pain  Neurological: Negative for dizziness, weakness, light-headedness and headaches  Hematological: Negative for adenopathy  Does not bruise/bleed easily  Psychiatric/Behavioral: Negative for sleep disturbance  The patient is not nervous/anxious      staples in place right scalp laceration which is dry and healing   Dry scaly rash left upper arm No open areas     Current Outpatient Medications on File Prior to Visit   Medication Sig   • Ascorbic Acid (VITAMIN C) 1000 MG tablet Take 1 tablet by mouth daily   • Cholecalciferol (VITAMIN D3) 1000 units CAPS Take 1 tablet by mouth daily   • cyanocobalamin (VITAMIN B-12) 100 mcg tablet Take 1 tablet by mouth daily   • finasteride (PROSCAR) 5 mg tablet TAKE 1 TABLET (5 MG TOTAL) BY MOUTH DAILY  • metoprolol succinate (TOPROL-XL) 50 mg 24 hr tablet TAKE 1 5 TABLET DAILY   • tamsulosin (FLOMAX) 0 4 mg TAKE 1 CAPSULE BY MOUTH EVERY DAY   • aspirin 325 mg tablet Take 1 tablet by mouth daily (Patient not taking: Reported on 1/25/2023)   • clotrimazole (MYCELEX) 10 mg isabell Take 1 tablet (10 mg total) by mouth in the morning and 1 tablet (10 mg total) in the evening and 1 tablet (10 mg total) before bedtime   (Patient not taking: Reported on 9/1/2022)   • Multiple Vitamins-Minerals (ICAPS AREDS 2 PO) Take by mouth (Patient not taking: Reported on 1/30/2023)   • Red Yeast Rice 600 MG TABS Take by mouth daily (Patient not taking: Reported on 6/7/2022)       Objective     Temp (!) 97 3 °F (36 3 °C) (Temporal)   Ht 6' 2" (1 88 m)   Wt 84 6 kg (186 lb 9 6 oz)   BMI 23 96 kg/m²     Physical Exam  Debra Vega DO

## 2023-02-03 ENCOUNTER — TELEPHONE (OUTPATIENT)
Dept: CARDIOLOGY CLINIC | Facility: HOSPITAL | Age: 88
End: 2023-02-03

## 2023-02-03 ENCOUNTER — OFFICE VISIT (OUTPATIENT)
Dept: FAMILY MEDICINE CLINIC | Facility: CLINIC | Age: 88
End: 2023-02-03

## 2023-02-03 VITALS
RESPIRATION RATE: 18 BRPM | HEIGHT: 74 IN | BODY MASS INDEX: 23.79 KG/M2 | DIASTOLIC BLOOD PRESSURE: 78 MMHG | WEIGHT: 185.4 LBS | TEMPERATURE: 97.3 F | SYSTOLIC BLOOD PRESSURE: 128 MMHG | HEART RATE: 76 BPM

## 2023-02-03 DIAGNOSIS — S01.01XS LACERATION OF SCALP, SEQUELA: ICD-10-CM

## 2023-02-03 DIAGNOSIS — Z48.02 ENCOUNTER FOR STAPLE REMOVAL: Primary | ICD-10-CM

## 2023-02-03 DIAGNOSIS — L85.3 DRY SKIN DERMATITIS: ICD-10-CM

## 2023-02-03 DIAGNOSIS — S80.11XA HEMATOMA OF RIGHT LOWER LEG: ICD-10-CM

## 2023-02-03 RX ORDER — AMMONIUM LACTATE 12 G/100G
CREAM TOPICAL AS NEEDED
Qty: 385 G | Refills: 0 | Status: SHIPPED | OUTPATIENT
Start: 2023-02-03

## 2023-02-03 NOTE — PROGRESS NOTES
Name: Karoline Seymour      : 1928      MRN: [id number]  Encounter Provider: Addy Cole DO  Encounter Date: 2/3/2023   Encounter department: [address] [place] [address] Road     1  Encounter for staple removal  Pt tolerated removal well Well healed laceration  He can resume washing his hair with gentle cleansing    2  Laceration of scalp, sequela  Healing well No other intervention needed     3  Hematoma of right lower leg  Elevate at rest and limit activity as it will take time for resolution The discoloration has faded quite a bit since visit earlier in week but pt aware it will take time            Subjective      HPI  Review of Systems   Constitutional: Negative for chills and fever  HENT: Negative  Respiratory: Negative for cough and shortness of breath  Cardiovascular: Negative for chest pain  Musculoskeletal: Positive for gait problem  Skin: Positive for color change and wound  Neurological: Negative for dizziness, light-headedness and headaches  Psychiatric/Behavioral: The patient is not nervous/anxious  Current Outpatient Medications on File Prior to Visit   Medication Sig   • ammonium lactate (LAC-HYDRIN) 12 % lotion Apply topically 2 (two) times a day as needed for dry skin   • Ascorbic Acid (VITAMIN C) 1000 MG tablet Take 1 tablet by mouth daily   • aspirin 325 mg tablet Take 1 tablet by mouth daily   • Cholecalciferol (VITAMIN D3) 1000 units CAPS Take 1 tablet by mouth daily   • clotrimazole (MYCELEX) 10 mg isabell Take 1 tablet (10 mg total) by mouth in the morning and 1 tablet (10 mg total) in the evening and 1 tablet (10 mg total) before bedtime  • cyanocobalamin (VITAMIN B-12) 100 mcg tablet Take 1 tablet by mouth daily   • finasteride (PROSCAR) 5 mg tablet TAKE 1 TABLET (5 MG TOTAL) BY MOUTH DAILY     • metoprolol succinate (TOPROL-XL) 50 mg 24 hr tablet TAKE 1 5 TABLET DAILY   • Multiple Vitamins-Minerals (ICAPS AREDS 2 PO) Take by mouth   • Red Yeast Rice 600 MG TABS Take by mouth daily   • tamsulosin (FLOMAX) 0 4 mg TAKE 1 CAPSULE BY MOUTH EVERY DAY       Objective     /78   Pulse 76   Temp (!) 97 3 °F (36 3 °C) (Temporal)   Resp 18   Ht 6' 2" (1 88 m)   Wt 84 1 kg (185 lb 6 4 oz)   BMI 23 80 kg/m²     Physical Exam  Constitutional:       Appearance: Normal appearance  HENT:      Head: Normocephalic and atraumatic  Eyes:      General: No scleral icterus  Cardiovascular:      Rate and Rhythm: Normal rate and regular rhythm  Pulmonary:      Effort: Pulmonary effort is normal  No respiratory distress  Breath sounds: Normal breath sounds  No wheezing  Abdominal:      General: Bowel sounds are normal       Palpations: Abdomen is soft  Musculoskeletal:         General: Swelling, tenderness and deformity present  Skin:     General: Skin is dry  Coloration: Skin is not jaundiced  Findings: Bruising and erythema present  Neurological:      General: No focal deficit present  Mental Status: He is alert and oriented to person, place, and time  Mental status is at baseline  Gait: Gait abnormal    Psychiatric:         Mood and Affect: Mood normal          Behavior: Behavior normal          Thought Content:  Thought content normal          Judgment: Judgment normal        Antoine Hard, DO

## 2023-02-03 NOTE — TELEPHONE ENCOUNTER
Attempted to contact Pavan Barron multiple times to schedule his next appointment with cardiology  A letter has been sent to have him contact our office

## 2023-02-10 ENCOUNTER — HOSPITAL ENCOUNTER (OUTPATIENT)
Dept: CT IMAGING | Facility: HOSPITAL | Age: 88
Discharge: HOME | End: 2023-02-10
Attending: INTERNAL MEDICINE

## 2023-02-10 ENCOUNTER — TELEPHONE (OUTPATIENT)
Dept: FAMILY MEDICINE CLINIC | Facility: CLINIC | Age: 88
End: 2023-02-10

## 2023-02-10 DIAGNOSIS — R93.89 ABNORMAL CT OF THE CHEST: ICD-10-CM

## 2023-02-10 NOTE — TELEPHONE ENCOUNTER
Pt states he is in a lot of pain, asking for some kind of painkiller  Said he already takes aspirin and it doesn't do anything for the pain  Please advise

## 2023-02-10 NOTE — TELEPHONE ENCOUNTER
Safest with his other meds is Extra strength Tylenol Take 2 tablets three times/day (schedule) meds  He can be set up with surgery for the hematoma to recheck but generally they monitor   Stay off the leg/elevate and would use warm compresses to the area for comfort as well

## 2023-02-14 ENCOUNTER — TELEPHONE (OUTPATIENT)
Dept: FAMILY MEDICINE CLINIC | Facility: CLINIC | Age: 88
End: 2023-02-14

## 2023-02-14 DIAGNOSIS — J84.9 INTERSTITIAL LUNG DISEASE (HCC): Primary | ICD-10-CM

## 2023-02-14 NOTE — TELEPHONE ENCOUNTER
Pt needs to followup with pulmonary - CT suggests changes that are increased since last Ct and could represent scarring or interstitial disease  There also is a thoracic compression fracture - new from prior Ct scan but not sure when it occurred perhaps during the fall he had in recent past

## 2023-02-14 NOTE — TELEPHONE ENCOUNTER
Daughter is going to tell pt your recommendations and the results  Can you please place referral for pt

## 2023-02-14 NOTE — TELEPHONE ENCOUNTER
Called for results of ct scan, was not read yet and we did not order  She was concerned due to patients age so I called x-ray to see if they can get it read  Lynnette from x-ray called with findings but can't get hold of pulmonary  Can you review and let her know the results on patient?

## 2023-03-10 ENCOUNTER — REMOTE DEVICE CLINIC VISIT (OUTPATIENT)
Dept: CARDIOLOGY CLINIC | Facility: CLINIC | Age: 88
End: 2023-03-10

## 2023-03-10 DIAGNOSIS — Z95.0 PRESENCE OF CARDIAC PACEMAKER: Primary | ICD-10-CM

## 2023-03-10 NOTE — PROGRESS NOTES
Results for orders placed or performed in visit on 03/10/23   Cardiac EP device report    Narrative    SJM-DUAL CHAMBER PPM (VVIR 60)/ NOT MRI CONDITIONAL  MERLIN TRANSMISSION: BATTERY VOLTAGE ADEQUATE (1 3 YRS)  : >99% (>40%~CHB)  ALL AVAILABLE LEAD PARAMETERS WITHIN NORMAL LIMITS  NO SIGNIFICANT HIGH RATE EPISODES  PACEMAKER FUNCTIONING APPROPRIATELY    [address] [place] [address]

## 2023-03-31 PROBLEM — S01.01XA SCALP LACERATION: Status: RESOLVED | Noted: 2023-01-30 | Resolved: 2023-03-31

## 2023-05-24 ENCOUNTER — RA CDI HCC (OUTPATIENT)
Dept: OTHER | Facility: HOSPITAL | Age: 88
End: 2023-05-24

## 2023-05-24 NOTE — PROGRESS NOTES
[address]  coding opportunities       Chart reviewed, no opportunity found: CHART REVIEWED, NO OPPORTUNITY FOUND        Patients Insurance     Medicare Insurance: Medicare

## 2023-06-01 ENCOUNTER — OFFICE VISIT (OUTPATIENT)
Dept: FAMILY MEDICINE CLINIC | Facility: CLINIC | Age: 88
End: 2023-06-01

## 2023-06-01 VITALS
WEIGHT: 184.2 LBS | HEART RATE: 76 BPM | RESPIRATION RATE: 18 BRPM | TEMPERATURE: 98.1 F | HEIGHT: 74 IN | BODY MASS INDEX: 23.64 KG/M2 | DIASTOLIC BLOOD PRESSURE: 78 MMHG | SYSTOLIC BLOOD PRESSURE: 126 MMHG

## 2023-06-01 DIAGNOSIS — E78.00 HYPERCHOLESTEROLEMIA: ICD-10-CM

## 2023-06-01 DIAGNOSIS — M54.2 CERVICALGIA: Primary | ICD-10-CM

## 2023-06-01 DIAGNOSIS — I48.91 ATRIAL FIBRILLATION, UNSPECIFIED TYPE (HCC): ICD-10-CM

## 2023-06-01 DIAGNOSIS — D69.6 PLATELETS DECREASED (HCC): ICD-10-CM

## 2023-06-01 DIAGNOSIS — N18.31 STAGE 3A CHRONIC KIDNEY DISEASE (HCC): ICD-10-CM

## 2023-06-01 NOTE — PROGRESS NOTES
Name: Jefry Martins      : 1928      MRN: [id number]  Encounter Provider: Shana Subramanian DO  Encounter Date: 2023   Encounter department: [address]     1  Cervicalgia  -     Ambulatory Referral to Physical Therapy; Future  He wants to trial Pt He can use ice to area as well as topical and prn Tylenol    2  Platelets decreased (HCC)  -     CBC and Platelet; Future    3  Atrial fibrillation, unspecified type ([facility] )  -     Lipid panel; Future  -     Comprehensive metabolic panel; Future  Followed by cardiology Stable on current rx   4  Stage 3a chronic kidney disease ([facility] )  Stay hydrated recheck labs    5  Hypercholesterolemia  -     Lipid panel; Future        Depression Screening and Follow-up Plan: Patient was screened for depression during today's encounter  They screened negative with a PHQ-2 score of 0  Rto 4 months    Subjective      HPI   Pt doing ok Residual neck pain from fall months ago Wants to trial therapy for his neck He does not take anything as of now but it is bothersome No chest pain or sobb He does manage at home with his daughter's help at times Appetite good He denies sob or cp and no recurrent falls He drives locally but not as often now   Review of Systems   Constitutional: Negative for chills and fever  HENT: Negative  Eyes: Negative for visual disturbance  Respiratory: Negative for cough and shortness of breath  Cardiovascular: Negative for chest pain, palpitations and leg swelling  Gastrointestinal: Negative for abdominal distention and abdominal pain  Genitourinary: Negative  Musculoskeletal: Positive for arthralgias and gait problem  Neurological: Negative for dizziness, light-headedness and headaches  Psychiatric/Behavioral: Negative for sleep disturbance  The patient is not nervous/anxious          Current Outpatient Medications on File Prior to Visit   Medication Sig   • ammonium lactate (LAC-HYDRIN) 12 % cream Apply "topically as needed for dry skin   • Cholecalciferol (VITAMIN D3) 1000 units CAPS Take 1 tablet by mouth daily   • clotrimazole (MYCELEX) 10 mg isabell Take 1 tablet (10 mg total) by mouth in the morning and 1 tablet (10 mg total) in the evening and 1 tablet (10 mg total) before bedtime  • finasteride (PROSCAR) 5 mg tablet TAKE 1 TABLET (5 MG TOTAL) BY MOUTH DAILY  • metoprolol succinate (TOPROL-XL) 50 mg 24 hr tablet TAKE 1 5 TABLET DAILY   • Multiple Vitamins-Minerals (ICAPS AREDS 2 PO) Take by mouth   • tamsulosin (FLOMAX) 0 4 mg TAKE 1 CAPSULE BY MOUTH EVERY DAY   • Ascorbic Acid (VITAMIN C) 1000 MG tablet Take 1 tablet by mouth daily (Patient not taking: Reported on 6/1/2023)   • aspirin 325 mg tablet Take 1 tablet by mouth daily (Patient not taking: Reported on 5/16/2023)   • cyanocobalamin (VITAMIN B-12) 100 mcg tablet Take 1 tablet by mouth daily (Patient not taking: Reported on 6/1/2023)   • Red Yeast Rice 600 MG TABS Take by mouth daily (Patient not taking: Reported on 5/16/2023)       Objective     /78   Pulse 76   Temp 98 1 °F (36 7 °C) (Temporal)   Resp 18   Ht 6' 2\" (1 88 m)   Wt 83 6 kg (184 lb 3 2 oz)   BMI 23 65 kg/m²     Physical Exam  Vitals reviewed  Constitutional:       General: He is not in acute distress  Appearance: Normal appearance  He is not ill-appearing, toxic-appearing or diaphoretic  HENT:      Head: Normocephalic and atraumatic  Right Ear: External ear normal       Left Ear: External ear normal       Nose: Nose normal       Mouth/Throat:      Mouth: Mucous membranes are moist    Eyes:      General: No scleral icterus  Extraocular Movements: Extraocular movements intact  Conjunctiva/sclera: Conjunctivae normal       Pupils: Pupils are equal, round, and reactive to light  Cardiovascular:      Rate and Rhythm: Normal rate  Rhythm irregular  Pulses: Normal pulses  Pulmonary:      Effort: Pulmonary effort is normal  No respiratory distress      " Breath sounds: Normal breath sounds  No wheezing  Abdominal:      General: Bowel sounds are normal  There is no distension  Palpations: Abdomen is soft  Tenderness: There is no abdominal tenderness  Musculoskeletal:      Cervical back: Normal range of motion and neck supple  Right lower leg: No edema  Left lower leg: No edema  Lymphadenopathy:      Cervical: No cervical adenopathy  Skin:     General: Skin is warm and dry  Coloration: Skin is not jaundiced or pale  Neurological:      General: No focal deficit present  Mental Status: He is alert and oriented to person, place, and time  Mental status is at baseline  Cranial Nerves: No cranial nerve deficit  Psychiatric:         Mood and Affect: Mood normal          Behavior: Behavior normal          Thought Content:  Thought content normal          Judgment: Judgment normal        Bj Kolb DO

## 2023-06-14 ENCOUNTER — REMOTE DEVICE CLINIC VISIT (OUTPATIENT)
Dept: CARDIOLOGY CLINIC | Facility: CLINIC | Age: 88
End: 2023-06-14
Payer: MEDICARE

## 2023-06-14 DIAGNOSIS — Z95.0 PRESENCE OF PERMANENT CARDIAC PACEMAKER: Primary | ICD-10-CM

## 2023-06-14 PROCEDURE — 93296 REM INTERROG EVL PM/IDS: CPT | Performed by: INTERNAL MEDICINE

## 2023-06-14 PROCEDURE — 93294 REM INTERROG EVL PM/LDLS PM: CPT | Performed by: INTERNAL MEDICINE

## 2023-06-14 NOTE — PROGRESS NOTES
SJM DUAL CHAMBER PPM (VVIR 60)/ NOT MRI CONDITIONAL   MERLIN TRANSMISSION:  BATTERY VOLTAGE ADEQUATE (1 1 YR )    >99% (VVIR 60 PPM)    ALL LEAD PARAMETERS WITHIN NORMAL LIMITS   NO SIGNIFICANT HIGH RATE EPISODES   NORMAL DEVICE FUNCTION  Meredith Mata

## 2023-07-14 ENCOUNTER — TRANSITIONAL CARE MANAGEMENT (OUTPATIENT)
Dept: FAMILY MEDICINE CLINIC | Facility: CLINIC | Age: 88
End: 2023-07-14

## 2023-09-13 ENCOUNTER — REMOTE DEVICE CLINIC VISIT (OUTPATIENT)
Dept: CARDIOLOGY CLINIC | Facility: CLINIC | Age: 88
End: 2023-09-13
Payer: MEDICARE

## 2023-09-13 DIAGNOSIS — Z95.0 CARDIAC PACEMAKER IN SITU: Primary | ICD-10-CM

## 2023-09-13 PROCEDURE — 93294 REM INTERROG EVL PM/LDLS PM: CPT | Performed by: INTERNAL MEDICINE

## 2023-09-13 PROCEDURE — 93296 REM INTERROG EVL PM/IDS: CPT | Performed by: INTERNAL MEDICINE

## 2023-09-13 NOTE — PROGRESS NOTES
Results for orders placed or performed in visit on 09/13/23   Cardiac EP device report    Narrative    SJM DUAL CHAMBER PPM (VVIR 60)/ NOT MRI CONDITIONAL  MERLIN TRANSMISSION: BATTERY VOLTAGE NEARING EDILMA (10.2 MOS). WILL SCHEDULE MONTHLY BATTERY CHECKS. -96% (>40% [email or web address]). ALL AVAILABLE LEAD PARAMETERS WITHIN NORMAL LIMITS. NO SIGNIFICANT HIGH RATE EPISODES. NORMAL DEVICE FUNCTION.  GV

## 2023-10-06 ENCOUNTER — OFFICE VISIT (OUTPATIENT)
Dept: FAMILY MEDICINE CLINIC | Facility: CLINIC | Age: 88
End: 2023-10-06
Payer: MEDICARE

## 2023-10-06 ENCOUNTER — APPOINTMENT (OUTPATIENT)
Dept: RADIOLOGY | Facility: MEDICAL CENTER | Age: 88
End: 2023-10-06
Payer: MEDICARE

## 2023-10-06 ENCOUNTER — TELEPHONE (OUTPATIENT)
Dept: FAMILY MEDICINE CLINIC | Facility: CLINIC | Age: 88
End: 2023-10-06

## 2023-10-06 VITALS
HEART RATE: 70 BPM | HEIGHT: 74 IN | DIASTOLIC BLOOD PRESSURE: 70 MMHG | SYSTOLIC BLOOD PRESSURE: 124 MMHG | RESPIRATION RATE: 18 BRPM | BODY MASS INDEX: 19.69 KG/M2 | WEIGHT: 153.4 LBS | TEMPERATURE: 97.4 F

## 2023-10-06 DIAGNOSIS — M54.6 ACUTE THORACIC BACK PAIN, UNSPECIFIED BACK PAIN LATERALITY: Primary | ICD-10-CM

## 2023-10-06 DIAGNOSIS — I50.31 ACUTE DIASTOLIC HEART FAILURE (HCC): ICD-10-CM

## 2023-10-06 DIAGNOSIS — M54.6 ACUTE THORACIC BACK PAIN, UNSPECIFIED BACK PAIN LATERALITY: ICD-10-CM

## 2023-10-06 PROBLEM — Z48.02 ENCOUNTER FOR STAPLE REMOVAL: Status: RESOLVED | Noted: 2023-02-03 | Resolved: 2023-10-06

## 2023-10-06 PROBLEM — S69.92XS THUMB INJURY, LEFT, SEQUELA: Status: RESOLVED | Noted: 2023-01-30 | Resolved: 2023-10-06

## 2023-10-06 PROBLEM — S80.11XA HEMATOMA OF RIGHT LOWER LEG: Status: RESOLVED | Noted: 2023-01-30 | Resolved: 2023-10-06

## 2023-10-06 PROCEDURE — 72072 X-RAY EXAM THORAC SPINE 3VWS: CPT

## 2023-10-06 PROCEDURE — 99214 OFFICE O/P EST MOD 30 MIN: CPT | Performed by: INTERNAL MEDICINE

## 2023-10-06 RX ORDER — PREDNISONE 20 MG/1
20 TABLET ORAL DAILY
Qty: 5 TABLET | Refills: 0 | Status: SHIPPED | OUTPATIENT
Start: 2023-10-06

## 2023-10-06 NOTE — PROGRESS NOTES
Name: Abdon Amaya      : 1928      MRN: [id number]  Encounter Provider: Flex Tellez DO  Encounter Date: 10/6/2023   Encounter department: [address]     1. Acute thoracic back pain, unspecified back pain laterality  -     XR spine thoracic 3 vw; Future; Expected date: 10/06/2023  -     predniSONE 20 mg tablet; Take 1 tablet (20 mg total) by mouth daily  Tylenol TID    2. Acute diastolic heart failure ([address])  Discussio with pt and his daughter about goals of care  Pt wants to be in his home as long as able and be comfortable He does not want to return to the hospital and is agreeable to transition to  Hospice care once we have more info regarding his back   His daughter is supportive and provides in home care along with her sister so that will continue         Depression Screening and Follow-up Plan: Patient was screened for depression during today's encounter. They screened negative with a PHQ-2 score of 2. Transition to hospice   Subjective      HPI   Pt has acute mid back pain No fall He thinks he changed position in bed and developed pain Saw chiropractor who was hesitant to treat with manipulation due to discomfort He has been less active in the home and his daughters and neighbors help often He does not want to go to the hospital and wants to stay in his home and be comfortable   Review of Systems   Constitutional: Positive for activity change. Negative for chills and fever. HENT: Negative. Eyes: Negative for visual disturbance. Respiratory: Positive for shortness of breath. Negative for cough. Cardiovascular: Negative for chest pain, palpitations and leg swelling. Gastrointestinal: Negative for abdominal distention and abdominal pain. Genitourinary: Positive for frequency. Musculoskeletal: Positive for arthralgias and back pain. Neurological: Positive for dizziness. Negative for light-headedness and numbness.    Psychiatric/Behavioral: Negative for sleep disturbance. The patient is not nervous/anxious. Current Outpatient Medications on File Prior to Visit   Medication Sig   • finasteride (PROSCAR) 5 mg tablet TAKE 1 TABLET (5 MG TOTAL) BY MOUTH DAILY. • metoprolol succinate (TOPROL-XL) 50 mg 24 hr tablet TAKE 1.5 TABLET DAILY   • tamsulosin (FLOMAX) 0.4 mg TAKE 1 CAPSULE BY MOUTH EVERY DAY   • Ascorbic Acid (VITAMIN C) 1000 MG tablet Take 1 tablet by mouth daily (Patient not taking: Reported on 10/6/2023)   • aspirin 325 mg tablet Take 1 tablet by mouth daily (Patient not taking: Reported on 5/16/2023)   • clotrimazole (MYCELEX) 10 mg isabell Take 1 tablet (10 mg total) by mouth in the morning and 1 tablet (10 mg total) in the evening and 1 tablet (10 mg total) before bedtime. (Patient not taking: Reported on 10/6/2023)   • cyanocobalamin (VITAMIN B-12) 100 mcg tablet Take 1 tablet by mouth daily (Patient not taking: Reported on 6/1/2023)   • Multiple Vitamins-Minerals (ICAPS AREDS 2 PO) Take by mouth (Patient not taking: Reported on 10/6/2023)   • Red Yeast Rice 600 MG TABS Take by mouth daily (Patient not taking: Reported on 5/16/2023)   • [DISCONTINUED] ammonium lactate (LAC-HYDRIN) 12 % cream Apply topically as needed for dry skin (Patient not taking: Reported on 10/6/2023)   • [DISCONTINUED] Cholecalciferol (VITAMIN D3) 1000 units CAPS Take 1 tablet by mouth daily (Patient not taking: Reported on 10/6/2023)       Objective     Temp (!) 97.4 °F (36.3 °C) (Temporal)   Ht 6' 2" (1.88 m)   Wt 69.6 kg (153 lb 6.4 oz)   BMI 19.70 kg/m²     Physical Exam  Vitals reviewed. Constitutional:       General: He is not in acute distress. Appearance: Normal appearance. He is toxic-appearing. He is not ill-appearing or diaphoretic. HENT:      Head: Normocephalic and atraumatic.       Right Ear: External ear normal.      Left Ear: External ear normal.      Nose: Nose normal.      Mouth/Throat:      Mouth: Mucous membranes are moist.   Eyes: General: No scleral icterus. Cardiovascular:      Rate and Rhythm: Normal rate. Pulses: Normal pulses. Heart sounds: Normal heart sounds. Pulmonary:      Effort: Pulmonary effort is normal. No respiratory distress. Breath sounds: Normal breath sounds. No wheezing. Musculoskeletal:         General: Tenderness and deformity present. Cervical back: Normal range of motion and neck supple. Right lower leg: No edema. Left lower leg: No edema. Lymphadenopathy:      Cervical: No cervical adenopathy. Neurological:      General: No focal deficit present. Mental Status: He is alert and oriented to person, place, and time. Mental status is at baseline. Cranial Nerves: No cranial nerve deficit. Sensory: No sensory deficit. Psychiatric:         Mood and Affect: Mood normal.         Behavior: Behavior normal.         Thought Content:  Thought content normal.         Judgment: Judgment normal.       Shazia Knutson DO

## 2023-10-06 NOTE — TELEPHONE ENCOUNTER
Edvin Sanders ([email or web address]) called and Tamica Vidal hurt his back last week. He did go to [facility] but Tylenol is not helping. She also would like to talk to him regarding Hospice care or to come live with her. She is requesting an appt today.     Please advise  Thank you

## 2023-10-06 NOTE — TELEPHONE ENCOUNTER
Lynnette with radiology called, said there are significant findings on pt's xray of thoracic spine. Please advise.

## 2023-10-09 ENCOUNTER — TELEPHONE (OUTPATIENT)
Dept: FAMILY MEDICINE CLINIC | Facility: CLINIC | Age: 88
End: 2023-10-09

## 2023-10-09 NOTE — TELEPHONE ENCOUNTER
Pt's daughter called stating pt is doing better on the prednisone. He is sleeping at night and has less discomfort in the mornings. Daughter asking what do we do after his last pill which is tomorrow night. Daughter stated a procedure was discussed but had to wait until results of xray. So asking what next steps are. Please advise.

## 2023-10-09 NOTE — TELEPHONE ENCOUNTER
Daughter made aware, and will discuss MRI options with pt. Daughter will call back if decided to do MRI.

## 2023-10-09 NOTE — TELEPHONE ENCOUNTER
If she wants to see if he is a candidate for the procedure he would need to be scheduled for MRI   Once that is done they would have to be evaluated to see if he is a candidate  Would see how he is off the prednisone and continue to use TID Tylenol     If they do not want MRI then I would also recommend hospice evaluation as discussed Friday

## 2023-10-13 ENCOUNTER — REMOTE DEVICE CLINIC VISIT (OUTPATIENT)
Dept: CARDIOLOGY CLINIC | Facility: CLINIC | Age: 88
End: 2023-10-13

## 2023-10-13 DIAGNOSIS — Z95.0 CARDIAC PACEMAKER IN SITU: Primary | ICD-10-CM

## 2023-10-13 PROCEDURE — RECHECK: Performed by: INTERNAL MEDICINE

## 2023-10-13 NOTE — PROGRESS NOTES
Results for orders placed or performed in visit on 10/13/23   Cardiac EP device report    Narrative    SJM DUAL CHAMBER PPM (VVIR 60)/ NOT MRI CONDITIONAL  MERLIN TRANSMISSION TO CHECK BATTERY STATUS- NB: BATTERY VOLTAGE NEARING EDILMA (9.2 MOS). WILL SCHEDULE MONTHLY BATTERY CHECKS. -98% (>40% [email or web address]). ALL AVAILABLE LEAD PARAMETERS WITHIN NORMAL LIMITS. NO SIGNIFICANT HIGH RATE EPISODES. NORMAL DEVICE FUNCTION.  GV

## 2023-11-10 ENCOUNTER — TELEPHONE (OUTPATIENT)
Dept: FAMILY MEDICINE CLINIC | Facility: CLINIC | Age: 88
End: 2023-11-10

## 2023-11-10 ENCOUNTER — HOSPICE ADMISSION (OUTPATIENT)
Dept: HOME HOSPICE | Facility: HOSPICE | Age: 88
End: 2023-11-10
Payer: MEDICARE

## 2023-11-10 ENCOUNTER — HOME CARE VISIT (OUTPATIENT)
Dept: HOME HEALTH SERVICES | Facility: HOME HEALTHCARE | Age: 88
End: 2023-11-10
Payer: MEDICARE

## 2023-11-10 DIAGNOSIS — I50.31 ACUTE CONGESTIVE HEART FAILURE WITH LEFT VENTRICULAR DIASTOLIC DYSFUNCTION (HCC): Primary | ICD-10-CM

## 2023-11-10 NOTE — TELEPHONE ENCOUNTER
I willplace order I did not get an official confirmation from her after he had the testing so did not place prior

## 2023-11-10 NOTE — TELEPHONE ENCOUNTER
Pt's daughter called stating hospice did not reach out to them, when I was looking into chart I did  not see referral placed. Please advise.

## 2023-11-10 NOTE — TELEPHONE ENCOUNTER
Spoke with daughter she stated it was a misunderstanding and asking if you could please place the referral. Thank you

## 2023-11-13 ENCOUNTER — REMOTE DEVICE CLINIC VISIT (OUTPATIENT)
Dept: CARDIOLOGY CLINIC | Facility: CLINIC | Age: 88
End: 2023-11-13

## 2023-11-13 ENCOUNTER — HOME CARE VISIT (OUTPATIENT)
Dept: HOME HEALTH SERVICES | Facility: HOME HEALTHCARE | Age: 88
End: 2023-11-13
Payer: MEDICARE

## 2023-11-13 VITALS
DIASTOLIC BLOOD PRESSURE: 55 MMHG | RESPIRATION RATE: 18 BRPM | BODY MASS INDEX: 19.64 KG/M2 | HEART RATE: 61 BPM | SYSTOLIC BLOOD PRESSURE: 113 MMHG | WEIGHT: 153 LBS | TEMPERATURE: 97.1 F

## 2023-11-13 DIAGNOSIS — Z95.0 CARDIAC PACEMAKER IN SITU: Primary | ICD-10-CM

## 2023-11-13 PROCEDURE — G0299 HHS/HOSPICE OF RN EA 15 MIN: HCPCS

## 2023-11-13 PROCEDURE — 10330057 MEDICATION, GENERAL

## 2023-11-13 PROCEDURE — RECHECK

## 2023-11-13 NOTE — PROGRESS NOTES
Results for orders placed or performed in visit on 11/13/23   Cardiac EP device report    Narrative    SJM DUAL CHAMBER PPM (VVIR 60)/ NOT MRI CONDITIONAL  NB: MERLIN TRANSMISSION: BATTERY VOLTAGE NEARING EDILMA (8.2 MOS). WILL SCHEDULE MONTHLY BATTERY CHECKS.  >99% (>40%/CHB/VVIR 60PPM). ALL AVAILABLE LEAD PARAMETERS WITHIN NORMAL LIMITS. NO SIGNIFICANT HIGH RATE EPISODES. NORMAL DEVICE FUNCTION.   ES

## 2023-11-14 ENCOUNTER — HOME CARE VISIT (OUTPATIENT)
Dept: HOME HEALTH SERVICES | Facility: HOME HEALTHCARE | Age: 88
End: 2023-11-14
Payer: MEDICARE

## 2023-11-14 PROCEDURE — G0156 HHCP-SVS OF AIDE,EA 15 MIN: HCPCS

## 2023-11-15 ENCOUNTER — HOME CARE VISIT (OUTPATIENT)
Dept: HOME HOSPICE | Facility: HOSPICE | Age: 88
End: 2023-11-15
Payer: MEDICARE

## 2023-11-16 ENCOUNTER — HOME CARE VISIT (OUTPATIENT)
Dept: HOME HOSPICE | Facility: HOSPICE | Age: 88
End: 2023-11-16
Payer: MEDICARE

## 2023-11-16 ENCOUNTER — HOME CARE VISIT (OUTPATIENT)
Dept: HOME HEALTH SERVICES | Facility: HOME HEALTHCARE | Age: 88
End: 2023-11-16
Payer: MEDICARE

## 2023-11-16 PROCEDURE — G0299 HHS/HOSPICE OF RN EA 15 MIN: HCPCS

## 2023-11-20 ENCOUNTER — HOME CARE VISIT (OUTPATIENT)
Dept: HOME HOSPICE | Facility: HOSPICE | Age: 88
End: 2023-11-20
Payer: MEDICARE

## 2023-11-20 VITALS
HEART RATE: 68 BPM | TEMPERATURE: 98.6 F | RESPIRATION RATE: 16 BRPM | DIASTOLIC BLOOD PRESSURE: 70 MMHG | SYSTOLIC BLOOD PRESSURE: 120 MMHG

## 2023-11-20 PROCEDURE — G0155 HHCP-SVS OF CSW,EA 15 MIN: HCPCS

## 2023-11-21 ENCOUNTER — HOME CARE VISIT (OUTPATIENT)
Dept: HOME HEALTH SERVICES | Facility: HOME HEALTHCARE | Age: 88
End: 2023-11-21
Payer: MEDICARE

## 2023-11-21 PROCEDURE — G0299 HHS/HOSPICE OF RN EA 15 MIN: HCPCS

## 2023-11-22 VITALS
SYSTOLIC BLOOD PRESSURE: 122 MMHG | HEART RATE: 80 BPM | DIASTOLIC BLOOD PRESSURE: 66 MMHG | TEMPERATURE: 98.2 F | RESPIRATION RATE: 16 BRPM

## 2023-11-28 ENCOUNTER — HOME CARE VISIT (OUTPATIENT)
Dept: HOME HOSPICE | Facility: HOSPICE | Age: 88
End: 2023-11-28
Payer: MEDICARE

## 2023-11-28 ENCOUNTER — HOME CARE VISIT (OUTPATIENT)
Dept: HOME HEALTH SERVICES | Facility: HOME HEALTHCARE | Age: 88
End: 2023-11-28
Payer: MEDICARE

## 2023-11-28 VITALS — RESPIRATION RATE: 18 BRPM | HEART RATE: 80 BPM | TEMPERATURE: 96.4 F

## 2023-11-28 PROCEDURE — G0299 HHS/HOSPICE OF RN EA 15 MIN: HCPCS

## 2023-12-05 ENCOUNTER — HOME CARE VISIT (OUTPATIENT)
Dept: HOME HEALTH SERVICES | Facility: HOME HEALTHCARE | Age: 88
End: 2023-12-05
Payer: MEDICARE

## 2023-12-05 VITALS
SYSTOLIC BLOOD PRESSURE: 116 MMHG | HEART RATE: 68 BPM | RESPIRATION RATE: 18 BRPM | DIASTOLIC BLOOD PRESSURE: 70 MMHG | TEMPERATURE: 98 F

## 2023-12-05 PROCEDURE — G0299 HHS/HOSPICE OF RN EA 15 MIN: HCPCS

## 2023-12-12 ENCOUNTER — HOME CARE VISIT (OUTPATIENT)
Dept: HOME HEALTH SERVICES | Facility: HOME HEALTHCARE | Age: 88
End: 2023-12-12
Payer: MEDICARE

## 2023-12-12 VITALS
RESPIRATION RATE: 16 BRPM | DIASTOLIC BLOOD PRESSURE: 58 MMHG | HEART RATE: 72 BPM | TEMPERATURE: 97 F | SYSTOLIC BLOOD PRESSURE: 118 MMHG

## 2023-12-12 PROCEDURE — G0299 HHS/HOSPICE OF RN EA 15 MIN: HCPCS

## 2023-12-13 ENCOUNTER — REMOTE DEVICE CLINIC VISIT (OUTPATIENT)
Dept: CARDIOLOGY CLINIC | Facility: CLINIC | Age: 88
End: 2023-12-13
Payer: MEDICARE

## 2023-12-13 DIAGNOSIS — Z95.0 PRESENCE OF PERMANENT CARDIAC PACEMAKER: Primary | ICD-10-CM

## 2023-12-13 PROCEDURE — 93294 REM INTERROG EVL PM/LDLS PM: CPT | Performed by: INTERNAL MEDICINE

## 2023-12-13 PROCEDURE — 93296 REM INTERROG EVL PM/IDS: CPT | Performed by: INTERNAL MEDICINE

## 2023-12-13 NOTE — PROGRESS NOTES
Results for orders placed or performed in visit on 12/13/23   Cardiac EP device report    Narrative    SJM DUAL CHAMBER PPM (VVIR 60)/ NOT MRI CONDITIONAL  MERLIN TRANSMISSION: BATTERY VOLTAGE NEARING EDILMA (7.2 MTHS). WILL SCHEDULE MONTHLY BATTERY CHECKS.  >99% ALL AVAILABLE LEAD PARAMETERS WITHIN NORMAL LIMITS. NO SIGNIFICANT HIGH RATE EPISODES. NORMAL DEVICE FUNCTION.  AM

## 2023-12-19 ENCOUNTER — HOME CARE VISIT (OUTPATIENT)
Dept: HOME HEALTH SERVICES | Facility: HOME HEALTHCARE | Age: 88
End: 2023-12-19
Payer: MEDICARE

## 2023-12-19 VITALS
HEART RATE: 60 BPM | SYSTOLIC BLOOD PRESSURE: 104 MMHG | RESPIRATION RATE: 18 BRPM | DIASTOLIC BLOOD PRESSURE: 56 MMHG | TEMPERATURE: 97.2 F

## 2023-12-19 PROCEDURE — G0299 HHS/HOSPICE OF RN EA 15 MIN: HCPCS

## 2023-12-27 ENCOUNTER — HOME CARE VISIT (OUTPATIENT)
Dept: HOME HEALTH SERVICES | Facility: HOME HEALTHCARE | Age: 88
End: 2023-12-27
Payer: MEDICARE

## 2023-12-27 VITALS
SYSTOLIC BLOOD PRESSURE: 122 MMHG | RESPIRATION RATE: 16 BRPM | HEART RATE: 72 BPM | TEMPERATURE: 96.4 F | DIASTOLIC BLOOD PRESSURE: 56 MMHG

## 2023-12-27 PROCEDURE — G0299 HHS/HOSPICE OF RN EA 15 MIN: HCPCS

## 2024-01-01 ENCOUNTER — APPOINTMENT (EMERGENCY)
Dept: CT IMAGING | Facility: HOSPITAL | Age: 89
DRG: 082 | End: 2024-01-01
Payer: MEDICARE

## 2024-01-01 ENCOUNTER — HOME CARE VISIT (OUTPATIENT)
Dept: HOME HEALTH SERVICES | Facility: HOME HEALTHCARE | Age: 89
End: 2024-01-01
Payer: MEDICARE

## 2024-01-01 ENCOUNTER — HOME CARE VISIT (OUTPATIENT)
Dept: HOME HEALTH SERVICES | Facility: HOME HEALTHCARE | Age: 89
End: 2024-01-01

## 2024-01-01 ENCOUNTER — APPOINTMENT (EMERGENCY)
Dept: RADIOLOGY | Facility: HOSPITAL | Age: 89
DRG: 082 | End: 2024-01-01
Payer: MEDICARE

## 2024-01-01 ENCOUNTER — HOSPITAL ENCOUNTER (INPATIENT)
Facility: HOSPITAL | Age: 89
LOS: 4 days | DRG: 082 | End: 2024-06-26
Attending: EMERGENCY MEDICINE | Admitting: FAMILY MEDICINE
Payer: MEDICARE

## 2024-01-01 VITALS
DIASTOLIC BLOOD PRESSURE: 50 MMHG | SYSTOLIC BLOOD PRESSURE: 100 MMHG | HEART RATE: 70 BPM | RESPIRATION RATE: 24 BRPM | TEMPERATURE: 97.4 F

## 2024-01-01 VITALS
SYSTOLIC BLOOD PRESSURE: 131 MMHG | DIASTOLIC BLOOD PRESSURE: 46 MMHG | OXYGEN SATURATION: 93 % | WEIGHT: 131.2 LBS | BODY MASS INDEX: 15.98 KG/M2 | HEIGHT: 76 IN | TEMPERATURE: 97.2 F
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
## 2024-01-01 DIAGNOSIS — S32.591A CLOSED FRACTURE OF RIGHT INFERIOR PUBIC RAMUS, INITIAL ENCOUNTER (HCC): ICD-10-CM

## 2024-01-01 DIAGNOSIS — Z51.5 COMFORT MEASURES ONLY STATUS: ICD-10-CM

## 2024-01-01 DIAGNOSIS — R09.02 HYPOXIA: ICD-10-CM

## 2024-01-01 DIAGNOSIS — S12.691A OTHER CLOSED NONDISPLACED FRACTURE OF SEVENTH CERVICAL VERTEBRA, INITIAL ENCOUNTER (HCC): ICD-10-CM

## 2024-01-01 DIAGNOSIS — N32.0 BLADDER OBSTRUCTION: ICD-10-CM

## 2024-01-01 DIAGNOSIS — S06.5XAA SUBDURAL HEMATOMA, ACUTE (HCC): ICD-10-CM

## 2024-01-01 DIAGNOSIS — S12.600A: ICD-10-CM

## 2024-01-01 DIAGNOSIS — E43 SEVERE PROTEIN-CALORIE MALNUTRITION (HCC): ICD-10-CM

## 2024-01-01 LAB
ABO GROUP BLD: NORMAL
ALBUMIN SERPL BCG-MCNC: 3.7 G/DL (ref 3.5–5)
ALP SERPL-CCNC: 49 U/L (ref 34–104)
ALT SERPL W P-5'-P-CCNC: 10 U/L (ref 7–52)
ANION GAP SERPL CALCULATED.3IONS-SCNC: 14 MMOL/L (ref 4–13)
ANISOCYTOSIS BLD QL SMEAR: PRESENT
APTT PPP: 46 SECONDS (ref 23–37)
AST SERPL W P-5'-P-CCNC: 28 U/L (ref 13–39)
ATRIAL RATE: 178 BPM
BASOPHILS # BLD MANUAL: 0 THOUSAND/UL (ref 0–0.1)
BASOPHILS NFR MAR MANUAL: 0 % (ref 0–1)
BILIRUB SERPL-MCNC: 3.77 MG/DL (ref 0.2–1)
BLD GP AB SCN SERPL QL: NEGATIVE
BNP SERPL-MCNC: 883 PG/ML (ref 0–100)
BUN SERPL-MCNC: 39 MG/DL (ref 5–25)
CALCIUM SERPL-MCNC: 10 MG/DL (ref 8.4–10.2)
CARDIAC TROPONIN I PNL SERPL HS: 17 NG/L
CHLORIDE SERPL-SCNC: 103 MMOL/L (ref 96–108)
CK SERPL-CCNC: 144 U/L (ref 39–308)
CO2 SERPL-SCNC: 21 MMOL/L (ref 21–32)
CREAT SERPL-MCNC: 1.53 MG/DL (ref 0.6–1.3)
EOSINOPHIL # BLD MANUAL: 0 THOUSAND/UL (ref 0–0.4)
EOSINOPHIL NFR BLD MANUAL: 0 % (ref 0–6)
ERYTHROCYTE [DISTWIDTH] IN BLOOD BY AUTOMATED COUNT: 15.9 % (ref 11.6–15.1)
FLUAV RNA RESP QL NAA+PROBE: NEGATIVE
FLUBV RNA RESP QL NAA+PROBE: NEGATIVE
GFR SERPL CREATININE-BSD FRML MDRD: 37 ML/MIN/1.73SQ M
GLUCOSE SERPL-MCNC: 117 MG/DL (ref 65–140)
HCT VFR BLD AUTO: 31.4 % (ref 36.5–49.3)
HGB BLD-MCNC: 9.6 G/DL (ref 12–17)
INR PPP: 1.76 (ref 0.84–1.19)
LYMPHOCYTES # BLD AUTO: 1.94 THOUSAND/UL (ref 0.6–4.47)
LYMPHOCYTES # BLD AUTO: 2 % (ref 14–44)
MACROCYTES BLD QL AUTO: PRESENT
MCH RBC QN AUTO: 35.6 PG (ref 26.8–34.3)
MCHC RBC AUTO-ENTMCNC: 30.6 G/DL (ref 31.4–37.4)
MCV RBC AUTO: 116 FL (ref 82–98)
METAMYELOCYTE ABSOLUTE CT: 3.24 THOUSAND/UL (ref 0–0.1)
METAMYELOCYTES NFR BLD MANUAL: 5 % (ref 0–1)
MONOCYTES # BLD AUTO: 9.07 THOUSAND/UL (ref 0–1.22)
MONOCYTES NFR BLD: 14 % (ref 4–12)
MRSA NOSE QL CULT: NORMAL
MYELOCYTE ABSOLUTE CT: 3.24 THOUSAND/UL (ref 0–0.1)
MYELOCYTES NFR BLD MANUAL: 5 % (ref 0–1)
NEUTROPHILS # BLD MANUAL: 47.32 THOUSAND/UL (ref 1.85–7.62)
NEUTS BAND NFR BLD MANUAL: 5 % (ref 0–8)
NEUTS SEG NFR BLD AUTO: 68 % (ref 43–75)
OVALOCYTES BLD QL SMEAR: PRESENT
P AXIS: -63 DEGREES
PLATELET # BLD AUTO: 35 THOUSANDS/UL (ref 149–390)
PLATELET BLD QL SMEAR: ABNORMAL
POLYCHROMASIA BLD QL SMEAR: PRESENT
POTASSIUM SERPL-SCNC: 4.6 MMOL/L (ref 3.5–5.3)
PROT SERPL-MCNC: 7.8 G/DL (ref 6.4–8.4)
PROTHROMBIN TIME: 20.3 SECONDS (ref 11.6–14.5)
QRS AXIS: -75 DEGREES
QRSD INTERVAL: 136 MS
QT INTERVAL: 440 MS
QTC INTERVAL: 446 MS
RBC # BLD AUTO: 2.7 MILLION/UL (ref 3.88–5.62)
RBC MORPH BLD: PRESENT
RH BLD: POSITIVE
RSV RNA RESP QL NAA+PROBE: NEGATIVE
SARS-COV-2 RNA RESP QL NAA+PROBE: POSITIVE
SODIUM SERPL-SCNC: 138 MMOL/L (ref 135–147)
SPECIMEN EXPIRATION DATE: NORMAL
T WAVE AXIS: 108 DEGREES
VARIANT LYMPHS # BLD AUTO: 1 %
VENTRICULAR RATE: 62 BPM
WBC # BLD AUTO: 64.82 THOUSAND/UL (ref 4.31–10.16)

## 2024-01-01 PROCEDURE — 73130 X-RAY EXAM OF HAND: CPT

## 2024-01-01 PROCEDURE — 85730 THROMBOPLASTIN TIME PARTIAL: CPT

## 2024-01-01 PROCEDURE — 93005 ELECTROCARDIOGRAM TRACING: CPT

## 2024-01-01 PROCEDURE — 99232 SBSQ HOSP IP/OBS MODERATE 35: CPT | Performed by: FAMILY MEDICINE

## 2024-01-01 PROCEDURE — 74177 CT ABD & PELVIS W/CONTRAST: CPT

## 2024-01-01 PROCEDURE — 85027 COMPLETE CBC AUTOMATED: CPT

## 2024-01-01 PROCEDURE — 99285 EMERGENCY DEPT VISIT HI MDM: CPT | Performed by: EMERGENCY MEDICINE

## 2024-01-01 PROCEDURE — 83880 ASSAY OF NATRIURETIC PEPTIDE: CPT

## 2024-01-01 PROCEDURE — 80053 COMPREHEN METABOLIC PANEL: CPT

## 2024-01-01 PROCEDURE — 82550 ASSAY OF CK (CPK): CPT

## 2024-01-01 PROCEDURE — 87081 CULTURE SCREEN ONLY: CPT | Performed by: INTERNAL MEDICINE

## 2024-01-01 PROCEDURE — 86850 RBC ANTIBODY SCREEN: CPT

## 2024-01-01 PROCEDURE — 0241U HB NFCT DS VIR RESP RNA 4 TRGT: CPT | Performed by: INTERNAL MEDICINE

## 2024-01-01 PROCEDURE — 84484 ASSAY OF TROPONIN QUANT: CPT

## 2024-01-01 PROCEDURE — 99239 HOSP IP/OBS DSCHRG MGMT >30: CPT | Performed by: INTERNAL MEDICINE

## 2024-01-01 PROCEDURE — 93010 ELECTROCARDIOGRAM REPORT: CPT | Performed by: INTERNAL MEDICINE

## 2024-01-01 PROCEDURE — 96374 THER/PROPH/DIAG INJ IV PUSH: CPT

## 2024-01-01 PROCEDURE — G0299 HHS/HOSPICE OF RN EA 15 MIN: HCPCS

## 2024-01-01 PROCEDURE — 99239 HOSP IP/OBS DSCHRG MGMT >30: CPT | Performed by: PHYSICIAN ASSISTANT

## 2024-01-01 PROCEDURE — 99223 1ST HOSP IP/OBS HIGH 75: CPT | Performed by: FAMILY MEDICINE

## 2024-01-01 PROCEDURE — 85007 BL SMEAR W/DIFF WBC COUNT: CPT

## 2024-01-01 PROCEDURE — 99232 SBSQ HOSP IP/OBS MODERATE 35: CPT | Performed by: INTERNAL MEDICINE

## 2024-01-01 PROCEDURE — 99285 EMERGENCY DEPT VISIT HI MDM: CPT

## 2024-01-01 PROCEDURE — 0T9B70Z DRAINAGE OF BLADDER WITH DRAINAGE DEVICE, VIA NATURAL OR ARTIFICIAL OPENING: ICD-10-PCS

## 2024-01-01 PROCEDURE — 70450 CT HEAD/BRAIN W/O DYE: CPT

## 2024-01-01 PROCEDURE — 71260 CT THORAX DX C+: CPT

## 2024-01-01 PROCEDURE — 85610 PROTHROMBIN TIME: CPT

## 2024-01-01 PROCEDURE — 86900 BLOOD TYPING SEROLOGIC ABO: CPT

## 2024-01-01 PROCEDURE — 72125 CT NECK SPINE W/O DYE: CPT

## 2024-01-01 PROCEDURE — 36415 COLL VENOUS BLD VENIPUNCTURE: CPT

## 2024-01-01 PROCEDURE — 86901 BLOOD TYPING SEROLOGIC RH(D): CPT

## 2024-01-01 RX ORDER — LORAZEPAM 0.5 MG/1
0.5 TABLET ORAL EVERY 6 HOURS PRN
Status: DISCONTINUED | OUTPATIENT
Start: 2024-01-01 | End: 2024-01-01

## 2024-01-01 RX ORDER — SENNOSIDES 8.6 MG
8.6 TABLET ORAL DAILY
Status: DISCONTINUED | OUTPATIENT
Start: 2024-01-01 | End: 2024-01-01

## 2024-01-01 RX ORDER — OXYCODONE HYDROCHLORIDE 5 MG/1
5 TABLET ORAL EVERY 4 HOURS PRN
Status: DISCONTINUED | OUTPATIENT
Start: 2024-01-01 | End: 2024-01-01

## 2024-01-01 RX ORDER — ZINC OXIDE 216 MG/ML
1 LOTION TOPICAL 2 TIMES DAILY
Start: 2024-01-01 | End: 2024-06-27

## 2024-01-01 RX ORDER — GUAIFENESIN 600 MG/1
600 TABLET, EXTENDED RELEASE ORAL EVERY 12 HOURS SCHEDULED
Status: DISCONTINUED | OUTPATIENT
Start: 2024-01-01 | End: 2024-01-01

## 2024-01-01 RX ORDER — DOXAZOSIN MESYLATE 1 MG/1
1 TABLET ORAL
Status: DISCONTINUED | OUTPATIENT
Start: 2024-01-01 | End: 2024-01-01

## 2024-01-01 RX ORDER — MORPHINE SULFATE 100 MG/5ML
5 SOLUTION, CONCENTRATE ORAL
Status: DISCONTINUED | OUTPATIENT
Start: 2024-01-01 | End: 2024-01-01

## 2024-01-01 RX ORDER — SENNOSIDES 8.6 MG
8.6 TABLET ORAL DAILY
Start: 2024-01-01 | End: 2024-06-27

## 2024-01-01 RX ORDER — FUROSEMIDE 10 MG/ML
40 INJECTION INTRAMUSCULAR; INTRAVENOUS ONCE
Status: COMPLETED | OUTPATIENT
Start: 2024-01-01 | End: 2024-01-01

## 2024-01-01 RX ORDER — ONDANSETRON 2 MG/ML
4 INJECTION INTRAMUSCULAR; INTRAVENOUS EVERY 4 HOURS PRN
Status: DISCONTINUED | OUTPATIENT
Start: 2024-01-01 | End: 2024-06-27 | Stop reason: HOSPADM

## 2024-01-01 RX ORDER — SCOLOPAMINE TRANSDERMAL SYSTEM 1 MG/1
1 PATCH, EXTENDED RELEASE TRANSDERMAL
Status: DISCONTINUED | OUTPATIENT
Start: 2024-01-01 | End: 2024-06-27 | Stop reason: HOSPADM

## 2024-01-01 RX ORDER — LORAZEPAM 2 MG/ML
0.5 INJECTION INTRAMUSCULAR EVERY 2 HOUR PRN
Status: DISCONTINUED | OUTPATIENT
Start: 2024-01-01 | End: 2024-06-27 | Stop reason: HOSPADM

## 2024-01-01 RX ORDER — PROCHLORPERAZINE MALEATE 10 MG
10 TABLET ORAL EVERY 6 HOURS PRN
Status: DISCONTINUED | OUTPATIENT
Start: 2024-01-01 | End: 2024-01-01

## 2024-01-01 RX ORDER — BISACODYL 10 MG
10 SUPPOSITORY, RECTAL RECTAL DAILY PRN
Status: DISCONTINUED | OUTPATIENT
Start: 2024-01-01 | End: 2024-06-27 | Stop reason: HOSPADM

## 2024-01-01 RX ORDER — HALOPERIDOL 2 MG/ML
1 SOLUTION ORAL EVERY 6 HOURS PRN
Status: DISCONTINUED | OUTPATIENT
Start: 2024-01-01 | End: 2024-01-01

## 2024-01-01 RX ORDER — GUAIFENESIN 600 MG/1
600 TABLET, EXTENDED RELEASE ORAL EVERY 12 HOURS PRN
Start: 2024-01-01 | End: 2024-06-27

## 2024-01-01 RX ADMIN — IOHEXOL 100 ML: 350 INJECTION, SOLUTION INTRAVENOUS at 13:05

## 2024-01-01 RX ADMIN — SENNOSIDES 8.6 MG: 8.6 TABLET, FILM COATED ORAL at 16:46

## 2024-01-01 RX ADMIN — MORPHINE SULFATE 5 MG: 100 SOLUTION ORAL at 06:36

## 2024-01-01 RX ADMIN — SCOPALAMINE 1 PATCH: 1 PATCH, EXTENDED RELEASE TRANSDERMAL at 20:17

## 2024-01-01 RX ADMIN — GUAIFENESIN 600 MG: 600 TABLET, EXTENDED RELEASE ORAL at 21:06

## 2024-01-01 RX ADMIN — GUAIFENESIN 600 MG: 600 TABLET, EXTENDED RELEASE ORAL at 21:00

## 2024-01-01 RX ADMIN — DOXAZOSIN 1 MG: 1 TABLET ORAL at 21:01

## 2024-01-01 RX ADMIN — GUAIFENESIN 600 MG: 600 TABLET, EXTENDED RELEASE ORAL at 08:24

## 2024-01-01 RX ADMIN — GUAIFENESIN 600 MG: 600 TABLET, EXTENDED RELEASE ORAL at 08:23

## 2024-01-01 RX ADMIN — DOXAZOSIN 1 MG: 1 TABLET ORAL at 21:00

## 2024-01-01 RX ADMIN — GUAIFENESIN 600 MG: 600 TABLET, EXTENDED RELEASE ORAL at 08:56

## 2024-01-01 RX ADMIN — MORPHINE SULFATE 5 MG: 100 SOLUTION ORAL at 22:46

## 2024-01-01 RX ADMIN — SENNOSIDES 8.6 MG: 8.6 TABLET, FILM COATED ORAL at 08:23

## 2024-01-01 RX ADMIN — FUROSEMIDE 40 MG: 10 INJECTION, SOLUTION INTRAMUSCULAR; INTRAVENOUS at 14:42

## 2024-01-01 RX ADMIN — MORPHINE SULFATE 2 MG: 2 INJECTION, SOLUTION INTRAMUSCULAR; INTRAVENOUS at 20:17

## 2024-01-01 RX ADMIN — SENNOSIDES 8.6 MG: 8.6 TABLET, FILM COATED ORAL at 08:24

## 2024-01-01 RX ADMIN — GUAIFENESIN 600 MG: 600 TABLET, EXTENDED RELEASE ORAL at 21:01

## 2024-01-01 RX ADMIN — SENNOSIDES 8.6 MG: 8.6 TABLET, FILM COATED ORAL at 08:56

## 2024-01-03 ENCOUNTER — HOME CARE VISIT (OUTPATIENT)
Dept: HOME HEALTH SERVICES | Facility: HOME HEALTHCARE | Age: 89
End: 2024-01-03
Payer: MEDICARE

## 2024-01-03 PROCEDURE — G0299 HHS/HOSPICE OF RN EA 15 MIN: HCPCS

## 2024-01-04 VITALS
RESPIRATION RATE: 18 BRPM | DIASTOLIC BLOOD PRESSURE: 68 MMHG | HEART RATE: 76 BPM | TEMPERATURE: 96.4 F | SYSTOLIC BLOOD PRESSURE: 126 MMHG

## 2024-01-09 ENCOUNTER — HOME CARE VISIT (OUTPATIENT)
Dept: HOME HEALTH SERVICES | Facility: HOME HEALTHCARE | Age: 89
End: 2024-01-09
Payer: MEDICARE

## 2024-01-09 VITALS — RESPIRATION RATE: 18 BRPM | SYSTOLIC BLOOD PRESSURE: 118 MMHG | DIASTOLIC BLOOD PRESSURE: 62 MMHG | HEART RATE: 60 BPM

## 2024-01-09 PROCEDURE — G0299 HHS/HOSPICE OF RN EA 15 MIN: HCPCS

## 2024-01-15 ENCOUNTER — REMOTE DEVICE CLINIC VISIT (OUTPATIENT)
Dept: CARDIOLOGY CLINIC | Facility: CLINIC | Age: 89
End: 2024-01-15

## 2024-01-15 DIAGNOSIS — Z95.0 PRESENCE OF PERMANENT CARDIAC PACEMAKER: Primary | ICD-10-CM

## 2024-01-15 PROCEDURE — RECHECK: Performed by: INTERNAL MEDICINE

## 2024-01-15 NOTE — PROGRESS NOTES
SJM DC PM (VVIR 60)/ NOT MRI CONDITIONAL   NB MERLIN TRANSMISSION:  BATTERY VOLTAGE NEARING EDILMA (6.2 MONTHS).  WILL SCHEDULE MONTHLY BATTERY CHECKS.  >99% (>40%/CHB/VVIR 60 PPM).  ALL AVAILABLE LEAD PARAMETERS WITHIN NORMAL LIMITS.  NO SIGNIFICANT HIGH RATE EPISODES.  NORMAL DEVICE FUNCTION.  RG

## 2024-01-16 ENCOUNTER — HOME CARE VISIT (OUTPATIENT)
Dept: HOME HEALTH SERVICES | Facility: HOME HEALTHCARE | Age: 89
End: 2024-01-16
Payer: MEDICARE

## 2024-01-16 ENCOUNTER — TELEPHONE (OUTPATIENT)
Dept: OTHER | Facility: HOSPITAL | Age: 89
End: 2024-01-16

## 2024-01-16 VITALS
RESPIRATION RATE: 18 BRPM | SYSTOLIC BLOOD PRESSURE: 126 MMHG | TEMPERATURE: 97.1 F | HEART RATE: 68 BPM | DIASTOLIC BLOOD PRESSURE: 60 MMHG

## 2024-01-16 PROCEDURE — G0299 HHS/HOSPICE OF RN EA 15 MIN: HCPCS

## 2024-01-16 NOTE — TELEPHONE ENCOUNTER
Patient has not been seen in a year can we call and see if you would like to follow-up with pulmonology

## 2024-01-23 ENCOUNTER — HOME CARE VISIT (OUTPATIENT)
Dept: HOME HEALTH SERVICES | Facility: HOME HEALTHCARE | Age: 89
End: 2024-01-23
Payer: MEDICARE

## 2024-01-23 VITALS
TEMPERATURE: 97.8 F | DIASTOLIC BLOOD PRESSURE: 70 MMHG | SYSTOLIC BLOOD PRESSURE: 122 MMHG | HEART RATE: 76 BPM | RESPIRATION RATE: 20 BRPM

## 2024-01-23 PROCEDURE — G0299 HHS/HOSPICE OF RN EA 15 MIN: HCPCS

## 2024-01-30 ENCOUNTER — HOME CARE VISIT (OUTPATIENT)
Dept: HOME HEALTH SERVICES | Facility: HOME HEALTHCARE | Age: 89
End: 2024-01-30
Payer: MEDICARE

## 2024-01-30 VITALS
TEMPERATURE: 97.5 F | DIASTOLIC BLOOD PRESSURE: 70 MMHG | SYSTOLIC BLOOD PRESSURE: 130 MMHG | RESPIRATION RATE: 18 BRPM | HEART RATE: 64 BPM

## 2024-01-30 PROCEDURE — G0299 HHS/HOSPICE OF RN EA 15 MIN: HCPCS

## 2024-02-07 ENCOUNTER — HOME CARE VISIT (OUTPATIENT)
Dept: HOME HEALTH SERVICES | Facility: HOME HEALTHCARE | Age: 89
End: 2024-02-07
Payer: MEDICARE

## 2024-02-07 VITALS
HEART RATE: 64 BPM | DIASTOLIC BLOOD PRESSURE: 56 MMHG | TEMPERATURE: 98 F | SYSTOLIC BLOOD PRESSURE: 112 MMHG | RESPIRATION RATE: 18 BRPM

## 2024-02-07 PROCEDURE — G0299 HHS/HOSPICE OF RN EA 15 MIN: HCPCS

## 2024-02-08 ENCOUNTER — HOME CARE VISIT (OUTPATIENT)
Dept: HOME HOSPICE | Facility: HOSPICE | Age: 89
End: 2024-02-08
Payer: MEDICARE

## 2024-02-15 ENCOUNTER — HOME CARE VISIT (OUTPATIENT)
Dept: HOME HEALTH SERVICES | Facility: HOME HEALTHCARE | Age: 89
End: 2024-02-15
Payer: MEDICARE

## 2024-02-15 ENCOUNTER — REMOTE DEVICE CLINIC VISIT (OUTPATIENT)
Dept: CARDIOLOGY CLINIC | Facility: CLINIC | Age: 89
End: 2024-02-15

## 2024-02-15 VITALS
HEART RATE: 72 BPM | RESPIRATION RATE: 16 BRPM | DIASTOLIC BLOOD PRESSURE: 70 MMHG | SYSTOLIC BLOOD PRESSURE: 124 MMHG | TEMPERATURE: 98.5 F

## 2024-02-15 DIAGNOSIS — Z95.0 PRESENCE OF CARDIAC PACEMAKER: Primary | ICD-10-CM

## 2024-02-15 PROCEDURE — G0299 HHS/HOSPICE OF RN EA 15 MIN: HCPCS

## 2024-02-15 PROCEDURE — RECHECK: Performed by: INTERNAL MEDICINE

## 2024-02-15 NOTE — PROGRESS NOTES
Results for orders placed or performed in visit on 02/15/24   Cardiac EP device report    Narrative    SJM DC PM (VVIR 60)/ NOT MRI CONDITIONAL  NON-BILLABLE MERLIN TRANSMISSION: BATTERY VOLTAGE NEARING EDILMA (5.1 MOS).  WILL SCHEDULE MONTHLY BATTERY CHECKS. : >99%. (>40%~CHB). ALL AVAILABLE LEAD PARAMETERS WITHIN NORMAL LIMITS. NO SIGNIFICANT HIGH RATE EPISODES. NORMAL DEVICE FUNCTION. CH

## 2024-02-20 ENCOUNTER — HOME CARE VISIT (OUTPATIENT)
Dept: HOME HEALTH SERVICES | Facility: HOME HEALTHCARE | Age: 89
End: 2024-02-20
Payer: MEDICARE

## 2024-02-20 PROCEDURE — G0299 HHS/HOSPICE OF RN EA 15 MIN: HCPCS

## 2024-02-21 VITALS
RESPIRATION RATE: 16 BRPM | HEART RATE: 60 BPM | TEMPERATURE: 99.2 F | SYSTOLIC BLOOD PRESSURE: 118 MMHG | DIASTOLIC BLOOD PRESSURE: 64 MMHG

## 2024-02-21 PROBLEM — Z00.00 MEDICARE ANNUAL WELLNESS VISIT, SUBSEQUENT: Status: RESOLVED | Noted: 2019-03-28 | Resolved: 2024-02-21

## 2024-02-27 ENCOUNTER — HOME CARE VISIT (OUTPATIENT)
Dept: HOME HEALTH SERVICES | Facility: HOME HEALTHCARE | Age: 89
End: 2024-02-27
Payer: MEDICARE

## 2024-02-27 VITALS
HEART RATE: 60 BPM | TEMPERATURE: 99 F | DIASTOLIC BLOOD PRESSURE: 58 MMHG | RESPIRATION RATE: 18 BRPM | SYSTOLIC BLOOD PRESSURE: 136 MMHG

## 2024-02-27 PROCEDURE — G0299 HHS/HOSPICE OF RN EA 15 MIN: HCPCS

## 2024-03-05 ENCOUNTER — HOME CARE VISIT (OUTPATIENT)
Dept: HOME HEALTH SERVICES | Facility: HOME HEALTHCARE | Age: 89
End: 2024-03-05
Payer: MEDICARE

## 2024-03-05 VITALS
DIASTOLIC BLOOD PRESSURE: 58 MMHG | HEART RATE: 64 BPM | RESPIRATION RATE: 16 BRPM | SYSTOLIC BLOOD PRESSURE: 120 MMHG | TEMPERATURE: 99 F

## 2024-03-05 PROCEDURE — G0299 HHS/HOSPICE OF RN EA 15 MIN: HCPCS

## 2024-03-12 ENCOUNTER — HOME CARE VISIT (OUTPATIENT)
Dept: HOME HEALTH SERVICES | Facility: HOME HEALTHCARE | Age: 89
End: 2024-03-12
Payer: MEDICARE

## 2024-03-12 PROCEDURE — G0299 HHS/HOSPICE OF RN EA 15 MIN: HCPCS

## 2024-03-13 VITALS
RESPIRATION RATE: 20 BRPM | SYSTOLIC BLOOD PRESSURE: 120 MMHG | DIASTOLIC BLOOD PRESSURE: 60 MMHG | HEART RATE: 72 BPM | TEMPERATURE: 98 F

## 2024-03-15 ENCOUNTER — REMOTE DEVICE CLINIC VISIT (OUTPATIENT)
Dept: CARDIOLOGY CLINIC | Facility: CLINIC | Age: 89
End: 2024-03-15
Payer: MEDICARE

## 2024-03-15 DIAGNOSIS — Z95.0 PRESENCE OF CARDIAC PACEMAKER: Primary | ICD-10-CM

## 2024-03-15 PROCEDURE — 93296 REM INTERROG EVL PM/IDS: CPT | Performed by: INTERNAL MEDICINE

## 2024-03-15 PROCEDURE — 93294 REM INTERROG EVL PM/LDLS PM: CPT | Performed by: INTERNAL MEDICINE

## 2024-03-15 NOTE — PROGRESS NOTES
Results for orders placed or performed in visit on 03/15/24   Cardiac EP device report    Narrative    SJM DC PM (VVIR 60)/ NOT MRI CONDITIONAL  MERLIN TRANSMISSION: BATTERY VOLTAGE NEARING EDILMA (4.1 MOS). WILL SCHEDULE MONTHLY BATTERY CHECKS.  >99% (CHB/VVIR 60PPM);  ALL AVAILABLE LEAD PARAMETERS WITHIN NORMAL LIMITS. NO SIGNIFICANT HIGH RATE EPISODES. NORMAL DEVICE FUNCTION.    ES

## 2024-03-20 ENCOUNTER — HOME CARE VISIT (OUTPATIENT)
Dept: HOME HEALTH SERVICES | Facility: HOME HEALTHCARE | Age: 89
End: 2024-03-20
Payer: MEDICARE

## 2024-03-20 VITALS
SYSTOLIC BLOOD PRESSURE: 130 MMHG | TEMPERATURE: 98.9 F | DIASTOLIC BLOOD PRESSURE: 62 MMHG | RESPIRATION RATE: 20 BRPM | HEART RATE: 76 BPM

## 2024-03-20 PROCEDURE — G0299 HHS/HOSPICE OF RN EA 15 MIN: HCPCS

## 2024-03-26 ENCOUNTER — HOME CARE VISIT (OUTPATIENT)
Dept: HOME HEALTH SERVICES | Facility: HOME HEALTHCARE | Age: 89
End: 2024-03-26
Payer: MEDICARE

## 2024-03-26 VITALS
TEMPERATURE: 98 F | RESPIRATION RATE: 16 BRPM | HEART RATE: 68 BPM | DIASTOLIC BLOOD PRESSURE: 70 MMHG | SYSTOLIC BLOOD PRESSURE: 116 MMHG

## 2024-03-26 PROCEDURE — G0299 HHS/HOSPICE OF RN EA 15 MIN: HCPCS

## 2024-04-02 ENCOUNTER — HOME CARE VISIT (OUTPATIENT)
Dept: HOME HEALTH SERVICES | Facility: HOME HEALTHCARE | Age: 89
End: 2024-04-02
Payer: MEDICARE

## 2024-04-02 VITALS
TEMPERATURE: 98.1 F | RESPIRATION RATE: 18 BRPM | SYSTOLIC BLOOD PRESSURE: 118 MMHG | HEART RATE: 72 BPM | DIASTOLIC BLOOD PRESSURE: 56 MMHG

## 2024-04-02 PROCEDURE — G0299 HHS/HOSPICE OF RN EA 15 MIN: HCPCS

## 2024-04-08 ENCOUNTER — HOME CARE VISIT (OUTPATIENT)
Dept: HOME HEALTH SERVICES | Facility: HOME HEALTHCARE | Age: 89
End: 2024-04-08
Payer: MEDICARE

## 2024-04-08 PROCEDURE — G0299 HHS/HOSPICE OF RN EA 15 MIN: HCPCS

## 2024-04-10 VITALS
BODY MASS INDEX: 18.75 KG/M2 | TEMPERATURE: 97.6 F | HEART RATE: 64 BPM | RESPIRATION RATE: 18 BRPM | WEIGHT: 146 LBS | DIASTOLIC BLOOD PRESSURE: 60 MMHG | SYSTOLIC BLOOD PRESSURE: 118 MMHG

## 2024-04-16 ENCOUNTER — REMOTE DEVICE CLINIC VISIT (OUTPATIENT)
Dept: CARDIOLOGY CLINIC | Facility: CLINIC | Age: 89
End: 2024-04-16

## 2024-04-16 ENCOUNTER — HOME CARE VISIT (OUTPATIENT)
Dept: HOME HEALTH SERVICES | Facility: HOME HEALTHCARE | Age: 89
End: 2024-04-16
Payer: MEDICARE

## 2024-04-16 DIAGNOSIS — Z95.0 CARDIAC PACEMAKER IN SITU: Primary | ICD-10-CM

## 2024-04-16 PROCEDURE — RECHECK: Performed by: INTERNAL MEDICINE

## 2024-04-16 PROCEDURE — G0299 HHS/HOSPICE OF RN EA 15 MIN: HCPCS

## 2024-04-16 NOTE — PROGRESS NOTES
Results for orders placed or performed in visit on 04/16/24   Cardiac EP device report    Narrative    SJM DC PM (VVIR 60)/ NOT MRI CONDITIONAL  MERLIN TRANSMISSION TO CHECK BATTERY STATUS- NB: BATTERY VOLTAGE NEARING EDILMA (3.3 MOS). WILL SCHEDULE MONTHLY BATTERY CHECKS. >99% (>40% CHB/VVIR@60PPM). ALL AVAILABLE LEAD PARAMETERS WITHIN NORMAL LIMITS. NO SIGNIFICANT HIGH RATE EPISODES. NORMAL DEVICE FUNCTION. GV

## 2024-04-17 VITALS — HEART RATE: 60 BPM | RESPIRATION RATE: 18 BRPM | DIASTOLIC BLOOD PRESSURE: 60 MMHG | SYSTOLIC BLOOD PRESSURE: 122 MMHG

## 2024-04-24 ENCOUNTER — HOME CARE VISIT (OUTPATIENT)
Dept: HOME HEALTH SERVICES | Facility: HOME HEALTHCARE | Age: 89
End: 2024-04-24
Payer: MEDICARE

## 2024-04-24 PROCEDURE — G0299 HHS/HOSPICE OF RN EA 15 MIN: HCPCS

## 2024-04-25 VITALS
DIASTOLIC BLOOD PRESSURE: 56 MMHG | HEART RATE: 80 BPM | TEMPERATURE: 97.6 F | SYSTOLIC BLOOD PRESSURE: 114 MMHG | RESPIRATION RATE: 16 BRPM

## 2024-04-30 ENCOUNTER — HOME CARE VISIT (OUTPATIENT)
Dept: HOME HEALTH SERVICES | Facility: HOME HEALTHCARE | Age: 89
End: 2024-04-30
Payer: MEDICARE

## 2024-04-30 PROCEDURE — G0299 HHS/HOSPICE OF RN EA 15 MIN: HCPCS

## 2024-05-06 VITALS
BODY MASS INDEX: 18.75 KG/M2 | SYSTOLIC BLOOD PRESSURE: 124 MMHG | RESPIRATION RATE: 18 BRPM | WEIGHT: 146 LBS | DIASTOLIC BLOOD PRESSURE: 64 MMHG | HEART RATE: 78 BPM

## 2024-05-07 ENCOUNTER — HOME CARE VISIT (OUTPATIENT)
Dept: HOME HEALTH SERVICES | Facility: HOME HEALTHCARE | Age: 89
End: 2024-05-07
Payer: MEDICARE

## 2024-05-07 VITALS
SYSTOLIC BLOOD PRESSURE: 118 MMHG | HEART RATE: 64 BPM | RESPIRATION RATE: 16 BRPM | DIASTOLIC BLOOD PRESSURE: 64 MMHG | TEMPERATURE: 96.6 F

## 2024-05-07 PROCEDURE — G0299 HHS/HOSPICE OF RN EA 15 MIN: HCPCS

## 2024-05-14 ENCOUNTER — HOME CARE VISIT (OUTPATIENT)
Dept: HOME HEALTH SERVICES | Facility: HOME HEALTHCARE | Age: 89
End: 2024-05-14
Payer: MEDICARE

## 2024-05-14 VITALS
SYSTOLIC BLOOD PRESSURE: 118 MMHG | DIASTOLIC BLOOD PRESSURE: 70 MMHG | TEMPERATURE: 97.3 F | RESPIRATION RATE: 18 BRPM | HEART RATE: 72 BPM

## 2024-05-14 PROCEDURE — G0299 HHS/HOSPICE OF RN EA 15 MIN: HCPCS

## 2024-05-17 ENCOUNTER — REMOTE DEVICE CLINIC VISIT (OUTPATIENT)
Dept: CARDIOLOGY CLINIC | Facility: CLINIC | Age: 89
End: 2024-05-17

## 2024-05-17 DIAGNOSIS — Z95.0 CARDIAC PACEMAKER IN SITU: Primary | ICD-10-CM

## 2024-05-17 PROCEDURE — RECHECK: Performed by: STUDENT IN AN ORGANIZED HEALTH CARE EDUCATION/TRAINING PROGRAM

## 2024-05-21 ENCOUNTER — HOME CARE VISIT (OUTPATIENT)
Dept: HOME HEALTH SERVICES | Facility: HOME HEALTHCARE | Age: 89
End: 2024-05-21
Payer: MEDICARE

## 2024-05-21 VITALS
DIASTOLIC BLOOD PRESSURE: 60 MMHG | HEART RATE: 60 BPM | TEMPERATURE: 98.4 F | RESPIRATION RATE: 18 BRPM | SYSTOLIC BLOOD PRESSURE: 122 MMHG

## 2024-05-21 PROCEDURE — G0299 HHS/HOSPICE OF RN EA 15 MIN: HCPCS

## 2024-05-28 ENCOUNTER — HOME CARE VISIT (OUTPATIENT)
Dept: HOME HEALTH SERVICES | Facility: HOME HEALTHCARE | Age: 89
End: 2024-05-28
Payer: MEDICARE

## 2024-05-28 VITALS — HEART RATE: 72 BPM | DIASTOLIC BLOOD PRESSURE: 60 MMHG | RESPIRATION RATE: 18 BRPM | SYSTOLIC BLOOD PRESSURE: 120 MMHG

## 2024-05-28 PROCEDURE — G0299 HHS/HOSPICE OF RN EA 15 MIN: HCPCS

## 2024-06-04 ENCOUNTER — HOME CARE VISIT (OUTPATIENT)
Dept: HOME HEALTH SERVICES | Facility: HOME HEALTHCARE | Age: 89
End: 2024-06-04
Payer: MEDICARE

## 2024-06-04 VITALS
TEMPERATURE: 97 F | DIASTOLIC BLOOD PRESSURE: 60 MMHG | HEART RATE: 68 BPM | SYSTOLIC BLOOD PRESSURE: 120 MMHG | RESPIRATION RATE: 18 BRPM

## 2024-06-04 PROCEDURE — G0299 HHS/HOSPICE OF RN EA 15 MIN: HCPCS

## 2024-06-11 ENCOUNTER — HOME CARE VISIT (OUTPATIENT)
Dept: HOME HEALTH SERVICES | Facility: HOME HEALTHCARE | Age: 89
End: 2024-06-11
Payer: MEDICARE

## 2024-06-11 PROCEDURE — G0299 HHS/HOSPICE OF RN EA 15 MIN: HCPCS

## 2024-06-12 VITALS
DIASTOLIC BLOOD PRESSURE: 60 MMHG | RESPIRATION RATE: 16 BRPM | HEART RATE: 80 BPM | TEMPERATURE: 97.4 F | SYSTOLIC BLOOD PRESSURE: 118 MMHG

## 2024-06-17 ENCOUNTER — REMOTE DEVICE CLINIC VISIT (OUTPATIENT)
Dept: CARDIOLOGY CLINIC | Facility: CLINIC | Age: 89
End: 2024-06-17

## 2024-06-17 DIAGNOSIS — Z95.0 CARDIAC PACEMAKER IN SITU: Primary | ICD-10-CM

## 2024-06-17 PROCEDURE — RECHECK: Performed by: STUDENT IN AN ORGANIZED HEALTH CARE EDUCATION/TRAINING PROGRAM

## 2024-06-17 NOTE — PROGRESS NOTES
Results for orders placed or performed in visit on 06/17/24   Cardiac EP device report    Narrative    S[facility] DC PM (VVIR 60)/ NOT MRI CONDITIONAL  [facility]LIN TRANSMISSION: BATTERY VOLTAGE NEARING EDILMA <3 MONS. WILL SCHEDULE MONTHLY BATTERY CHECKS.  98%. ALL AVAILABLE LEAD PARAMETERS WITHIN NORMAL LIMITS. NO SIGNIFICANT HIGH RATE EPISODES. NORMAL DEVICE FUNCTION. NC

## 2024-06-22 ENCOUNTER — HOME CARE VISIT (OUTPATIENT)
Dept: HOME HOSPICE | Facility: HOSPICE | Age: 89
End: 2024-06-22
Payer: MEDICARE

## 2024-06-22 PROBLEM — I77.89 ECTASIA OF ARTERY (HCC): Status: ACTIVE | Noted: 2024-01-01

## 2024-06-22 PROBLEM — Z51.5 COMFORT MEASURES ONLY STATUS: Status: ACTIVE | Noted: 2024-01-01

## 2024-06-22 PROBLEM — R41.81 AGE-RELATED COGNITIVE DECLINE: Status: ACTIVE | Noted: 2024-01-01

## 2024-06-22 PROBLEM — S06.5XAA SUBDURAL HEMATOMA (HCC): Status: ACTIVE | Noted: 2024-01-01

## 2024-06-22 PROBLEM — R09.02 HYPOXIA: Status: ACTIVE | Noted: 2024-01-01

## 2024-06-22 PROBLEM — R33.9 URINARY RETENTION: Status: ACTIVE | Noted: 2024-06-22

## 2024-06-22 PROBLEM — K22.89 ESOPHAGEAL THICKENING: Status: ACTIVE | Noted: 2024-01-01

## 2024-06-22 PROBLEM — D72.829 LEUKOCYTOSIS: Status: ACTIVE | Noted: 2024-01-01

## 2024-06-22 PROBLEM — S12.600A CLOSED FRACTURE OF SEVENTH CERVICAL VERTEBRA (HCC): Status: ACTIVE | Noted: 2024-06-22

## 2024-06-22 PROBLEM — R41.89 COGNITIVE DECLINE: Status: ACTIVE | Noted: 2024-06-22

## 2024-06-22 PROBLEM — E43 SEVERE MALNUTRITION (HCC): Status: ACTIVE | Noted: 2024-06-22

## 2024-06-22 NOTE — H&P
[facility]  H&P  Name: Greyson Cisneros 89 y.o. male I MRN: [id number]  Unit/Bed#: 401-01 I Date of Admission: 6/22/2024   Date of Service: 6/22/2024 I Hospital Day: 0      Assessment & Plan   * Comfort measures only status  Assessment & Plan  This is a 89-year-old male patient who already on hospice care at home, history of pulmonary fibrosis, CHF, BPH, pulmonary hypertension who presents status post what appears to be mechanical fall at home.  Per family the patient has been noted for decline over the past week, previously only had hospice visits weekly    Following the fall patient was found to have subdural hematoma, C7 fracture and other findings as below    The patient will require increased supports/24-hour care upon discharge.  Family is hoping that they would be able to find staffing to support patient's needs at home  Admitted under comfort care measures  Continue symptom management approach with pain medications including oxycodone, Ativan as needed and other supportive measures  Patient also noted for urinary retention for which a Peña catheter was placed  Case management input appreciated for discharge planning    Severe malnutrition (HCC)  Assessment & Plan  Malnutrition Findings:                                 BMI Findings:           Body mass index is 15.97 kg/m².   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 Recommend follow-up in 12 months.   Findings noted, however limited clinical significance given that the patient is on hospice care    Closed fracture of seventh cervical vertebra  "(HCC)  Assessment & Plan  Following mechanical fall  CT scanning revealing \"hyperextension injury at C6-C7, with fracture of C7 anterior osteophyte, and anterior widening of C6-C7 disc space, as above. This is suggestive of anterior longitudinal ligament injury. Acute nondisplaced fracture of the spinous process of C6.\"  Supportive management, comfort care with plan for hospice placement    Hypoxia  Assessment & Plan  Multifactorial with history of pulmonary fibrosis, pulmonary hypertension, kyphosis  Continue oxygen for comfort    Subdural hematoma (HCC)  Assessment & Plan  S/p mechanical fall  CT head: \"Left supratentorial acute subdural hematoma, up to 4 mm. No significant mass effect. No other foci of intracranial hemorrhage.  Admitted under comfort care, no AC/DVT prophylaxis    Esophageal thickening  Assessment & Plan  New finding on CT  Does not report GI symptoms  Comfort care pleasure feeds    Urinary retention  Assessment & Plan  With history of BPH  Peña catheter placed in ED    Acute diastolic heart failure (HCC)  Assessment & Plan  Wt Readings from Last 3 Encounters:   06/22/24 59.5 kg (131 lb 3.2 oz)   04/30/24 66.2 kg (146 lb)   04/08/24 66.2 kg (146 lb)     Patient appears volume depleted on exam, despite \"Bilateral pleural effusions, small, with some fissural entrapment\" noted on CT  Admitted for comfort care  Hold home Lasix  Monitor respiratory symptoms          Constipation  Assessment & Plan  Bowel regimen    Presence of permanent cardiac pacemaker  Assessment & Plan  Noted    Permanent atrial fibrillation (HCC)  Assessment & Plan  Not on AV node blocking medications or anticoagulation  Admitted under comfort care    Benign prostatic hyperplasia  Assessment & Plan  Patient presented with urinary retention, Peña catheter placed  Continue Cardura         VTE Pharmacologic Prophylaxis: VTE Score: 3 Moderate Risk (Score 3-4) - Pharmacological DVT Prophylaxis Contraindicated. Sequential Compression " Devices Ordered.  Code Status: Level 4 - Comfort Care   Discussion with family: Updated  (daughters Krysten and Bridgette) via phone.    Anticipated Length of Stay: Patient will be admitted on an inpatient basis with an anticipated length of stay of greater than 2 midnights secondary to requiring 24 hr support, safe discharge planning, close monitoring.    Total Time Spent on Date of Encounter in care of patient: 75 mins. This time was spent on one or more of the following: performing physical exam; counseling and coordination of care; obtaining or reviewing history; documenting in the medical record; reviewing/ordering tests, medications or procedures; communicating with other healthcare professionals and discussing with patient's family/caregivers.    Chief Complaint: fall, C7 fracture, SDH    History of Present Illness:  Greyson Cisneros is a 89 y.o. male with a PMH of CHF, BPH, on home hospice who presents following a fall this morning sustaining a C7 fracture, subdural hematoma, and multiple bruises. Patient is a limited historian due to declining memory. Per daughters, patient has been declining for the past week and they are concerned regarding his inability to continue living by himself at home. The daughters would like to pursue admission to the hospital under comfort care measures with plan for discharged on hospice status.    Review of Systems:  Review of Systems   Unable to perform ROS: Age       Past Medical and Surgical History:   Past Medical History:   Diagnosis Date    Arthritis     Atrial fibrillation (HCC)     Last Assessed:8/10/17    Benign prostatic hyperplasia     Last Assessed:1/28/14    BPH (benign prostatic hyperplasia)     Complete atrioventricular block (HCC)     Diverticulosis     Last Assessed:4/30/13    Hypercholesterolemia     Hypertension     Hyponatremia 04/10/2022    Paroxysmal ventricular tachycardia (HCC)     Last Assessed:7/20/17    Pneumonia     Prostatitis     Last  Assessed:12/19/12    Pulmonary artery hypertension (HCC)     mild pulmonary htn    Retention, urine     Right bundle branch block     Last Assessed:11/26/13    Sepsis without acute organ dysfunction (HCC) 04/10/2022    Suicidal ideation 04/10/2022    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     Last Assessedl:10/8/14       Past Surgical History:   Procedure Laterality Date    CARDIAC PACEMAKER PLACEMENT      [facility] DC PPM    COLONOSCOPY      HEMORRHOID SURGERY      Cauterization of hemorrhoids    HERNIA REPAIR      INCISION AND DRAINAGE ABSCESS / HEMATOMA OF BURSA / KNEE / THIGH      Fatty Tumor Removed    KNEE SURGERY Left     Steel Removes from left knee       Meds/Allergies:  Prior to Admission medications    Medication Sig Start Date End Date Taking? Authorizing Provider   bisacodyl (Bisacodyl Laxative) 10 mg suppository Insert 10 mg into the rectum daily as needed for constipation. CP meds on hold  Indications: Constipation, Evacuation of Material from the Bowel    JAY Fam   clotrimazole (MYCELEX) 10 mg isabell Take 1 tablet (10 mg total) by mouth in the morning and 1 tablet (10 mg total) in the evening and 1 tablet (10 mg total) before bedtime.  Patient not taking: Reported on 10/6/2023 5/11/22   Laila Diaz DO   doxazosin (CARDURA) 4 mg tablet Take 1 mg by mouth daily at bedtime. Indications: Benign Enlargement of Prostate 11/13/23   Laila Diaz DO   furosemide (LASIX) 20 mg tablet Take 20 mg by mouth daily as needed (edema ). Indications: Cardiac Failure, Edema 11/13/23   JAY Fam   guaiFENesin (Mucinex) 600 mg 12 hr tablet Take 600 mg by mouth every 12 (twelve) hours. Indications: Cough 11/28/23   Laila Diaz DO   haloperidol (HALDOL) oral concentrated solution Take 1 mg by mouth every 6 (six) hours as needed for agitation (N/V). CP meds on hold  Indications: Abnormally Increased Activity, Problems with Behavior    JAY Fam   hyoscyamine (LEVSIN/SL) 0.125 mg  SL tablet Take 0.125 mg by mouth every 4 (four) hours as needed (SECRETIONS). CP meds on hold  Indications: SECRETIONS    JAY Fam   LORazepam (Ativan) 0.5 mg tablet Take 0.5 mg by mouth every 6 (six) hours as needed for anxiety (SOB). CP meds on hold  Indications: Feeling Anxious, SOB    JAY Fam   Morphine Sulfate, Concentrate, 20 mg/mL concentrated solution Take 5 mg by mouth every 3 (three) hours as needed for moderate pain or severe pain (SOB). CP meds on hold  Indications: Difficulty Breathing, Pain    JAY Fam   oxyCODONE (ROXICODONE) 5 immediate release tablet Take 5 mg by mouth every 6 (six) hours as needed for moderate pain or severe pain (SOB). Indications: Acute Pain, Chronic Pain, SOB 11/13/23   JAY Fam   oxygen gas Inhale 2 L/min daily as needed for dizziness (SOB). Indications: SOB 11/13/23   JAY Fam   prochlorperazine (COMPAZINE) 10 mg tablet Take 10 mg by mouth every 6 (six) hours as needed for nausea or vomiting. CP meds on hold  Indications: Nausea and Vomiting    JAY Fam   senna (SENOKOT) 8.6 mg Take 8.6 mg by mouth in the morning. Indications: Constipation, Evacuation of Material from the Bowel 11/13/23   JAY Fam   Ascorbic Acid (VITAMIN C) 1000 MG tablet Take 1 tablet by mouth daily  Patient not taking: Reported on 10/6/2023  11/13/23  Historical Provider, MD   aspirin 325 mg tablet Take 1 tablet by mouth daily  Patient not taking: Reported on 5/16/2023 11/13/23  Historical Provider, MD   cyanocobalamin (VITAMIN B-12) 100 mcg tablet Take 1 tablet by mouth daily  Patient not taking: Reported on 6/1/2023 11/13/23  Historical Provider, MD   tamsulosin (Flomax) 0.4 mg Take 0.4 mg by mouth in the morning. Indications: Benign Enlargement of Prostate, Dysfunction of the Urinary Bladder 11/13/23 12/5/23  JAY Fam     I have reviewed home medications with a medical source (PCP, Pharmacy, other).    Allergies:  "  Allergies   Allergen Reactions    Ibuprofen Hives    Red Dye - Food Allergy Hives       Social History:  Marital Status:    Occupation: retired  Patient Pre-hospital Living Situation: Home hospice  Patient Pre-hospital Level of Mobility: unable to be assessed at time of evaluation  Patient Pre-hospital Diet Restrictions: None  Substance Use History:   Social History     Substance and Sexual Activity   Alcohol Use Not Currently    Alcohol/week: 0.0 standard drinks of alcohol    Comment: social drinker     Social History     Tobacco Use   Smoking Status Former    Current packs/day: 0.00    Types: Cigarettes    Quit date:     Years since quittin.5   Smokeless Tobacco Never     Social History     Substance and Sexual Activity   Drug Use No       Family History:  Family History   Problem Relation Age of Onset    Hypertension Mother     Stroke Mother         Stroke Syndrome    Other Sister         pacemaker placement    Prostate cancer Brother     Diabetes Family        Physical Exam:     Vitals:   Blood Pressure: 127/51 (24 1537)  Pulse: 60 (24 1537)  Temperature: (!) 97.3 °F (36.3 °C) (24 153)  Temp Source: Oral (24 1537)  Respirations: 19 (24 153)  Height: 6' 4\" (193 cm) (24 1537)  Weight - Scale: 59.5 kg (131 lb 3.2 oz) (24 1537)  SpO2: 100 % (24 1558)    Physical Exam     Additional Data:     Lab Results:  Results from last 7 days   Lab Units 24  1242   WBC Thousand/uL 64.82*   HEMOGLOBIN g/dL 9.6*   HEMATOCRIT % 31.4*   PLATELETS Thousands/uL 35*   BANDS PCT % 5   LYMPHO PCT % 2*   MONO PCT % 14*   EOS PCT % 0     Results from last 7 days   Lab Units 24  1242   SODIUM mmol/L 138   POTASSIUM mmol/L 4.6   CHLORIDE mmol/L 103   CO2 mmol/L 21   BUN mg/dL 39*   CREATININE mg/dL 1.53*   ANION GAP mmol/L 14*   CALCIUM mg/dL 10.0   ALBUMIN g/dL 3.7   TOTAL BILIRUBIN mg/dL 3.77*   ALK PHOS U/L 49   ALT U/L 10   AST U/L 28   GLUCOSE RANDOM mg/dL " "117     Results from last 7 days   Lab Units 06/22/24  1242   INR  1.76*         No results found for: \"HGBA1C\"        Lines/Drains:  Invasive Devices       Peripheral Intravenous Line  Duration             Peripheral IV 06/22/24 Right Antecubital <1 day              Drain  Duration             Urethral Catheter 16 Fr. <1 day                  Urinary Catheter:  Goal for removal: N/A- Discharging with Peña             Imaging:   XR hand 3+ views RIGHT   ED Interpretation by Bg Feliz MD (06/22 7052)   X-ray independently interpreted by me.  Age-indeterminate triquetrium avulsion fracture      Final Result by Richa Poole MD (06/22 1402)      No acute osseous abnormality.   No radiopaque foreign body or gas in the soft tissues appreciated.   Chronic findings as above, similar to the prior study.      Workstation performed: [id number]         TRAUMA - CT head wo contrast   Final Result by Richa Poole MD (06/22 6296)   Left supratentorial acute subdural hematoma, up to 4 mm. No significant mass effect. No other foci of intracranial hemorrhage.   Unchanged chronic intracranial findings, as above.   Motion degraded evaluation of the facial bones and orbits. Secretions in the right frontal, bilateral ethmoid, left maxillary sinuses. If history of direct facial trauma, consider dedicated facial bone CT.   Limited evaluation of the orbits. No obvious acute intraconal injury.         I personally discussed this study with Bg Feliz on 6/22/2024 1:54 PM.                        Workstation performed: [id number]         TRAUMA - CT spine cervical wo contrast   Final Result by Richa Poole MD (06/22 1400)   Findings suggestive of hyperextension injury at C6-C7, with fracture of C7 anterior osteophyte, and anterior widening of C6-C7 disc space, as above. This is suggestive of anterior longitudinal ligament injury.   Acute nondisplaced fracture of the spinous process of C6.   Mild anterior wedge deformity of " T1, age indeterminate, not evident in 2023. No retropulsion.   Degenerative changes, similar to prior study.   Likely secondary minimal spondylolisthesis, similar to prior study.         I personally discussed this study with Bg Feliz on 6/22/2024 1:54 PM.                     Workstation performed: [id number]         TRAUMA - CT chest abdomen pelvis w contrast   Final Result by Richa Poole MD (06/22 9937)   Redemonstrated pulmonary fibrosis with bibasal predominance. Hypoventilatory changes with likely secondary inhomogeneous attenuation of the lungs with areas of faint groundglass opacity.   Suspected round atelectasis in the posterior inferior right lower lobe.   Bilateral pleural effusions, small, with some fissural entrapment.   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 Recommend follow-up in 12 months.   Esophageal thickening, new since prior study.   No acute intra-abdominal pathology.   Age indeterminate anterior wedge deformity of T1, moderate compression fracture of T8, moderate compression fracture of L2, deformity of the right inferior pubic ramus, not evident on prior studies. They may be nonacute, correlation with focal tenderness    is advised.   Overdistended urinary bladder, likely due to chronic outlet obstruction. If the patient cannot void, consider catheterization.   Prostatomegaly, stable in size since 2022.   Numerous chronic findings, similar to prior studies, as above.            I personally discussed this study with Bg Feliz on 6/22/2024 1:54 PM.                     Workstation performed: [id number]             EKG and Other Studies Reviewed on Admission:   EKG:  entricular paced rhythm, ventricular rate 62, , QTc 446, there .    ** Please Note: This note has been constructed using a voice recognition system. **

## 2024-06-22 NOTE — ED ATTENDING ATTESTATION
6/22/2024   ILea MD, saw and evaluated the patient. I have discussed the patient with the resident/non-physician practitioner and agree with the resident's/non-physician practitioner's findings, Plan of Care, and MDM as documented in the resident's/non-physician practitioner's note, except where noted. All available labs and Radiology studies were reviewed.  I was present for key portions of any procedure(s) performed by the resident/non-physician practitioner and I was immediately available to provide assistance.       At this point I agree with the current assessment done in the Emergency Department.  I have conducted an independent evaluation of this patient a history and physical is as follows:    Unit/Bed#: RM02 Encounter: [id number]    Chief Complaint   Patient presents with    Trauma     EMS states that neighbor was at patients house at 0900. When family arrived at 1130, patient was found on the floor. Patient states that he got dizzy and that caused him to fall. Denies any head injury. Denies use of any blood thinners. Patient has new bruising and lacerations to back. Bruising also noted to b/l arms.      89-year-old male presenting as a trauma evaluation after sustaining a fall.  Patient has past medical history of atrial fibrillation, not on anticoagulation, CHF, CKD, presenting after an unwitnessed fall.  Patient resides by himself.  Usually, his family visits with him.  He was last seen by his neighbor around 9:30 in the morning.  Around 11:00 in the morning, patient's daughter found patient wedged in the corner of the room.  Patient reports feeling dizzy and falling down.  He reports that his balance is usually off.  He does not believe he struck his head or lost consciousness.  He does have some pain in his mid back but denies any pain elsewhere.  Patient does not anticoagulated.  Patient denies chest pain, shortness of breath, nausea, vomiting, or abdominal pain.        Physical  Exam  PHYSICAL EXAM  ED Triage Vitals   Temperature Pulse Respirations Blood Pressure SpO2   06/22/24 1230 06/22/24 1230 06/22/24 1230 06/22/24 1226 06/22/24 1226   97.7 °F (36.5 °C) 66 18 136/65 (!) 89 %      Temp Source Heart Rate Source Patient Position - Orthostatic VS BP Location FiO2 (%)   06/22/24 1230 06/22/24 1230 06/22/24 1230 -- --   Temporal Monitor Sitting        Pain Score       --                  Primary Survey  Airway: Intact  Breathing: Breathing comfortably on room air, breath sounds present and equal bilaterally  Circulation: 2+ radial and dorsalis pedis pulses  Secondary Survey  Head: Atraumatic, no edema, ecchymoses  Eyes: PERRL, EOMI, Negative racoon sign  Nose: No deformity, no septal hematoma  Mouth/Throat: No dental fractures, no malocclusion  Neck: No midline C-spine tenderness  Chest: No deformity or ecchymosis.  Pacemaker in left upper chest.  CV: Regular rate and rhythm   Respiratory: Breathing comfortably on room air, there are diminished breath sounds in bilateral bases.    Abdomen: Non-distended, normal bowel signs, non-tender to palpation, negative seat-belt sign  Pelvis: Stable  Extremities: No deformities, moves all extremities spontaneously.  There is a skin tear to right base of thumb, hemostatic.  There is an abrasion to the left fibular head, hemostatic.   Back: Patient does have significant thoracic kyphosis.  There is ecchymosis to left greater than right thoracic back where the skin tear around mid thoracic spine.  There is tenderness with palpation along C-spine.  Neuro: GCS 15. Moves all extremities.  Vital signs and nursing notes reviewed      Labs and Imaging  Labs Reviewed   CBC AND DIFFERENTIAL - Abnormal       Result Value Ref Range Status    WBC 64.82 (*) 4.31 - 10.16 Thousand/uL Final    RBC 2.70 (*) 3.88 - 5.62 Million/uL Final    Hemoglobin 9.6 (*) 12.0 - 17.0 g/dL Final    Hematocrit 31.4 (*) 36.5 - 49.3 % Final     (*) 82 - 98 fL Final    MCH 35.6 (*)  26.8 - 34.3 pg Final    MCHC 30.6 (*) 31.4 - 37.4 g/dL Final    RDW 15.9 (*) 11.6 - 15.1 % Final    Platelets 35 (*) 149 - 390 Thousands/uL Final   COMPREHENSIVE METABOLIC PANEL - Abnormal    Sodium 138  135 - 147 mmol/L Final    Potassium 4.6  3.5 - 5.3 mmol/L Final    Chloride 103  96 - 108 mmol/L Final    CO2 21  21 - 32 mmol/L Final    ANION GAP 14 (*) 4 - 13 mmol/L Final    BUN 39 (*) 5 - 25 mg/dL Final    Creatinine 1.53 (*) 0.60 - 1.30 mg/dL Final    Comment: Standardized to IDMS reference method    Glucose 117  65 - 140 mg/dL Final    Comment: If the patient is fasting, the ADA then defines impaired fasting glucose as > 100 mg/dL and diabetes as > or equal to 123 mg/dL.    Calcium 10.0  8.4 - 10.2 mg/dL Final    AST 28  13 - 39 U/L Final    ALT 10  7 - 52 U/L Final    Comment: Specimen collection should occur prior to Sulfasalazine administration due to the potential for falsely depressed results.     Alkaline Phosphatase 49  34 - 104 U/L Final    Total Protein 7.8  6.4 - 8.4 g/dL Final    Albumin 3.7  3.5 - 5.0 g/dL Final    Total Bilirubin 3.77 (*) 0.20 - 1.00 mg/dL Final    Comment: Use of this assay is not recommended for patients undergoing treatment with eltrombopag due to the potential for falsely elevated results.  N-acetyl-p-benzoquinone imine (metabolite of Acetaminophen) will generate erroneously low results in samples for patients that have taken an overdose of Acetaminophen.    eGFR 37  ml/min/1.73sq m Final    Narrative:     [employer] guidelines for Chronic Kidney Disease (CKD):     Stage 1 with normal or high GFR (GFR > 90 mL/min/1.73 square meters)    Stage 2 Mild CKD (GFR = 60-89 mL/min/1.73 square meters)    Stage 3A Moderate CKD (GFR = 45-59 mL/min/1.73 square meters)    Stage 3B Moderate CKD (GFR = 30-44 mL/min/1.73 square meters)    Stage 4 Severe CKD (GFR = 15-29 mL/min/1.73 square meters)    Stage 5 End Stage CKD (GFR <15 mL/min/1.73 square meters)  Note: GFR  "calculation is accurate only with a steady state creatinine   PROTIME-INR - Abnormal    Protime 20.3 (*) 11.6 - 14.5 seconds Final    INR 1.76 (*) 0.84 - 1.19 Final   APTT - Abnormal    PTT 46 (*) 23 - 37 seconds Final    Comment: Therapeutic Heparin Range =  60-90 seconds   B-TYPE NATRIURETIC PEPTIDE (BNP) - Abnormal     (*) 0 - 100 pg/mL Final   MANUAL DIFFERENTIAL(PHLEBS DO NOT ORDER) - Abnormal    Segmented % 68  43 - 75 % Final    Bands % 5  0 - 8 % Final    Lymphocytes % 2 (*) 14 - 44 % Final    Monocytes % 14 (*) 4 - 12 % Final    Eosinophils % 0  0 - 6 % Final    Basophils % 0  0 - 1 % Final    Metamyelocytes % 5 (*) 0 - 1 % Final    Myelocytes % 5 (*) 0 - 1 % Final    Atypical Lymphocytes % 1 (*) <=0 % Final    Absolute Neutrophils 47.32 (*) 1.85 - 7.62 Thousand/uL Final    Absolute Lymphocytes 1.94  0.60 - 4.47 Thousand/uL Final    Absolute Monocytes 9.07 (*) 0.00 - 1.22 Thousand/uL Final    Absolute Eosinophils 0.00  0.00 - 0.40 Thousand/uL Final    Absolute Basophils 0.00  0.00 - 0.10 Thousand/uL Final    Absolute Metamyelocytes 3.24 (*) 0.00 - 0.10 Thousand/uL Final    Absolute Myelocytes 3.24 (*) 0.00 - 0.10 Thousand/uL Final    Total Counted     Final    RBC Morphology Present   Final    Platelet Estimate Decreased (*) Adequate Final    Anisocytosis Present   Final    Macrocytes Present   Final    Ovalocytes Present   Final    Polychromasia Present   Final   CK - Normal    Total   39 - 308 U/L Final   HS TROPONIN I 0HR - Normal    hs TnI 0hr 17  \"Refer to ACS Flowchart\"- see link ng/L Final    Comment:                                              Initial (time 0) result  If >=50 ng/L, Myocardial injury suggested ;  Type of myocardial injury and treatment strategy  to be determined.  If 5-49 ng/L, a delta result at 2 hours and or 4 hours will be needed to further evaluate.  If <4 ng/L, and chest pain has been >3 hours since onset, patient may qualify for discharge based on the HEART score " in the ED.  If <5 ng/L and <3hours since onset of chest pain, a delta result at 2 hours will be needed to further evaluate.    HS Troponin 99th Percentile URL of a Health Population=12 ng/L with a 95% Confidence Interval of 8-18 ng/L.    Second Troponin (time 2 hours)  If calculated delta >= 20 ng/L,  Myocardial injury suggested ; Type of myocardial injury and treatment strategy to be determined.  If 5-49 ng/L and the calculated delta is 5-19 ng/L, consult medical service for evaluation.  Continue evaluation for ischemia on ecg and other possible etiology and repeat hs troponin at 4 hours.  If delta is <5 ng/L at 2 hours, consider discharge based on risk stratification via the HEART score (if in ED), or BREA risk score in IP/Observation.    HS Troponin 99th Percentile URL of a Health Population=12 ng/L with a 95% Confidence Interval of 8-18 ng/L.   RBC MORPHOLOGY REFLEX TEST   TYPE AND SCREEN    ABO Grouping A   Final    Rh Factor Positive   Final    Antibody Screen Negative   Final    Specimen Expiration Date [id number]   Final       XR hand 3+ views RIGHT   ED Interpretation   X-ray independently interpreted by me.  Age-indeterminate triquetrium avulsion fracture      Final Result      No acute osseous abnormality.   No radiopaque foreign body or gas in the soft tissues appreciated.   Chronic findings as above, similar to the prior study.      Workstation performed: [id number]         TRAUMA - CT head wo contrast   Final Result   Left supratentorial acute subdural hematoma, up to 4 mm. No significant mass effect. No other foci of intracranial hemorrhage.   Unchanged chronic intracranial findings, as above.   Motion degraded evaluation of the facial bones and orbits. Secretions in the right frontal, bilateral ethmoid, left maxillary sinuses. If history of direct facial trauma, consider dedicated facial bone CT.   Limited evaluation of the orbits. No obvious acute intraconal injury.         I personally discussed this  study with Bg Feilz on 6/22/2024 1:54 PM.                        Workstation performed: [id number]         TRAUMA - CT spine cervical wo contrast   Final Result   Findings suggestive of hyperextension injury at C6-C7, with fracture of C7 anterior osteophyte, and anterior widening of C6-C7 disc space, as above. This is suggestive of anterior longitudinal ligament injury.   Acute nondisplaced fracture of the spinous process of C6.   Mild anterior wedge deformity of T1, age indeterminate, not evident in 2023. No retropulsion.   Degenerative changes, similar to prior study.   Likely secondary minimal spondylolisthesis, similar to prior study.         I personally discussed this study with Bg Feliz on 6/22/2024 1:54 PM.                     Workstation performed: [id number]         TRAUMA - CT chest abdomen pelvis w contrast   Final Result   Redemonstrated pulmonary fibrosis with bibasal predominance. Hypoventilatory changes with likely secondary inhomogeneous attenuation of the lungs with areas of faint groundglass opacity.   Suspected round atelectasis in the posterior inferior right lower lobe.   Bilateral pleural effusions, small, with some fissural entrapment.   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 Recommend follow-up in 12 months.   Esophageal thickening, new since prior study.   No acute intra-abdominal pathology.   Age indeterminate anterior wedge deformity of T1, moderate compression fracture of T8, moderate compression fracture of L2, deformity of the right inferior pubic ramus, not evident on prior studies. They may be nonacute, correlation with focal tenderness    is advised.   Overdistended urinary bladder, likely due to chronic outlet obstruction. If the patient cannot void, consider catheterization.   Prostatomegaly, stable in size since 2022.   Numerous chronic findings, similar to prior studies, as above.            I personally discussed this study with Bg Feliz on 6/22/2024 1:54  PM.                     Workstation performed: [id number]               Procedures  ECG 12 Lead Documentation Only    Date/Time: 6/22/2024 1:10 PM    Performed by: Lea Shultz MD  Authorized by: Lea Shultz MD    Comments:      Ventricular paced rhythm, ventricular rate 62, , QTc 446, there are premature ventricular contractions and trigeminy pattern.          ED Course  Medications   iohexol (OMNIPAQUE) 350 MG/ML injection (MULTI-DOSE) 100 mL (100 mL Intravenous Given 6/22/24 1305)   furosemide (LASIX) injection 40 mg (40 mg Intravenous Given 6/22/24 1442)     89-year-old gentleman presenting after a fall at home.  Patient has significant ecchymoses to his upper back.  Vital signs reviewed, afebrile, initially mildly hypoxic, within normal limits otherwise.  Patient evaluated as a trauma evaluation.  CT head, CT C-spine, as well as CT of the chest, abdomen, and pelvis with contrast pursued.  Labs obtained.  Shortly thereafter, patient's family arrives, patient is on hospice for his CHF.  Labs obtained, CMP is with creatinine of 1.53, up from 0.95.  BNP is elevated in keeping with CHF exacerbation.  CK is normal, making rhabdomyolysis less likely.  CBC does reveal leukocytosis of 64,000 with anemia and thrombocytopenia.  This could be concerning for new onset hematologic malignancy, though leukemoid reaction is on differential.  Patient is unable to void.  His CT scan does reveal a distended bladder in setting of prostatic megaly.  In discussion with the patient, will place Peña catheter.  Patient unfortunately does have some traumatic injuries that are significant, including subdural hematoma, as well as an unstable C6/C7 fracture, as well as a right superior pubic ramus fracture.  In discussion with family, they would like for patient to remain on hospice, however, they agree that he is likely unsafe to be at home by himself at least today.  Plan for patient to be admitted to the hospital with  temporary revocation of hospice with plan for placement versus further discussion with family regarding further disposition.  Patient is level 4 DNR/DNI.

## 2024-06-22 NOTE — ED PROVIDER NOTES
Emergency Department Trauma Note  Greyson Cisneros 89 y.o. male MRN: [id number]  Unit/Bed#: /401-01 Encounter: [id number]      Trauma Alert: Trauma Acuity: Trauma Evaluation  Model of Arrival: Mode of Arrival: ALS via Trauma Squad Name and Number: [facility]  Trauma Team: Current Providers  Attending Provider: Lea Shultz MD  Attending Provider: Heidi Kwon MD  Registered Nurse: Genesis Wright RN  Resident: Bg Feliz MD  Licensed Practical Nurse: Sameer Snow LPN  Patient Care Assistant: Kailee Gates  Registered Nurse: Sebastian Berry RN  Consultants:     Other: {routine consult; Epic consult order placed and consultant notified (via phone/text @ time 7858); (Hospice)      History of Present Illness     Chief Complaint:   Chief Complaint   Patient presents with    Trauma     EMS states that neighbor was at patients house at 0900. When family arrived at 1130, patient was found on the floor. Patient states that he got dizzy and that caused him to fall. Denies any head injury. Denies use of any blood thinners. Patient has new bruising and lacerations to back. Bruising also noted to b/l arms.      HPI:  Greyson Cisneros is a 89 y.o. male who presents with back injuries after unwitnessed fall.  Mechanism:Details of Incident: patient reports that he got dizzy and fell in the home. neighbor was there at 0900 and when family arrived at 1130 he was on the floor. bruising and laceration to back. Injury Date: 06/22/24 Injury Time: 1100 Injury Occurence Location - Specify County: [place]    89-year-old male history of A-fib, CHF, CKD brought in by ambulance after an unwitnessed fall.  Per EMS report, patient was seen by his neighbor/friend at 9:30 (3-hour PTA) around 11:00 (1.5-hour PTA) family found him wedged in a corner on the ground.  Patient states that he felt dizzy and fell to the ground on tiles.  Not entirely sure exactly how he fell or what he hit on the way down.  Does not think he lost  consciousness.  Is complaining of mild pain in his mid back but no head or neck pain.  Not on any blood thinners or antiplatelet agents.  At time evaluation not having any chest pain shortness of breath nausea or vomiting.      Review of Systems   Constitutional:  Negative for activity change, fever and unexpected weight change.   Respiratory:  Negative for cough, chest tightness and shortness of breath.    Cardiovascular:  Negative for chest pain and palpitations.   Gastrointestinal:  Negative for abdominal pain, diarrhea, nausea and vomiting.   Genitourinary:  Negative for dysuria and hematuria.   Musculoskeletal:  Positive for back pain and gait problem.   Skin:  Positive for wound.   Allergic/Immunologic: Negative for immunocompromised state.   Neurological:  Positive for dizziness. Negative for syncope.   All other systems reviewed and are negative.      Historical Information     Immunizations:   Immunization History   Administered Date(s) Administered    COVID-19 MODERNA VACC 0.5 ML IM 01/15/2021, 02/12/2021    Influenza Split High Dose Preservative Free IM 10/10/2013, 10/31/2014, 10/31/2014, 10/13/2015, 10/12/2016, 11/16/2017, 10/14/2019    Influenza, high dose seasonal 0.7 mL 09/26/2018, 10/22/2020    Pneumococcal Conjugate 13-Valent 09/26/2018    Pneumococcal Polysaccharide PPV23 10/10/2013    TD (adult) Preservative Free 07/27/2012       Past Medical History:   Diagnosis Date    Arthritis     Atrial fibrillation (HCC)     Last Assessed:8/10/17    Benign prostatic hyperplasia     Last Assessed:1/28/14    BPH (benign prostatic hyperplasia)     Complete atrioventricular block (HCC)     Diverticulosis     Last Assessed:4/30/13    Hypercholesterolemia     Hypertension     Hyponatremia 04/10/2022    Paroxysmal ventricular tachycardia (HCC)     Last Assessed:7/20/17    Pneumonia     Prostatitis     Last Assessed:12/19/12    Pulmonary artery hypertension (HCC)     mild pulmonary htn    Retention, urine     Right  bundle branch block     Last Assessed:13    Sepsis without acute organ dysfunction (HCC) 04/10/2022    Suicidal ideation 04/10/2022    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     Last Assessedl:10/8/14       Family History   Problem Relation Age of Onset    Hypertension Mother     Stroke Mother         Stroke Syndrome    Other Sister         pacemaker placement    Prostate cancer Brother     Diabetes Family      Past Surgical History:   Procedure Laterality Date    CARDIAC PACEMAKER PLACEMENT      [facility] PPM    COLONOSCOPY      HEMORRHOID SURGERY      Cauterization of hemorrhoids    HERNIA REPAIR      INCISION AND DRAINAGE ABSCESS / HEMATOMA OF BURSA / KNEE / THIGH      Fatty Tumor Removed    KNEE SURGERY Left     Steel Removes from left knee     Social History     Tobacco Use    Smoking status: Former     Current packs/day: 0.00     Types: Cigarettes     Quit date:      Years since quittin.5    Smokeless tobacco: Never   Vaping Use    Vaping status: Never Used   Substance Use Topics    Alcohol use: Not Currently     Alcohol/week: 0.0 standard drinks of alcohol     Comment: social drinker    Drug use: No     E-Cigarette/Vaping    E-Cigarette Use Never User      E-Cigarette/Vaping Substances    Nicotine No     THC No     CBD No     Flavoring No     Other No     Unknown No        Family History: non-contributory    Meds/Allergies   Prior to Admission Medications   Prescriptions Last Dose Informant Patient Reported? Taking?   LORazepam (Ativan) 0.5 mg tablet   Yes No   Sig: Take 0.5 mg by mouth every 6 (six) hours as needed for anxiety (SOB). CP meds on hold  Indications: Feeling Anxious, SOB   Morphine Sulfate, Concentrate, 20 mg/mL concentrated solution   Yes No   Sig: Take 5 mg by mouth every 3 (three) hours as needed for moderate pain or severe pain (SOB). CP meds on hold  Indications: Difficulty Breathing, Pain   bisacodyl (Bisacodyl Laxative) 10 mg suppository   Yes No   Sig: Insert 10 mg into  the rectum daily as needed for constipation. CP meds on hold  Indications: Constipation, Evacuation of Material from the Bowel   clotrimazole (MYCELEX) 10 mg isabell  Self No No   Sig: Take 1 tablet (10 mg total) by mouth in the morning and 1 tablet (10 mg total) in the evening and 1 tablet (10 mg total) before bedtime.   Patient not taking: Reported on 10/6/2023   doxazosin (CARDURA) 4 mg tablet   Yes No   Sig: Take 1 mg by mouth daily at bedtime. Indications: Benign Enlargement of Prostate   furosemide (LASIX) 20 mg tablet   Yes No   Sig: Take 20 mg by mouth daily as needed (edema ). Indications: Cardiac Failure, Edema   guaiFENesin (Mucinex) 600 mg 12 hr tablet   Yes No   Sig: Take 600 mg by mouth every 12 (twelve) hours. Indications: Cough   haloperidol (HALDOL) oral concentrated solution   Yes No   Sig: Take 1 mg by mouth every 6 (six) hours as needed for agitation (N/V). CP meds on hold  Indications: Abnormally Increased Activity, Problems with Behavior   hyoscyamine (LEVSIN/SL) 0.125 mg SL tablet   Yes No   Sig: Take 0.125 mg by mouth every 4 (four) hours as needed (SECRETIONS). CP meds on hold  Indications: SECRETIONS   oxyCODONE (ROXICODONE) 5 immediate release tablet   Yes No   Sig: Take 5 mg by mouth every 6 (six) hours as needed for moderate pain or severe pain (SOB). Indications: Acute Pain, Chronic Pain, SOB   oxygen gas   Yes No   Sig: Inhale 2 L/min daily as needed for dizziness (SOB). Indications: SOB   prochlorperazine (COMPAZINE) 10 mg tablet   Yes No   Sig: Take 10 mg by mouth every 6 (six) hours as needed for nausea or vomiting. CP meds on hold  Indications: Nausea and Vomiting   senna (SENOKOT) 8.6 mg   Yes No   Sig: Take 8.6 mg by mouth in the morning. Indications: Constipation, Evacuation of Material from the Bowel      Facility-Administered Medications: None       Allergies   Allergen Reactions    Ibuprofen Hives    Red Dye - Food Allergy Hives       PHYSICAL EXAM    PE limited by: hard of  hearing    Objective   Vitals:   First set: Temperature: 97.7 °F (36.5 °C) (06/22/24 1230)  Pulse: 66 (06/22/24 1230)  Respirations: 18 (06/22/24 1230)  Blood Pressure: 136/65 (06/22/24 1226)  SpO2: (!) 89 % (06/22/24 1226)    Primary Survey:   (A) Airway: patent  (B) Breathing:  Clear to auscultation bilaterally  (C) Circulation: Pulses:   pedal  2/4 and radial  2/4  (D) Disabliity:  GCS Total: 14 for confusion  (E) Expose:  Completed    Secondary Survey: (Click on Physical Exam tab above)  Physical Exam  Vitals and nursing note reviewed.   Constitutional:       Appearance: He is not toxic-appearing or diaphoretic.   HENT:      Head: Normocephalic and atraumatic.      Right Ear: External ear normal.      Left Ear: External ear normal.      Nose: Nose normal.      Mouth/Throat:      Mouth: Mucous membranes are moist.   Eyes:      General: No scleral icterus.     Extraocular Movements: Extraocular movements intact.      Conjunctiva/sclera: Conjunctivae normal.   Cardiovascular:      Rate and Rhythm: Normal rate and regular rhythm.      Pulses: Normal pulses.           Radial pulses are 2+ on the right side.        Dorsalis pedis pulses are 2+ on the right side and 2+ on the left side.      Heart sounds: Normal heart sounds, S1 normal and S2 normal. No murmur heard.  Pulmonary:      Effort: Pulmonary effort is normal. No respiratory distress.      Breath sounds: Normal breath sounds. No stridor. No wheezing.   Abdominal:      Palpations: Abdomen is soft.      Tenderness: There is no abdominal tenderness.   Musculoskeletal:         General: Normal range of motion.        Arms:       Cervical back: Normal range of motion.      Comments: Marked kyphosis.  Tenderness in lower T-spine with no step-offs or deformities.  Stage 1 and stage 2 sacral wounds. See pictures below.    Skin:     General: Skin is warm and dry.   Neurological:      General: No focal deficit present.      Mental Status: He is alert and oriented to  person, place, and time.   Psychiatric:         Mood and Affect: Mood normal.                   Cervical spine cleared by clinical criteria? No (imaging required)      Invasive Devices       Peripheral Intravenous Line  Duration             Peripheral IV 06/22/24 Right Antecubital <1 day              Drain  Duration             Urethral Catheter 16 Fr. <1 day                    Lab Results:   Results Reviewed       Procedure Component Value Units Date/Time    RBC Morphology Reflex Test [[id number]] Collected: 06/22/24 1242    Lab Status: Final result Specimen: Blood from Arm, Right Updated: 06/22/24 1401    CBC and differential [[id number]]  (Abnormal) Collected: 06/22/24 1242    Lab Status: Final result Specimen: Blood from Arm, Right Updated: 06/22/24 1325     WBC 64.82 Thousand/uL      RBC 2.70 Million/uL      Hemoglobin 9.6 g/dL      Hematocrit 31.4 %       fL      MCH 35.6 pg      MCHC 30.6 g/dL      RDW 15.9 %      Platelets 35 Thousands/uL     Manual Differential(PHLEBS Do Not Order) [[id number]]  (Abnormal) Collected: 06/22/24 1242    Lab Status: Final result Specimen: Blood from Arm, Right Updated: 06/22/24 1325     Segmented % 68 %      Bands % 5 %      Lymphocytes % 2 %      Monocytes % 14 %      Eosinophils % 0 %      Basophils % 0 %      Metamyelocytes % 5 %      Myelocytes % 5 %      Atypical Lymphocytes % 1 %      Absolute Neutrophils 47.32 Thousand/uL      Absolute Lymphocytes 1.94 Thousand/uL      Absolute Monocytes 9.07 Thousand/uL      Absolute Eosinophils 0.00 Thousand/uL      Absolute Basophils 0.00 Thousand/uL      Absolute Metamyelocytes 3.24 Thousand/uL      Absolute Myelocytes 3.24 Thousand/uL      Total Counted --     RBC Morphology Present     Platelet Estimate Decreased     Anisocytosis Present     Macrocytes Present     Ovalocytes Present     Polychromasia Present    HS Troponin 0hr (reflex protocol) [[id number]]  (Normal) Collected: 06/22/24 1242    Lab Status: Final result Specimen:  Blood from Arm, Right Updated: 06/22/24 1316     hs TnI 0hr 17 ng/L     B-Type Natriuretic Peptide(BNP) [[id number]]  (Abnormal) Collected: 06/22/24 1242    Lab Status: Final result Specimen: Blood from Arm, Right Updated: 06/22/24 1314      pg/mL     Comprehensive metabolic panel [[id number]]  (Abnormal) Collected: 06/22/24 1242    Lab Status: Final result Specimen: Blood from Arm, Right Updated: 06/22/24 1309     Sodium 138 mmol/L      Potassium 4.6 mmol/L      Chloride 103 mmol/L      CO2 21 mmol/L      ANION GAP 14 mmol/L      BUN 39 mg/dL      Creatinine 1.53 mg/dL      Glucose 117 mg/dL      Calcium 10.0 mg/dL      AST 28 U/L      ALT 10 U/L      Alkaline Phosphatase 49 U/L      Total Protein 7.8 g/dL      Albumin 3.7 g/dL      Total Bilirubin 3.77 mg/dL      eGFR 37 ml/min/1.73sq m     Narrative:      National Kid[employer] Foundation guidelines for Chronic Kidney Disease (CKD):     Stage 1 with normal or high GFR (GFR > 90 mL/min/1.73 square meters)    Stage 2 Mild CKD (GFR = 60-89 mL/min/1.73 square meters)    Stage 3A Moderate CKD (GFR = 45-59 mL/min/1.73 square meters)    Stage 3B Moderate CKD (GFR = 30-44 mL/min/1.73 square meters)    Stage 4 Severe CKD (GFR = 15-29 mL/min/1.73 square meters)    Stage 5 End Stage CKD (GFR <15 mL/min/1.73 square meters)  Note: GFR calculation is accurate only with a steady state creatinine    CK [[id number]]  (Normal) Collected: 06/22/24 1242    Lab Status: Final result Specimen: Blood from Arm, Right Updated: 06/22/24 1309     Total  U/L     Protime-INR [[id number]]  (Abnormal) Collected: 06/22/24 1242    Lab Status: Final result Specimen: Blood from Arm, Right Updated: 06/22/24 1307     Protime 20.3 seconds      INR 1.76    APTT [[id number]]  (Abnormal) Collected: 06/22/24 1242    Lab Status: Final result Specimen: Blood from Arm, Right Updated: 06/22/24 1307     PTT 46 seconds                    Imaging Studies:   Direct to CT: Yes  XR hand 3+ views RIGHT   ED  Interpretation by Bg Feliz MD (06/22 5976)   X-ray independently interpreted by me.  Age-indeterminate triquetrium avulsion fracture      Final Result by Richa Poole MD (06/22 6561)      No acute osseous abnormality.   No radiopaque foreign body or gas in the soft tissues appreciated.   Chronic findings as above, similar to the prior study.      Workstation performed: [id number]         TRAUMA - CT head wo contrast   Final Result by iRcha Poole MD (06/22 2146)   Left supratentorial acute subdural hematoma, up to 4 mm. No significant mass effect. No other foci of intracranial hemorrhage.   Unchanged chronic intracranial findings, as above.   Motion degraded evaluation of the facial bones and orbits. Secretions in the right frontal, bilateral ethmoid, left maxillary sinuses. If history of direct facial trauma, consider dedicated facial bone CT.   Limited evaluation of the orbits. No obvious acute intraconal injury.         I personally discussed this study with Bg Feliz on 6/22/2024 1:54 PM.                        Workstation performed: [id number]         TRAUMA - CT spine cervical wo contrast   Final Result by Richa Poole MD (06/22 1400)   Findings suggestive of hyperextension injury at C6-C7, with fracture of C7 anterior osteophyte, and anterior widening of C6-C7 disc space, as above. This is suggestive of anterior longitudinal ligament injury.   Acute nondisplaced fracture of the spinous process of C6.   Mild anterior wedge deformity of T1, age indeterminate, not evident in 2023. No retropulsion.   Degenerative changes, similar to prior study.   Likely secondary minimal spondylolisthesis, similar to prior study.         I personally discussed this study with Bg Feliz on 6/22/2024 1:54 PM.                     Workstation performed: [id number]         TRAUMA - CT chest abdomen pelvis w contrast   Final Result by Richa Poole MD (06/22 8913)   Redemonstrated pulmonary fibrosis with  bibasal predominance. Hypoventilatory changes with likely secondary inhomogeneous attenuation of the lungs with areas of faint groundglass opacity.   Suspected round atelectasis in the posterior inferior right lower lobe.   Bilateral pleural effusions, small, with some fissural entrapment.   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 Recommend follow-up in 12 months.   Esophageal thickening, new since prior study.   No acute intra-abdominal pathology.   Age indeterminate anterior wedge deformity of T1, moderate compression fracture of T8, moderate compression fracture of L2, deformity of the right inferior pubic ramus, not evident on prior studies. They may be nonacute, correlation with focal tenderness    is advised.   Overdistended urinary bladder, likely due to chronic outlet obstruction. If the patient cannot void, consider catheterization.   Prostatomegaly, stable in size since 2022.   Numerous chronic findings, similar to prior studies, as above.            I personally discussed this study with Bg Feliz on 6/22/2024 1:54 PM.                     Workstation performed: [id number]               Procedures  ECG 12 Lead Documentation Only    Date/Time: 6/22/2024 1:08 PM    Performed by: Bg Feliz MD  Authorized by: Bg Feliz MD    Indications / Diagnosis:  Fall, ?syncope  ECG reviewed by me, the ED Provider: yes    Patient location:  ED  Previous ECG:     Previous ECG:  Unavailable    Comparison to cardiac monitor: Yes    Interpretation:     Interpretation: abnormal    Rate:     ECG rate:  62    ECG rate assessment: normal    Rhythm:     Rhythm: paced    Pacing:     Capture:  Complete    Type of pacing:  Ventricular  Ectopy:     Ectopy: PVCs and trigeminy      PVCs:  Unifocal  QRS:     QRS axis:  Indeterminate    QRS intervals:  Wide  ST segments:     ST segments:  Normal  T waves:     T waves: inverted      Inverted:  AVL           ED Course  ED Course as of 06/22/24 1600   Sat Jun 22, 2024    1311 Total CK: 144   1311 Creatinine(!): 1.53  0.95 11 months ago   1330 WBC(!): 64.82  Markedly elevated. Was previously 17   1330 Hemoglobin(!): 9.6   1330 Platelet Count(!): 35   1330 hs TnI 0hr: 17   1330 BNP(!): 883   1402 TRAUMA - CT spine cervical wo contrast  Findings suggestive of hyperextension injury at C6-C7, with fracture of C7 anterior osteophyte, and anterior widening of C6-C7 disc space, as above. This is suggestive of anterior longitudinal ligament injury.  Acute nondisplaced fracture of the spinous process of C6.  Mild anterior wedge deformity of T1, age indeterminate, not evident in 2023. No retropulsion.  Degenerative changes, similar to prior study.  Likely secondary minimal spondylolisthesis, similar to prior study.     1403 TRAUMA - CT head wo contrast  Left supratentorial acute subdural hematoma, up to 4 mm. No significant mass effect. No other foci of intracranial hemorrhage.  Unchanged chronic intracranial findings, as above.  Motion degraded evaluation of the facial bones and orbits. Secretions in the right frontal, bilateral ethmoid, left maxillary sinuses. If history of direct facial trauma, consider dedicated facial bone CT.  Limited evaluation of the orbits. No obvious acute intraconal injury.     1405 TRAUMA - CT chest abdomen pelvis w contrast  Redemonstrated pulmonary fibrosis with bibasal predominance. Hypoventilatory changes with likely secondary inhomogeneous attenuation of the lungs with areas of faint groundglass opacity.  Suspected round atelectasis in the posterior inferior right lower lobe.  Bilateral pleural effusions, small, with some fissural entrapment.  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 Recommend follow-up in 12 months.  Esophageal thickening, new since prior study.  No acute intra-abdominal pathology.  Age indeterminate anterior wedge deformity of T1, moderate compression fracture of T8, moderate compression fracture of L2, deformity of the right inferior pubic  ramus, not evident on prior studies. They may be nonacute, correlation with focal tenderness   is advised.  Overdistended urinary bladder, likely due to chronic outlet obstruction. If the patient cannot void, consider catheterization.  Prostatomegaly, stable in size since 2022.  Numerous chronic findings, similar to prior studies, as above.     1406 XR hand 3+ views RIGHT  No acute osseous abnormality.  No radiopaque foreign body or gas in the soft tissues appreciated.  Chronic findings as above, similar to the prior study.             Medical Decision Making  Initial Impression: Will gets CT scans looking for traumatic injuries. Cardiac eval given preceding dizziness and significant cardiac history.  Will also check for electrolyte abnormalities and acute anemia.  No suspicion for infection at this time.  Some possibility that he could be dehydrated especially given how high is been.  Even if investigation is negative, will need to have a conversation with patient and family about safe discharge.  He lives by himself right now and reports that he is very clumsy.    Final Impression: Evaluation came back with several acute issues acute C7 fracture, possible anterior longitudinal ligament injury, subdural hematoma without mass effect, Age-indeterminate T1 fracture, acute T8 fracture, compression fraction of L2, right inferior pubic ramus fracture.  Also note acute urinary retention likely from enlarged prostate.  Several abnormalities with CBC including marked leukocytosis suggestive of malignancy with associated thrombocytopenia and acute anemia.  WILLIE which could be due to the obstruction or some other medical process.    Reviewed all of these findings with patient, his 2 daughters at bedside, and his granddaughter who is a psychiatrist over the phone.  Patient already on home hospice for CHF, however with these new issues he is not able to be discharged home without further support especially since he is  nonambulatory from the right inferior pubic ramus fracture.  Discussed with patient and family that on 1 extreme could do full therapy including antiseizure medications, transfer for trauma admit with possible neurosurgery interventions, aggressive fluid management, Peña catheter for urinary retention, involvement in the heme-onc team.  However given patient's underlying medical conditions, previous decision to be on home hospice, felt that the best pathway forward was limited medical intervention focused on increasing comfort including insertion of a Peña catheter to relieve the bladder obstruction and IV Lasix to aid in his breathing.  Holding off on placing a c-collar since he is are incredibly uncomfortable especially with his degree of kyphosis.  Discussed this plan with hospice legs and team who agreed with our assessment.  Patient admitted for further care and evaluation by the hospice team      Problems Addressed:  Bladder obstruction: chronic illness or injury with exacerbation, progression, or side effects of treatment  Closed fracture of right inferior pubic ramus, initial encounter (HCC): complicated acute illness or injury that poses a threat to life or bodily functions  Fall from standing, initial encounter: complicated acute illness or injury with systemic symptoms that poses a threat to life or bodily functions  Other closed nondisplaced fracture of seventh cervical vertebra, initial encounter (HCC): complicated acute illness or injury that poses a threat to life or bodily functions  Subdural hematoma, acute (HCC): complicated acute illness or injury that poses a threat to life or bodily functions    Amount and/or Complexity of Data Reviewed  Independent Historian: caregiver and EMS  Labs: ordered. Decision-making details documented in ED Course.  Radiology: ordered. Decision-making details documented in ED Course.  ECG/medicine tests: ordered and independent interpretation  performed.    Risk  Prescription drug management.  Decision regarding hospitalization.  Decision not to resuscitate or to de-escalate care because of poor prognosis.                Disposition  [facility] Transfer: No  Final diagnoses:   Fall from standing, initial encounter   Subdural hematoma, acute (HCC)   Other closed nondisplaced fracture of seventh cervical vertebra, initial encounter ([facility])   Closed fracture of right inferior pubic ramus, initial encounter ([facility])   Bladder obstruction     Time reflects when diagnosis was documented in both MDM as applicable and the Disposition within this note       Time User Action Codes Description Comment    6/22/2024  3:08 PM Lisa Felizdya Add [W19.XXXA] Fall from standing, initial encounter     6/22/2024  3:08 PM Ori Felizdidya Add [S06.5XAA] Subdural hematoma, acute ([facility])     6/22/2024  3:09 PM BenavieOrididya Add [S12.691A] Other closed nondisplaced fracture of seventh cervical vertebra, initial encounter ([facility])     6/22/2024  3:09 PM Ori Felizdidya Add [S32.591A] Closed fracture of right inferior pubic ramus, initial encounter ([facility])     6/22/2024  3:09 PM BenavieOrididya Add [N32.0] Bladder obstruction           ED Disposition       ED Disposition   Admit    Condition   Stable    Date/Time   Sat Jun 22, 2024  3:07 PM    Comment   Case was discussed with Dr. Kwon and the patient's admission status was agreed to be Admission Status: inpatient status to the service of Dr. Kwon .               Follow-up Information    None       Current Discharge Medication List        CONTINUE these medications which have NOT CHANGED    Details   bisacodyl (Bisacodyl Laxative) 10 mg suppository Insert 10 mg into the rectum daily as needed for constipation. CP meds on hold  Indications: Constipation, Evacuation of Material from the Bowel      clotrimazole (MYCELEX) 10 mg isabell Take 1 tablet (10 mg total) by mouth in the morning and 1 tablet (10 mg total) in the evening and 1  tablet (10 mg total) before bedtime.  Qty: 30 Anjelica, Refills: 0    Associated Diagnoses: Thrush      doxazosin (CARDURA) 4 mg tablet Take 1 mg by mouth daily at bedtime. Indications: Benign Enlargement of Prostate      furosemide (LASIX) 20 mg tablet Take 20 mg by mouth daily as needed (edema ). Indications: Cardiac Failure, Edema      guaiFENesin (Mucinex) 600 mg 12 hr tablet Take 600 mg by mouth every 12 (twelve) hours. Indications: Cough      haloperidol (HALDOL) oral concentrated solution Take 1 mg by mouth every 6 (six) hours as needed for agitation (N/V). CP meds on hold  Indications: Abnormally Increased Activity, Problems with Behavior      hyoscyamine (LEVSIN/SL) 0.125 mg SL tablet Take 0.125 mg by mouth every 4 (four) hours as needed (SECRETIONS). CP meds on hold  Indications: SECRETIONS      LORazepam (Ativan) 0.5 mg tablet Take 0.5 mg by mouth every 6 (six) hours as needed for anxiety (SOB). CP meds on hold  Indications: Feeling Anxious, SOB      Morphine Sulfate, Concentrate, 20 mg/mL concentrated solution Take 5 mg by mouth every 3 (three) hours as needed for moderate pain or severe pain (SOB). CP meds on hold  Indications: Difficulty Breathing, Pain      oxyCODONE (ROXICODONE) 5 immediate release tablet Take 5 mg by mouth every 6 (six) hours as needed for moderate pain or severe pain (SOB). Indications: Acute Pain, Chronic Pain, SOB      oxygen gas Inhale 2 L/min daily as needed for dizziness (SOB). Indications: SOB      prochlorperazine (COMPAZINE) 10 mg tablet Take 10 mg by mouth every 6 (six) hours as needed for nausea or vomiting. CP meds on hold  Indications: Nausea and Vomiting      senna (SENOKOT) 8.6 mg Take 8.6 mg by mouth in the morning. Indications: Constipation, Evacuation of Material from the Bowel           No discharge procedures on file.    PDMP Review       None            ED Provider  Electronically Signed by           Bg Feliz MD  06/22/24 [id number]

## 2024-06-22 NOTE — ASSESSMENT & PLAN NOTE
Multifactorial with history of pulmonary fibrosis, pulmonary hypertension, kyphosis  Continue oxygen for comfort

## 2024-06-22 NOTE — ASSESSMENT & PLAN NOTE
Malnutrition Findings:                                 BMI Findings:           Body mass index is 15.97 kg/m².   Pleasure feeds, comfort care measures

## 2024-06-22 NOTE — CASE MANAGEMENT
Case Management Progress Note    Patient name Greyson Cisneros  Location /401-01 MRN [id number]  : 1928 Date 2024       LOS (days): 0  Geometric Mean LOS (GMLOS) (days):   Days to GMLOS:        OBJECTIVE:        Current admission status: Inpatient  Preferred Pharmacy:   [id number] - [place] - [address]  [address]  [place] [address]  Phone: [phone] Fax: [phone]    Primary Care Provider: Laila Diaz DO    Primary Insurance: MEDICARE  Secondary Insurance: AETNA    PROGRESS NOTE:spoke with Daughters Krysten and Bridgette via phone with provider. Pt has been active with [facility] hospice since . Pt has been managing at home but had fall and was found this am by neighbor. Pt has had decline over last week. Per daughters they are unable to care for him in home or have anyone provide 24/7 care at this time. They are not interested in hospice house or SNF for placement. Their goal would be to have him return home with hospice if they can private hire. I explained the difficulty in getting staffing and they should sit with pt and discuss other options. Daughter provided a email of [email or web address] of private duty phone numbers emailed for them to start calling to see if they are able to find staff. On call hospice Liaison Hedy Sifuentes updated via epic chart. Cm to follow for all discharge needs.

## 2024-06-22 NOTE — PLAN OF CARE
Problem: Nutrition/Hydration-ADULT  Goal: Nutrient/Hydration intake appropriate for improving, restoring or maintaining nutritional needs  Description: Monitor and assess patient's nutrition/hydration status for malnutrition. Collaborate with interdisciplinary team and initiate plan and interventions as ordered.  Monitor patient's weight and dietary intake as ordered or per policy. Utilize nutrition screening tool and intervene as necessary. Determine patient's food preferences and provide high-protein, high-caloric foods as appropriate.     INTERVENTIONS:  - Monitor oral intake, urinary output, labs, and treatment plans  - Assess nutrition and hydration status and recommend course of action  - Evaluate amount of meals eaten  - Assist patient with eating if necessary   - Allow adequate time for meals  - Recommend/ encourage appropriate diets, oral nutritional supplements, and vitamin/mineral supplements  - Provide specific nutrition/hydration education as appropriate  - Include patient/family/caregiver in decisions related to nutrition  Outcome: Progressing     Problem: PAIN - ADULT  Goal: Verbalizes/displays adequate comfort level or baseline comfort level  Description: Interventions:  - Encourage patient to monitor pain and request assistance  - Assess pain using appropriate pain scale (0-10 pain scale)  - Administer analgesics based on type and severity of pain and evaluate response  - Implement non-pharmacological measures as appropriate and evaluate response  - Consider cultural and social influences on pain and pain management  - Notify physician/advanced practitioner if interventions unsuccessful or patient reports new pain  Outcome: Progressing     Problem: INFECTION - ADULT  Goal: Absence or prevention of progression during hospitalization  Description: INTERVENTIONS:  - Assess and monitor for signs and symptoms of infection  - Monitor lab/diagnostic results  - Monitor all insertion sites, i.e. indwelling  lines  - Administer medications as ordered  - Instruct and encourage patient and family to use good hand hygiene technique  Outcome: Progressing     Problem: SAFETY ADULT  Goal: Patient will remain free of falls  Description: INTERVENTIONS:  - Educate patient/family on patient safety including physical limitations  - Instruct patient to call for assistance with activity   - Consult OT/PT to assist with strengthening/mobility   - Keep Call bell within reach  - Keep bed low and locked with side rails adjusted as appropriate  - Keep care items and personal belongings within reach  - Initiate and maintain comfort rounds  - Make Fall Risk Sign visible to staff (high fall risk)  - Offer Toileting every 1-2 Hours, in advance of need  - Initiate/Maintain bed/chair alarm  - Obtain necessary fall risk management equipment: nonskid footwear  - Apply yellow socks and bracelet for high fall risk patients  - Consider moving patient to room near nurses station  Outcome: Progressing  Goal: Maintain or return to baseline ADL function  Description: INTERVENTIONS:  -  Assess patient's ability to carry out ADLs; (moderate to maximum assist)  - Assess/evaluate cause of self-care deficits (fatigue, limited movement, weakness)  - Assess range of motion  - Assess patient's mobility; develop plan if impaired  - Assess patient's need for assistive devices and provide as appropriate  - Encourage maximum independence but intervene and supervise when necessary  - Involve family in performance of ADLs  - Assess for home care needs following discharge   - Consider OT consult to assist with ADL evaluation and planning for discharge  - Provide patient education as appropriate  Outcome: Progressing  Goal: Maintains/Returns to pre admission functional level  Description: INTERVENTIONS:  - Perform AM-PAC 6 Click Basic Mobility/ Daily Activity assessment daily.  - Set and communicate daily mobility goal to care team and patient/family/caregiver.   -  Collaborate with rehabilitation services on mobility goals if consulted  - Perform Range of Motion 3-4 times a day.  - Reposition patient every 2 hours.  - Ambulate patient 3-4 times a day  - Out of bed to chair 3 times a day   - Out of bed for meals 3 times a day  - Out of bed for toileting  - Record patient progress and toleration of activity level   Outcome: Progressing     Problem: DISCHARGE PLANNING  Goal: Discharge to home or other facility with appropriate resources  Description: INTERVENTIONS:  - Identify barriers to discharge w/patient and caregiver  - Arrange for needed discharge resources and transportation as appropriate  - Identify discharge learning needs (meds, wound care, etc.)  - Refer to Case Management Department for coordinating discharge planning if the patient needs post-hospital services based on physician/advanced practitioner order or complex needs related to functional status, cognitive ability, or social support system  Outcome: Progressing     Problem: Knowledge Deficit  Goal: Patient/family/caregiver demonstrates understanding of disease process, treatment plan, medications, and discharge instructions  Description: Complete learning assessment and assess knowledge base.  Interventions:  - Provide teaching at level of understanding  - Provide teaching via preferred learning methods  Outcome: Progressing     Problem: SKIN/TISSUE INTEGRITY - ADULT  Goal: Skin Integrity remains intact(Skin Breakdown Prevention)  Description: Assess:  -Perform Nicanor assessment every shift  -Clean and moisturize skin every shift  -Inspect skin when repositioning, toileting, and assisting with ADLS  -Assess under medical devices every shift  -Assess extremities for adequate circulation and sensation     Bed Management:  -Have minimal linens on bed & keep smooth, unwrinkled  -Change linens as needed when moist or perspiring  -Avoid sitting or lying in one position for more than 2 hours while in bed  -Keep HOB  at 30-45 degrees     Toileting:  -Offer bedside commode  -Assess for incontinence every 1-2 hours and prn  -Use incontinent care products after each incontinent episode    Activity:  -Mobilize patient 3-4 times a day  -Encourage activity and walks on unit  -Encourage or provide ROM exercises   -Turn and reposition patient every 2 Hours  -Use appropriate equipment to lift or move patient in bed  -Instruct/ Assist with weight shifting every 2 hours when out of bed in chair  -Consider limitation of chair time 3-4 hour intervals    Skin Care:  -Avoid use of baby powder, tape, friction and shearing, hot water or constrictive clothing  -Relieve pressure over bony prominences  -Do not massage red bony areas    Next Steps:  -Teach patient strategies to minimize risks    -Consider consults to  interdisciplinary teams   Outcome: Progressing  Goal: Incision(s), wounds(s) or drain site(s) healing without S/S of infection  Description: INTERVENTIONS  - Assess and document dressing, incision, wound bed, drain sites and surrounding tissue  - Provide patient and family education  - Perform skin care/dressing changes as needed/ordered  Outcome: Progressing     Problem: GENITOURINARY - ADULT  Goal: Maintains or returns to baseline urinary function  Description: INTERVENTIONS:  - Assess urinary function  - Encourage oral fluids to ensure adequate hydration if ordered  - Administer IV fluids as ordered to ensure adequate hydration  - Administer ordered medications as needed  - Offer frequent toileting  - Follow urinary retention protocol if ordered  Outcome: Progressing  Goal: Urinary catheter remains patent  Description: INTERVENTIONS:  - Assess patency of urinary catheter  - If patient has a chronic guerrero, consider changing catheter if non-functioning  - Follow guidelines for intermittent irrigation of non-functioning urinary catheter  Outcome: Progressing     Problem: Prexisting or High Potential for Compromised Skin  Integrity  Goal: Skin integrity is maintained or improved  Description: INTERVENTIONS:  - Identify patients at risk for skin breakdown  - Assess and monitor skin integrity  - Assess and monitor nutrition and hydration status  - Monitor labs   - Assess for incontinence   - Turn and reposition patient  - Assist with mobility/ambulation  - Relieve pressure over bony prominences  - Avoid friction and shearing  - Provide appropriate hygiene as needed including keeping skin clean and dry  - Evaluate need for skin moisturizer/barrier cream  - Collaborate with interdisciplinary team   - Patient/family teaching  - Consider wound care consult   Outcome: Progressing

## 2024-06-22 NOTE — ASSESSMENT & PLAN NOTE
This is a 89-year-old male patient who already on hospice care at home, history of pulmonary fibrosis, CHF, BPH, pulmonary hypertension who presents status post what appears to be mechanical fall at home.  Per family the patient has been noted for decline over the past week, previously only had hospice visits weekly    Following the fall patient was found to have subdural hematoma, C7 fracture and other findings as below    The patient will require increased supports/24-hour care upon discharge.  Family is hoping that they would be able to find staffing to support patient's needs at home  Admitted under comfort care measures  Continue symptom management approach with pain medications including oxycodone, Ativan as needed and other supportive measures  Patient also noted for urinary retention for which a Peña catheter was placed  Case management input appreciated for discharge planning

## 2024-06-22 NOTE — ASSESSMENT & PLAN NOTE
Severe leukocytosis with WBC count of 64.82  Patient is admitted under comfort care measures, no additional workup is indicated although no obvious infectious source identified  No further blood draws

## 2024-06-22 NOTE — ASSESSMENT & PLAN NOTE
"S/p mechanical fall  CT head: \"Left supratentorial acute subdural hematoma, up to 4 mm. No significant mass effect. No other foci of intracranial hemorrhage.  Admitted under comfort care, no AC/DVT prophylaxis  "

## 2024-06-22 NOTE — ASSESSMENT & PLAN NOTE
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 Recommend follow-up in 12 months.   Findings noted, however limited clinical significance given that the patient is on hospice care

## 2024-06-22 NOTE — ASSESSMENT & PLAN NOTE
"Following mechanical fall  CT scanning revealing \"hyperextension injury at C6-C7, with fracture of C7 anterior osteophyte, and anterior widening of C6-C7 disc space, as above. This is suggestive of anterior longitudinal ligament injury. Acute nondisplaced fracture of the spinous process of C6.\"  Supportive management, comfort care with plan for hospice placement  "

## 2024-06-22 NOTE — PLAN OF CARE
Problem: Nutrition/Hydration-ADULT  Goal: Nutrient/Hydration intake appropriate for improving, restoring or maintaining nutritional needs  Description: Monitor and assess patient's nutrition/hydration status for malnutrition. Collaborate with interdisciplinary team and initiate plan and interventions as ordered.  Monitor patient's weight and dietary intake as ordered or per policy. Utilize nutrition screening tool and intervene as necessary. Determine patient's food preferences and provide high-protein, high-caloric foods as appropriate.     INTERVENTIONS:  - Monitor oral intake, urinary output, labs, and treatment plans  - Assess nutrition and hydration status and recommend course of action  - Evaluate amount of meals eaten  - Assist patient with eating if necessary   - Allow adequate time for meals  - Recommend/ encourage appropriate diets, oral nutritional supplements, and vitamin/mineral supplements  - Provide specific nutrition/hydration education as appropriate  - Include patient/family/caregiver in decisions related to nutrition  Outcome: Progressing     Problem: PAIN - ADULT  Goal: Verbalizes/displays adequate comfort level or baseline comfort level  Description: Interventions:  - Encourage patient to monitor pain and request assistance  - Assess pain using appropriate pain scale (0-10 pain scale)  - Administer analgesics based on type and severity of pain and evaluate response  - Implement non-pharmacological measures as appropriate and evaluate response  - Consider cultural and social influences on pain and pain management  - Notify physician/advanced practitioner if interventions unsuccessful or patient reports new pain  Outcome: Progressing     Problem: INFECTION - ADULT  Goal: Absence or prevention of progression during hospitalization  Description: INTERVENTIONS:  - Assess and monitor for signs and symptoms of infection  - Monitor lab/diagnostic results  - Monitor all insertion sites, i.e. indwelling  lines  - Administer medications as ordered  - Instruct and encourage patient and family to use good hand hygiene technique  Outcome: Progressing     Problem: SAFETY ADULT  Goal: Patient will remain free of falls  Description: INTERVENTIONS:  - Educate patient/family on patient safety including physical limitations  - Instruct patient to call for assistance with activity   - Consult OT/PT to assist with strengthening/mobility   - Keep Call bell within reach  - Keep bed low and locked with side rails adjusted as appropriate  - Keep care items and personal belongings within reach  - Initiate and maintain comfort rounds  - Make Fall Risk Sign visible to staff (high fall risk)  - Offer Toileting every 1-2 Hours, in advance of need  - Initiate/Maintain bed/chair alarm  - Obtain necessary fall risk management equipment: nonskid footwear  - Apply yellow socks and bracelet for high fall risk patients  - Consider moving patient to room near nurses station  Outcome: Progressing  Goal: Maintain or return to baseline ADL function  Description: INTERVENTIONS:  -  Assess patient's ability to carry out ADLs; (moderate to maximum assist)  - Assess/evaluate cause of self-care deficits (fatigue, limited movement, weakness)  - Assess range of motion  - Assess patient's mobility; develop plan if impaired  - Assess patient's need for assistive devices and provide as appropriate  - Encourage maximum independence but intervene and supervise when necessary  - Involve family in performance of ADLs  - Assess for home care needs following discharge   - Consider OT consult to assist with ADL evaluation and planning for discharge  - Provide patient education as appropriate  Outcome: Progressing  Goal: Maintains/Returns to pre admission functional level  Description: INTERVENTIONS:  - Perform AM-PAC 6 Click Basic Mobility/ Daily Activity assessment daily.  - Set and communicate daily mobility goal to care team and patient/family/caregiver.   -  Collaborate with rehabilitation services on mobility goals if consulted  - Perform Range of Motion 3-4 times a day.  - Reposition patient every 2 hours.  - Ambulate patient 3-4 times a day  - Out of bed to chair 3 times a day   - Out of bed for meals 3 times a day  - Out of bed for toileting  - Record patient progress and toleration of activity level   Outcome: Progressing     Problem: DISCHARGE PLANNING  Goal: Discharge to home or other facility with appropriate resources  Description: INTERVENTIONS:  - Identify barriers to discharge w/patient and caregiver  - Arrange for needed discharge resources and transportation as appropriate  - Identify discharge learning needs (meds, wound care, etc.)  - Refer to Case Management Department for coordinating discharge planning if the patient needs post-hospital services based on physician/advanced practitioner order or complex needs related to functional status, cognitive ability, or social support system  Outcome: Progressing     Problem: Knowledge Deficit  Goal: Patient/family/caregiver demonstrates understanding of disease process, treatment plan, medications, and discharge instructions  Description: Complete learning assessment and assess knowledge base.  Interventions:  - Provide teaching at level of understanding  - Provide teaching via preferred learning methods  Outcome: Progressing     Problem: SKIN/TISSUE INTEGRITY - ADULT  Goal: Skin Integrity remains intact(Skin Breakdown Prevention)  Description: Assess:  -Perform Nicanor assessment every shift  -Clean and moisturize skin every shift  -Inspect skin when repositioning, toileting, and assisting with ADLS  -Assess under medical devices every shift  -Assess extremities for adequate circulation and sensation     Bed Management:  -Have minimal linens on bed & keep smooth, unwrinkled  -Change linens as needed when moist or perspiring  -Avoid sitting or lying in one position for more than 2 hours while in bed  -Keep HOB  at 30-45 degrees     Toileting:  -Offer bedside commode  -Assess for incontinence every 1-2 hours and prn  -Use incontinent care products after each incontinent episode    Activity:  -Mobilize patient 3-4 times a day  -Encourage activity and walks on unit  -Encourage or provide ROM exercises   -Turn and reposition patient every 2 Hours  -Use appropriate equipment to lift or move patient in bed  -Instruct/ Assist with weight shifting every 2 hours when out of bed in chair  -Consider limitation of chair time 3-4 hour intervals    Skin Care:  -Avoid use of baby powder, tape, friction and shearing, hot water or constrictive clothing  -Relieve pressure over bony prominences  -Do not massage red bony areas    Next Steps:  -Teach patient strategies to minimize risks    -Consider consults to  interdisciplinary teams   Outcome: Progressing  Goal: Incision(s), wounds(s) or drain site(s) healing without S/S of infection  Description: INTERVENTIONS  - Assess and document dressing, incision, wound bed, drain sites and surrounding tissue  - Provide patient and family education  - Perform skin care/dressing changes as needed/ordered  Outcome: Progressing     Problem: GENITOURINARY - ADULT  Goal: Maintains or returns to baseline urinary function  Description: INTERVENTIONS:  - Assess urinary function  - Encourage oral fluids to ensure adequate hydration if ordered  - Administer IV fluids as ordered to ensure adequate hydration  - Administer ordered medications as needed  - Offer frequent toileting  - Follow urinary retention protocol if ordered  Outcome: Progressing  Goal: Urinary catheter remains patent  Description: INTERVENTIONS:  - Assess patency of urinary catheter  - If patient has a chronic guerrero, consider changing catheter if non-functioning  - Follow guidelines for intermittent irrigation of non-functioning urinary catheter  Outcome: Progressing

## 2024-06-22 NOTE — ASSESSMENT & PLAN NOTE
"Wt Readings from Last 3 Encounters:   06/22/24 59.5 kg (131 lb 3.2 oz)   04/30/24 66.2 kg (146 lb)   04/08/24 66.2 kg (146 lb)     Patient appears volume depleted on exam, despite \"Bilateral pleural effusions, small, with some fissural entrapment\" noted on CT  Admitted for comfort care  Hold home Lasix  Monitor respiratory symptoms        "

## 2024-06-22 NOTE — ASSESSMENT & PLAN NOTE
Patient presented with urinary retention, Peña catheter placed  Continue [facility]

## 2024-06-23 NOTE — PROGRESS NOTES
"[facility]  Progress Note  Name: Greyson Cisneros I  MRN: [id number]  Unit/Bed#: 401-01 I Date of Admission: 6/22/2024   Date of Service: 6/23/2024 I Hospital Day: 1    Assessment & Plan   * Comfort measures only status  Assessment & Plan  This is a 89-year-old male patient who already on hospice care at home, history of pulmonary fibrosis, CHF, BPH, pulmonary hypertension who presents status post what appears to be mechanical fall at home.  Per family the patient has been noted for decline over the past week, previously only had hospice visits weekly    Following the fall patient was found to have subdural hematoma, C7 fracture and other findings as below    The patient will require increased supports/24-hour care upon discharge.  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 Recommend follow-up in 12 months.   Findings noted, however limited clinical significance given that the patient is on hospice care    Closed fracture of seventh cervical vertebra (HCC)  Assessment & Plan  Following mechanical fall  CT scanning revealing \"hyperextension injury at C6-C7, with fracture of C7 anterior osteophyte, and anterior widening of C6-C7 disc space, as above. This is suggestive of anterior longitudinal ligament injury. Acute nondisplaced fracture of the spinous process of C6.\"  Supportive management, comfort care with plan for hospice placement upon discharge    Hypoxia  Assessment & " "Plan  Multifactorial with history of pulmonary fibrosis, pulmonary hypertension, kyphosis  Patient was seen off oxygen this morning in no respiratory distress - continues to use as needed for comfort    Subdural hematoma (HCC)  Assessment & Plan  S/p mechanical fall  CT head: \"Left supratentorial acute subdural hematoma, up to 4 mm. No significant mass effect. No other foci of intracranial hemorrhage.  Admitted under comfort care, no AC/DVT prophylaxis    Esophageal thickening  Assessment & Plan  New finding on CT  Does not report GI symptoms  Comfort care pleasure feeds    Urinary retention  Assessment & Plan  With history of BPH  Peña catheter placed in ED    Acute diastolic heart failure (HCC)  Assessment & Plan  Wt Readings from Last 3 Encounters:   06/22/24 59.5 kg (131 lb 3.2 oz)   04/30/24 66.2 kg (146 lb)   04/08/24 66.2 kg (146 lb)     Patient appears volume depleted on exam, despite \"Bilateral pleural effusions, small, with some fissural entrapment\" noted on CT  Admitted for comfort care  Hold home Lasix  Monitor respiratory symptoms - patient currently denies and is in no distress           Constipation  Assessment & Plan  Bowel regimen    Presence of permanent cardiac pacemaker  Assessment & Plan  Noted    Permanent atrial fibrillation (HCC)  Assessment & Plan  Not on AV node blocking medications or anticoagulation  Admitted under comfort care    Pulmonary artery hypertension (HCC)  Assessment & Plan  Oxygen as needed for comfort    Benign prostatic hyperplasia  Assessment & Plan  Patient presented with urinary retention, Peña catheter placed  Continue [facility]               VTE Pharmacologic Prophylaxis: VTE Score: 3 Moderate Risk (Score 3-4) - Pharmacological DVT Prophylaxis Contraindicated. Sequential Compression Devices Ordered.    Mobility:   Basic Mobility Inpatient Raw Score: 11  -[facility] Goal: 4: Move to chair/commode  JH-HLM Achieved: 3: Sit at edge of bed  JH-HLM Goal achieved. Continue to " encourage appropriate mobility.    Patient Centered Rounds: I performed bedside rounds with nursing staff today.   Discussions with Specialists or Other Care Team Provider: NAKUL    Education and Discussions with Family / Patient: Updated  (daughter) via phone.    Total Time Spent on Date of Encounter in care of patient: 35 mins. This time was spent on one or more of the following: performing physical exam; counseling and coordination of care; obtaining or reviewing history; documenting in the medical record; reviewing/ordering tests, medications or procedures; communicating with other healthcare professionals and discussing with patient's family/caregivers.    Current Length of Stay: 1 day(s)  Current Patient Status: Inpatient   Certification Statement: The patient will continue to require additional inpatient hospital stay due to close monitoring  Discharge Plan: Anticipate discharge in 24-48 hrs to hospice care, location TBD    Code Status: Level 4 - Comfort Care    Subjective:   Patient currently voices no complaints.  No overnight events reported.    Objective:     Vitals:   Temp (24hrs), Av.5 °F (36.4 °C), Min:97.3 °F (36.3 °C), Max:97.7 °F (36.5 °C)    Temp:  [97.3 °F (36.3 °C)-97.7 °F (36.5 °C)] 97.3 °F (36.3 °C)  HR:  [57-72] 60  Resp:  [16-36] 24  BP: (125-152)/(44-65) 127/44  SpO2:  [86 %-100 %] 100 %  Body mass index is 15.97 kg/m².     Input and Output Summary (last 24 hours):     Intake/Output Summary (Last 24 hours) at 2024 0846  Last data filed at 2024 0832  Gross per 24 hour   Intake 360 ml   Output 2550 ml   Net -2190 ml       Physical Exam:   Physical Exam  Constitutional:       General: He is not in acute distress.     Appearance: He is ill-appearing.   HENT:      Head: Normocephalic and atraumatic.   Eyes:      Conjunctiva/sclera: Conjunctivae normal.   Cardiovascular:      Rate and Rhythm: Normal rate. Rhythm irregular.   Pulmonary:      Effort: No respiratory distress.       Breath sounds: No wheezing or rales.   Abdominal:      General: There is no distension.      Tenderness: There is no abdominal tenderness. There is no guarding.   Genitourinary:     Comments: Peña catheter  Skin:     Findings: Bruising (back) present.   Neurological:      Mental Status: Mental status is at baseline.   Psychiatric:         Mood and Affect: Mood normal.          Additional Data:     Labs:  Results from last 7 days   Lab Units 06/22/24  1242   WBC Thousand/uL 64.82*   HEMOGLOBIN g/dL 9.6*   HEMATOCRIT % 31.4*   PLATELETS Thousands/uL 35*   BANDS PCT % 5   LYMPHO PCT % 2*   MONO PCT % 14*   EOS PCT % 0     Results from last 7 days   Lab Units 06/22/24  1242   SODIUM mmol/L 138   POTASSIUM mmol/L 4.6   CHLORIDE mmol/L 103   CO2 mmol/L 21   BUN mg/dL 39*   CREATININE mg/dL 1.53*   ANION GAP mmol/L 14*   CALCIUM mg/dL 10.0   ALBUMIN g/dL 3.7   TOTAL BILIRUBIN mg/dL 3.77*   ALK PHOS U/L 49   ALT U/L 10   AST U/L 28   GLUCOSE RANDOM mg/dL 117     Results from last 7 days   Lab Units 06/22/24  1242   INR  1.76*                   Lines/Drains:  Invasive Devices       Peripheral Intravenous Line  Duration             Peripheral IV 06/22/24 Right Antecubital <1 day              Drain  Duration             Urethral Catheter 16 Fr. <1 day                  Urinary Catheter:  Goal for removal:  TBD - using for comfort            Imaging: no new    Recent Cultures (last 7 days):         Last 24 Hours Medication List:   Current Facility-Administered Medications   Medication Dose Route Frequency Provider Last Rate    bisacodyl  10 mg Rectal Daily PRN Heidi Kwon MD      doxazosin  1 mg Oral HS Heidi Kwon MD      guaiFENesin  600 mg Oral Q12H EVON Heidi Kwon MD      haloperidol  1 mg Oral Q6H PRN Heidi Kwon MD      hyoscyamine  0.125 mg Oral Q4H PRN Heidi Kwon MD      LORazepam  0.5 mg Oral Q6H PRN Heidi Kwon MD      Morphine Sulfate (Concentrate)  5  mg Oral Q3H PRN Heidi Kwon MD      oxyCODONE  5 mg Oral Q4H PRN Heidi Kwon MD      Or    oxyCODONE  7.5 mg Oral Q4H PRN Heidi Kwon MD      prochlorperazine  10 mg Oral Q6H PRN Heidi Kwon MD      senna  8.6 mg Oral Daily Heidi Kwon MD          Today, Patient Was Seen By: Heidi Kwon MD    **Please Note: This note may have been constructed using a voice recognition system.**

## 2024-06-23 NOTE — ASSESSMENT & PLAN NOTE
Patient presented with urinary retention, Peña catheter placed  Continue [facility]

## 2024-06-23 NOTE — ASSESSMENT & PLAN NOTE
Multifactorial with history of pulmonary fibrosis, pulmonary hypertension, kyphosis  Patient was seen off oxygen this morning in no respiratory distress - continues to use as needed for comfort

## 2024-06-23 NOTE — ASSESSMENT & PLAN NOTE
"Wt Readings from Last 3 Encounters:   06/22/24 59.5 kg (131 lb 3.2 oz)   04/30/24 66.2 kg (146 lb)   04/08/24 66.2 kg (146 lb)     Patient appears volume depleted on exam, despite \"Bilateral pleural effusions, small, with some fissural entrapment\" noted on CT  Admitted for comfort care  Hold home Lasix  Monitor respiratory symptoms - patient currently denies and is in no distress         "

## 2024-06-23 NOTE — ASSESSMENT & PLAN NOTE
Care Transitions Follow Up Call    Patient Current Location:  Home: [address] [place] [address]  [address] [facility] [address] [employer]    Care Transition Nurse contacted the patient by telephone to follow up after admission on 23. Verified name and  with patient as identifiers. Patient: Avelina Chapman  Patient : 1960   MRN: [id number]  Reason for Admission: Syncope & collapse  Discharge Date: 23 RARS: Readmission Risk Score: 8.6      Needs to be reviewed by the provider   Additional needs identified to be addressed with provider: No  none             Method of communication with provider: none. ACM called patient for CT follow up. She reports had OV with Jos Monzon, cardiology NP last week. States Tanisha concerned that she has not yet had PFO closed yet. Patient states she discussed with Vasyl Smart that Dr. Patricia Guillermo left it with her that she can have it when she is ready and did not seem to make her think she needed to have done right now. Patient states Vasyl Smart was going to talk to Dr. Patricia Guillermo about getting her scheduled for PFO surgery after H & H stable. She will have H & H Q 2 weeks. Planning to go to lab today. States she was told she may not need pre-op OV with Dr. Patricia Guillermo since she has had recent SRINIVASA. She has not yet received phone call from cardiology office. Patient states felt better yesterday at work than she has in [place]. Denies near syncope or black stools. She reports BP has been good; 118/79 this morning. She is not taking HCTZ. She continues to use 2 LPM oxygen at night with sleep as ordered by pulmonology. States she takes the oxygen off while she is sleeping. Patient is going to call pulmonology to see about having repeat home pulse ox study as she wants to see if she still needs oxygen with sleep. Denies immediate needs for ACM.      Addressed changes since last contact:  self management-BP monitoring  follow-up appointment-schedule follow up with Dr. Opal Pacheco; Dr. 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 Recommend follow-up in 12 months.   Findings noted, however limited clinical significance given that the patient is on hospice care   None known

## 2024-06-23 NOTE — ASSESSMENT & PLAN NOTE
"Following mechanical fall  CT scanning revealing \"hyperextension injury at C6-C7, with fracture of C7 anterior osteophyte, and anterior widening of C6-C7 disc space, as above. This is suggestive of anterior longitudinal ligament injury. Acute nondisplaced fracture of the spinous process of C6.\"  Supportive management, comfort care with plan for hospice placement upon discharge  "

## 2024-06-24 NOTE — PROGRESS NOTES
[facility]  Progress Note  Name: Greyson Cisneros I  MRN: [id number]  Unit/Bed#: 401-01 I Date of Admission: 6/22/2024   Date of Service: 6/24/2024 I Hospital Day: 2    Assessment & Plan   * Comfort measures only status  Assessment & Plan  This is a 89-year-old male patient who already on hospice care at home, history of pulmonary fibrosis, CHF, BPH, pulmonary hypertension who presents status-post what appears to be a mechanical fall at home.  Per his family, the patient has been noted for decline over the past week, previously only had hospice visits weekly.    Following the fall patient was found to have subdural hematoma and a C7 fracture.    The patient will require increased supports/24-hour care upon discharge.  Family is hoping that they would be able to find staffing to support the patient's needs at home.  Admitted under comfort care measures  Continue symptom management approach with pain medications including oxycodone and morphine as well as Ativan.  The patient was also noted for urinary retention for which a Peña catheter was placed.  Case management input appreciated for discharge planning    Closed fracture of seventh cervical vertebra (HCC)  Assessment & Plan  S/P mechanical fall  Comfort care measures only with hospice care      Subdural hematoma (HCC)  Assessment & Plan  S/P mechanical fall  Comfort care measures only with hospice care    Leukocytosis  Assessment & Plan  Likely leukemoid reaction  Comfort care measures only with hospice care    Urinary retention  Assessment & Plan  History of BPH  A Peña catheter was placed in the ED, which will be maintained at this time with the patient being on comfort care measures only status with hospice care.        VTE Pharmacologic Prophylaxis: VTE Score: 13 High Risk (Score >/= 5) - Pharmacological DVT Prophylaxis Contraindicated due to comfort measures only status. Sequential Compression Devices Contraindicated due to comfort  measures only status.    Mobility:   Basic Mobility Inpatient Raw Score: 11  JH-HLM Goal: 4: Move to chair/commode  JH-HLM Achieved: 2: Bed activities/Dependent transfer  JH-HLM Goal NOT achieved. Continue with multidisciplinary rounding and encourage appropriate mobility to improve upon JH-HLM goals.    Patient Centered Rounds: I performed bedside rounds with nursing staff today.     Total Time Spent on Date of Encounter in care of patient: 35 mins. This time was spent on one or more of the following: performing physical exam; counseling and coordination of care; obtaining or reviewing history; documenting in the medical record; reviewing/ordering tests, medications or procedures; communicating with other healthcare professionals and discussing with patient's family/caregivers.    Current Length of Stay: 2 day(s)  Current Patient Status: Inpatient   Certification Statement: The patient will continue to require additional inpatient hospital stay due to the need for pain control and comfort measures only with hospice care.  Discharge Plan: Anticipate discharge in 24-48 hrs to SNF with hospice care.    Code Status: Level 4 - Comfort Care    Subjective:   The patient was seen and examined.  The patient is doing better.  He offers no specific complaints this morning other than feeling tired.  No chest pain.  No shortness of breath.  No abdominal pain.  No nausea or vomiting.      Objective:     Vitals:   Temp (24hrs), Av.3 °F (36.3 °C), Min:97.3 °F (36.3 °C), Max:97.3 °F (36.3 °C)    Temp:  [97.3 °F (36.3 °C)] 97.3 °F (36.3 °C)  HR:  [60-68] 61  Resp:  [16-20] 16  BP: ()/(46-58) 131/46  SpO2:  [98 %] 98 %  Body mass index is 15.97 kg/m².     Input and Output Summary (last 24 hours):     Intake/Output Summary (Last 24 hours) at 2024 1300  Last data filed at 2024 0756  Gross per 24 hour   Intake 540 ml   Output 800 ml   Net -260 ml       Physical Exam:   Physical Exam  General:  NAD, follows  commands  HEENT:  NC/AT, mucous membranes moist  Neck:  Supple, No JVP elevation  CV:  + S1, + S2, RRR  Pulm:  Lung fields are CTA bilaterally  Abd:  Soft, Non-tender, Non-distended  Ext:  No clubbing/cyanosis/edema  Skin:  No rashes  Neuro:  Lethargic, awakens to verbal stimuli, confused at times  Psych:  Lethargic mood and affect      Additional Data:    Labs:  Results from last 7 days   Lab Units 06/22/24  1242   WBC Thousand/uL 64.82*   HEMOGLOBIN g/dL 9.6*   HEMATOCRIT % 31.4*   PLATELETS Thousands/uL 35*   BANDS PCT % 5   LYMPHO PCT % 2*   MONO PCT % 14*   EOS PCT % 0     Results from last 7 days   Lab Units 06/22/24  1242   SODIUM mmol/L 138   POTASSIUM mmol/L 4.6   CHLORIDE mmol/L 103   CO2 mmol/L 21   BUN mg/dL 39*   CREATININE mg/dL 1.53*   ANION GAP mmol/L 14*   CALCIUM mg/dL 10.0   ALBUMIN g/dL 3.7   TOTAL BILIRUBIN mg/dL 3.77*   ALK PHOS U/L 49   ALT U/L 10   AST U/L 28   GLUCOSE RANDOM mg/dL 117     Results from last 7 days   Lab Units 06/22/24  1242   INR  1.76*                   Lines/Drains:  Invasive Devices       Peripheral Intravenous Line  Duration             Peripheral IV 06/23/24 Dorsal (posterior);Right Forearm <1 day              Drain  Duration             Urethral Catheter 16 Fr. 1 day                  Urinary Catheter:  Goal for removal: N/A- Discharging with Peña               Imaging: No pertinent imaging reviewed.    Recent Cultures (last 7 days):         Last 24 Hours Medication List:   Current Facility-Administered Medications   Medication Dose Route Frequency Provider Last Rate    bisacodyl  10 mg Rectal Daily PRN Heidi Kwon MD      doxazosin  1 mg Oral HS Heidi Kwon MD      guaiFENesin  600 mg Oral Q12H EVON Heidi Kwon MD      haloperidol  1 mg Oral Q6H PRN Heidi Kwon MD      hyoscyamine  0.125 mg Oral Q4H PRN Heidi Kwon MD      LORazepam  0.5 mg Oral Q6H PRN Heidi Kwon MD      Morphine Sulfate (Concentrate)  5 mg  Oral Q3H PRN Heidi Kwon MD      oxyCODONE  5 mg Oral Q4H PRN Heidi Kwon MD      Or    oxyCODONE  7.5 mg Oral Q4H PRN Heidi Kwon MD      prochlorperazine  10 mg Oral Q6H PRN Heidi Kwon MD      senna  8.6 mg Oral Daily Heidi Kwon MD          Today, Patient Was Seen By: Efe Garcia DO    **Please Note: This note may have been constructed using a voice recognition system.**

## 2024-06-24 NOTE — CASE MANAGEMENT
Case Management Progress Note    Patient name Greyson Cisneros  Location /401-01 MRN [id number]  : 1928 Date 2024       LOS (days): 2  Geometric Mean LOS (GMLOS) (days): 4.7  Days to GMLOS:2.7        OBJECTIVE:        Current admission status: Inpatient  Preferred Pharmacy:   [id number] - [place], [place] - [address]  [address] [facility] [address]  Phone: [phone] Fax: [phone]    Primary Care Provider: Laila Diaz DO    Primary Insurance: MEDICARE  Secondary Insurance: AETNA    PROGRESS NOTE:per hospice, new referral is needed since he was admitted. Referral placed via aidin.

## 2024-06-24 NOTE — CASE MANAGEMENT
Case Management Progress Note    Patient name Greyson Cisneros  Location /401-01 MRN [id number]  : 1928 Date 2024       LOS (days): 2  Geometric Mean LOS (GMLOS) (days): 4.7  Days to GMLOS:2.8        OBJECTIVE:        Current admission status: Inpatient  Preferred Pharmacy:   [id number] - [place], [place] - [address]  [address] [facility] [address]  Phone: [phone] Fax: [phone]    Primary Care Provider: Laila Diaz DO    Primary Insurance: MEDICARE  Secondary Insurance: AETNA    PROGRESS NOTE:spoke with family at bedside. They are unable to find private hire. They are interested in 5th floor SNF only. Referral placed in aidin.

## 2024-06-24 NOTE — PLAN OF CARE
Problem: Nutrition/Hydration-ADULT  Goal: Nutrient/Hydration intake appropriate for improving, restoring or maintaining nutritional needs  Description: Monitor and assess patient's nutrition/hydration status for malnutrition. Collaborate with interdisciplinary team and initiate plan and interventions as ordered.  Monitor patient's weight and dietary intake as ordered or per policy. Utilize nutrition screening tool and intervene as necessary. Determine patient's food preferences and provide high-protein, high-caloric foods as appropriate.     INTERVENTIONS:  - Monitor oral intake, urinary output, labs, and treatment plans  - Assess nutrition and hydration status and recommend course of action  - Evaluate amount of meals eaten  - Assist patient with eating if necessary   - Allow adequate time for meals  - Recommend/ encourage appropriate diets, oral nutritional supplements, and vitamin/mineral supplements  - Provide specific nutrition/hydration education as appropriate  - Include patient/family/caregiver in decisions related to nutrition  Outcome: Not Progressing      Number Of Freeze-Thaw Cycles: 2 freeze-thaw cycles Include Z78.9 (Other Specified Conditions Influencing Health Status) As An Associated Diagnosis?: Yes Consent: Verbal consent was obtained and risks were reviewed including, but not limited to, scarring, infection, bleeding, scabbing, and incomplete removal Duration Of Freeze Thaw-Cycle (Seconds): 5-10 Render Post-Care Instructions In Note?: no Medical Necessity Information: It is in your best interest to select a reason for this procedure from the list below. All of these items fulfill various CMS LCD requirements except the new and changing color options. Medical Necessity Clause: The following procedure was performed due to the following: Detail Level: Detailed

## 2024-06-24 NOTE — ASSESSMENT & PLAN NOTE
Adult Mental Health Inpatient Program  Interdisciplinary Treatment Plan    John Sue  MRN:[id number]   :1988    3/15/2017    Plan Type: Initial Plan    Your patient, John Sue was seen in the Adult Mental Health Inpatient Program.  Please see below for the patient agreed upon plan, including Goal(s), Focus Indicators, Outcomes and Interventions.      Goals:  Patient Reported Goal #1: Pt is willing to attend group therapy within 48 hrs of admission  Goal #2: Continue to see OP psychiatrist Dayne Lynn, work on relationships with friends and family  Additional Medical Outcomes: Pain  Pain Outcome: Acceptable pain/comfort level is achieved/maintained.             Goal Type:  Pt Reported Goal #1 Type: Short Term Goal  Goal #2 Type: Long Term Goal    Goal Progression:  Goal #1 Progression: Initiated  Goal #2 Progression: Initiated  Pain Outcome Progression: Initiated             Focus Indicators:  Anxiety, Racing Thoughts, Disorganization, Poor Memory/Poor Concentration, Indecision, Worry, Obsessive Thinking, Irritability, Insecurity, Lack of Emotional Support, Social Withdrawl, Negative Thoughts, Decreased Personal Care, Mood Swings, Decreased Energy, Decreased Appetite, Poor Judgement/Impulsive Behavior, Compulsive Behavior, Urges to Use Alcohol/Drugs, Sleep Problems, Physical Pain, Suicidal Ideation    Outcomes:  Patient will attend and participate in therapeutic groups    Interventions:  Psychotherapist to complete Crisis Survival Plan with patient    Patient progress towards goals: Initial Treatment Plan      Argentina Estevez LCSW, [facility]      Behavioral Health Services     Treatment Plan Acknowledgement    John Sue was involved in the treatment plan for this current admission, 3/14/2017.  Patient has seen and agrees to the Interdisciplinary Treatment Plan.  John Sue verbalizes that he/she will work with the treatment team to meet the Interdisciplinary Treatment Plan  Likely leukemoid reaction  Comfort care measures only with hospice care   goals.    X____________________________________    Date/Time: _____________________    Patient/Legally Authorized Representative  X____________________________________    Date/Time: _____________________    Treatment Team Member   X____________________________________    Date/Time: _____________________       X____________________________________    Date/Time: _____________________    Patient/Legally Authorized Representative  X____________________________________    Date/Time: _____________________    Treatment Team Member  X____________________________________    Date/Time: _____________________       X____________________________________    Date/Time: _____________________    Patient/Legally Authorized Representative  X____________________________________    Date/Time: _____________________    Treatment Team Member  X____________________________________    Date/Time: _____________________         I have reviewed and agree with the proposed treatment plan that the treatment team and the patient have defined.    X____________________________________    Date/Time: _____________________    MD Signature (for Inpatient/Residential encounters only)    I was notified of my drug test results.    X____________________________________    Date/Time: _____________________    Patient/Legally Authorized Representative  X____________________________________    Date/Time: _____________________    Patient/Legally Authorized Representative    [id number]

## 2024-06-24 NOTE — ASSESSMENT & PLAN NOTE
This is a 89-year-old male patient who already on hospice care at home, history of pulmonary fibrosis, CHF, BPH, pulmonary hypertension who presents status-post what appears to be a mechanical fall at home.  Per his family, the patient has been noted for decline over the past week, previously only had hospice visits weekly.    Following the fall patient was found to have subdural hematoma and a C7 fracture.    The patient will require increased supports/24-hour care upon discharge.  Family is hoping that they would be able to find staffing to support the patient's needs at home.  Admitted under comfort care measures  Continue symptom management approach with pain medications including oxycodone and morphine as well as Ativan.  The patient was also noted for urinary retention for which a Peña catheter was placed.  Case management input appreciated for discharge planning

## 2024-06-24 NOTE — ASSESSMENT & PLAN NOTE
History of BPH  A Peña catheter was placed in the ED, which will be maintained at this time with the patient being on comfort care measures only status with hospice care.

## 2024-06-24 NOTE — MALNUTRITION/BMI
This medical record reflects one or more clinical indicators suggestive of malnutrition.    Malnutrition Findings:   Adult Malnutrition type: Chronic illness  Adult Degree of Malnutrition: Other severe protein calorie malnutrition  Malnutrition Characteristics: Fat loss, Muscle loss, Inadequate energy                  360 Statement: severe PCM related to prolong inadequate po intake with muscle wasting in temple, clavicle and deltoid regions and loss of subtaneous fat in cheek and orbital regions with suspect weight loss and low BMI.  Treated with diet and trial of supplement.  On hospice at home.    BMI Findings:  Adult BMI Classifications: Underweight < 18.5        Body mass index is 15.97 kg/m².     See Nutrition note dated 6/24/2024 for additional details.  Completed nutrition assessment is viewable in the nutrition documentation.

## 2024-06-25 PROBLEM — I50.33 ACUTE ON CHRONIC DIASTOLIC HEART FAILURE (HCC): Status: ACTIVE | Noted: 2023-01-01

## 2024-06-25 PROBLEM — U07.1 COVID-19 VIRUS INFECTION: Status: ACTIVE | Noted: 2024-01-01

## 2024-06-25 PROBLEM — E43 SEVERE PROTEIN-CALORIE MALNUTRITION (HCC): Status: ACTIVE | Noted: 2024-01-01

## 2024-06-25 NOTE — PROGRESS NOTES
Patient:    MRN:  [id number]    Walter Request ID:  [id number]    Level of care reserved:  Hospice    Partner Reserved:  [facility], [place] [address] [phone]    Clinical needs requested:    Geography searched:  [id number]    Start of Service:    Request sent:  8:20am EDT on 6/25/2024 by Awilda Peralta    Partner reserved:  11:46am EDT on 6/25/2024 by Awilda Peralta    Choice list shared:  11:45am EDT on 6/25/2024 by Awilda Peralta

## 2024-06-25 NOTE — PLAN OF CARE
Problem: Nutrition/Hydration-ADULT  Goal: Nutrient/Hydration intake appropriate for improving, restoring or maintaining nutritional needs  Description: Monitor and assess patient's nutrition/hydration status for malnutrition. Collaborate with interdisciplinary team and initiate plan and interventions as ordered.  Monitor patient's weight and dietary intake as ordered or per policy. Utilize nutrition screening tool and intervene as necessary. Determine patient's food preferences and provide high-protein, high-caloric foods as appropriate.     INTERVENTIONS:  - Monitor oral intake, urinary output, labs, and treatment plans  - Assess nutrition and hydration status and recommend course of action  - Evaluate amount of meals eaten  - Assist patient with eating if necessary   - Allow adequate time for meals  - Recommend/ encourage appropriate diets, oral nutritional supplements, and vitamin/mineral supplements  - Provide specific nutrition/hydration education as appropriate  - Include patient/family/caregiver in decisions related to nutrition  6/25/2024 1128 by Katie Edwards RN  Outcome: Progressing  6/25/2024 1127 by Katie Edwards RN  Outcome: Not Progressing     Problem: PAIN - ADULT  Goal: Verbalizes/displays adequate comfort level or baseline comfort level  Description: Interventions:  - Encourage patient to monitor pain and request assistance  - Assess pain using appropriate pain scale (0-10 pain scale)  - Administer analgesics based on type and severity of pain and evaluate response  - Implement non-pharmacological measures as appropriate and evaluate response  - Consider cultural and social influences on pain and pain management  - Notify physician/advanced practitioner if interventions unsuccessful or patient reports new pain  6/25/2024 1128 by Katie Edwards RN  Outcome: Progressing  6/25/2024 1127 by Katie Edwards RN  Outcome: Not Progressing     Problem: INFECTION - ADULT  Goal: Absence or prevention of  progression during hospitalization  Description: INTERVENTIONS:  - Assess and monitor for signs and symptoms of infection  - Monitor lab/diagnostic results  - Monitor all insertion sites, i.e. indwelling lines  - Administer medications as ordered  - Instruct and encourage patient and family to use good hand hygiene technique  6/25/2024 1128 by Katie Edwards RN  Outcome: Progressing  6/25/2024 1127 by Katie Edwards RN  Outcome: Not Progressing     Problem: SAFETY ADULT  Goal: Patient will remain free of falls  Description: INTERVENTIONS:  - Educate patient/family on patient safety including physical limitations  - Instruct patient to call for assistance with activity   - Consult OT/PT to assist with strengthening/mobility   - Keep Call bell within reach  - Keep bed low and locked with side rails adjusted as appropriate  - Keep care items and personal belongings within reach  - Initiate and maintain comfort rounds  - Make Fall Risk Sign visible to staff (high fall risk)  - Offer Toileting every 1-2 Hours, in advance of need  - Initiate/Maintain bed/chair alarm  - Obtain necessary fall risk management equipment: nonskid footwear  - Apply yellow socks and bracelet for high fall risk patients  - Consider moving patient to room near nurses station  6/25/2024 1128 by Katie Edwards RN  Outcome: Progressing  6/25/2024 1127 by Katie Edwards RN  Outcome: Not Progressing  Goal: Maintain or return to baseline ADL function  Description: INTERVENTIONS:  -  Assess patient's ability to carry out ADLs; (moderate to maximum assist)  - Assess/evaluate cause of self-care deficits (fatigue, limited movement, weakness)  - Assess range of motion  - Assess patient's mobility; develop plan if impaired  - Assess patient's need for assistive devices and provide as appropriate  - Encourage maximum independence but intervene and supervise when necessary  - Involve family in performance of ADLs  - Assess for home care needs following  discharge   - Consider OT consult to assist with ADL evaluation and planning for discharge  - Provide patient education as appropriate  6/25/2024 1128 by Katie Edwards RN  Outcome: Progressing  6/25/2024 1127 by Katie Edwards RN  Outcome: Not Progressing  Goal: Maintains/Returns to pre admission functional level  Description: INTERVENTIONS:  - Perform AM-PAC 6 Click Basic Mobility/ Daily Activity assessment daily.  - Set and communicate daily mobility goal to care team and patient/family/caregiver.   - Collaborate with rehabilitation services on mobility goals if consulted  - Perform Range of Motion 3-4 times a day.  - Reposition patient every 2 hours.  - Ambulate patient 3-4 times a day  - Out of bed to chair 3 times a day   - Out of bed for meals 3 times a day  - Out of bed for toileting  - Record patient progress and toleration of activity level   6/25/2024 1128 by Katie Edwards RN  Outcome: Progressing  6/25/2024 1127 by Katie Edwards RN  Outcome: Not Progressing     Problem: DISCHARGE PLANNING  Goal: Discharge to home or other facility with appropriate resources  Description: INTERVENTIONS:  - Identify barriers to discharge w/patient and caregiver  - Arrange for needed discharge resources and transportation as appropriate  - Identify discharge learning needs (meds, wound care, etc.)  - Refer to Case Management Department for coordinating discharge planning if the patient needs post-hospital services based on physician/advanced practitioner order or complex needs related to functional status, cognitive ability, or social support system  6/25/2024 1128 by Katie Edwards RN  Outcome: Progressing  6/25/2024 1127 by Katie Edwards RN  Outcome: Not Progressing     Problem: Knowledge Deficit  Goal: Patient/family/caregiver demonstrates understanding of disease process, treatment plan, medications, and discharge instructions  Description: Complete learning assessment and assess knowledge base.  Interventions:  -  Provide teaching at level of understanding  - Provide teaching via preferred learning methods  6/25/2024 1128 by Katie Edwards RN  Outcome: Progressing  6/25/2024 1127 by Katie Edwards RN  Outcome: Not Progressing     Problem: SKIN/TISSUE INTEGRITY - ADULT  Goal: Skin Integrity remains intact(Skin Breakdown Prevention)  Description: Assess:  -Perform Nicanor assessment every shift  -Clean and moisturize skin every shift  -Inspect skin when repositioning, toileting, and assisting with ADLS  -Assess under medical devices every shift  -Assess extremities for adequate circulation and sensation     Bed Management:  -Have minimal linens on bed & keep smooth, unwrinkled  -Change linens as needed when moist or perspiring  -Avoid sitting or lying in one position for more than 2 hours while in bed  -Keep HOB at 30-45 degrees     Toileting:  -Offer bedside commode  -Assess for incontinence every 1-2 hours and prn  -Use incontinent care products after each incontinent episode    Activity:  -Mobilize patient 3-4 times a day  -Encourage activity and walks on unit  -Encourage or provide ROM exercises   -Turn and reposition patient every 2 Hours  -Use appropriate equipment to lift or move patient in bed  -Instruct/ Assist with weight shifting every 2 hours when out of bed in chair  -Consider limitation of chair time 3-4 hour intervals    Skin Care:  -Avoid use of baby powder, tape, friction and shearing, hot water or constrictive clothing  -Relieve pressure over bony prominences  -Do not massage red bony areas    Next Steps:  -Teach patient strategies to minimize risks    -Consider consults to  interdisciplinary teams   6/25/2024 1128 by Katie Edwards RN  Outcome: Progressing  6/25/2024 1127 by Katie Edwards RN  Outcome: Not Progressing  Goal: Incision(s), wounds(s) or drain site(s) healing without S/S of infection  Description: INTERVENTIONS  - Assess and document dressing, incision, wound bed, drain sites and surrounding  tissue  - Provide patient and family education  - Perform skin care/dressing changes as needed/ordered  6/25/2024 1128 by Katie Edwards RN  Outcome: Progressing  6/25/2024 1127 by Katie Edwards RN  Outcome: Not Progressing     Problem: GENITOURINARY - ADULT  Goal: Maintains or returns to baseline urinary function  Description: INTERVENTIONS:  - Assess urinary function  - Encourage oral fluids to ensure adequate hydration if ordered  - Administer IV fluids as ordered to ensure adequate hydration  - Administer ordered medications as needed  - Offer frequent toileting  - Follow urinary retention protocol if ordered  6/25/2024 1128 by Katie Edwards RN  Outcome: Progressing  6/25/2024 1127 by Katie Edwards RN  Outcome: Not Progressing  Goal: Urinary catheter remains patent  Description: INTERVENTIONS:  - Assess patency of urinary catheter  - If patient has a chronic guerrero, consider changing catheter if non-functioning  - Follow guidelines for intermittent irrigation of non-functioning urinary catheter  6/25/2024 1128 by Katie Edwards RN  Outcome: Progressing  6/25/2024 1127 by Katie Edwards RN  Outcome: Not Progressing     Problem: Prexisting or High Potential for Compromised Skin Integrity  Goal: Skin integrity is maintained or improved  Description: INTERVENTIONS:  - Identify patients at risk for skin breakdown  - Assess and monitor skin integrity  - Assess and monitor nutrition and hydration status  - Monitor labs   - Assess for incontinence   - Turn and reposition patient  - Assist with mobility/ambulation  - Relieve pressure over bony prominences  - Avoid friction and shearing  - Provide appropriate hygiene as needed including keeping skin clean and dry  - Evaluate need for skin moisturizer/barrier cream  - Collaborate with interdisciplinary team   - Patient/family teaching  - Consider wound care consult   6/25/2024 1128 by Katie Edwards RN  Outcome: Progressing  6/25/2024 1127 by Katie Edwards RN  Outcome:  Not Progressing

## 2024-06-25 NOTE — ASSESSMENT & PLAN NOTE
Patient presented with urinary retention requiring Peña catheter placement, which will be maintained with the patient being comfort care measures only  Hold Cardura for now with intermittent hypotension

## 2024-06-25 NOTE — ASSESSMENT & PLAN NOTE
Wt Readings from Last 3 Encounters:   06/22/24 59.5 kg (131 lb 3.2 oz)   04/30/24 66.2 kg (146 lb)   04/08/24 66.2 kg (146 lb)     Diuretics can be used PRN for comfort  Comfort care measures only

## 2024-06-25 NOTE — ASSESSMENT & PLAN NOTE
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 Recommended follow-up in 12 months.   Findings noted, however limited clinical significance given that the patient is on comfort care measures only

## 2024-06-25 NOTE — ASSESSMENT & PLAN NOTE
"S/P mechanical fall  Comfort care measures only  I appreciate Neurosurgery's input:  \"Contacted by Dr. Garcia at [facility] in regard to this pt and if a cervical collar would be indicated.  He presented to the ER on 6/22/2024 after a witnessed fall at home.  He has been on home hospice but has had a more rapid decline in the last week after this fall.  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  Patient's family continue to maintain wishes for comfort measures only/hospice care.  It appears they brought him to the ER to help with coordinating inpatient hospice given the patient's decline and need for increased support/24-hour care.  Primary team messaged to inquire if a cervical collar would be indicated.     Given that the patient is comfort measures only/hospice and does not want to pursue any further surgical intervention or medical management, no strict cervical collar is required.  No further imaging, close neuromonitoring, or serial imaging with outpatient follow-up is required in regards to these injuries.     Patient may wear a soft collar for comfort should that be helpful to him.  Otherwise will defer further management to the primary team and ongoing coordination for inpatient hospice services.     Neurosurgery will sign off at this time.  Please reach out with any further questions or concerns.  As mentioned above, no further follow-up in this regard is required given CMO.\"     "

## 2024-06-25 NOTE — ASSESSMENT & PLAN NOTE
Malnutrition Findings:   Adult Malnutrition type: Chronic illness  Adult Degree of Malnutrition: Other severe protein calorie malnutrition  Malnutrition Characteristics: Fat loss, Muscle loss, Inadequate energy                  360 Statement: severe PCM related to prolong inadequate po intake with muscle wasting in temple, clavicle and deltoid regions and loss of subtaneous fat in cheek and orbital regions with suspect weight loss and low BMI.  Treated with diet and trial of supplement.  On hospice at home.    BMI Findings:  Adult BMI Classifications: Underweight < 18.5        Body mass index is 15.97 kg/m².     Continue nutritional supplements per the dietitian's recommendations

## 2024-06-25 NOTE — CASE MANAGEMENT
Case Management Progress Note    Patient name Greyson Cisneros  Location /401-01 MRN [id number]  : 1928 Date 2024       LOS (days): 3  Geometric Mean LOS (GMLOS) (days): 4.7  Days to GMLOS:1.8        OBJECTIVE:        Current admission status: Inpatient  Preferred Pharmacy:   [id number] - [place], [place] - [address]  [address] [address]  Phone: [phone] Fax: [phone]    Primary Care Provider: Laila Diaz DO    Primary Insurance: MEDICARE  Secondary Insurance: [employer]    PROGRESS NOTE:patient accepted on 5th floor SNF for placement with [facility] hospice to resume tomorrow. Pt had covid swab and tested positive. Pt will need to remain in isolation on comfort measures until SNF can accommodate a private room. Family will only agree to [facility] for placement. Provider and hospice aware.

## 2024-06-25 NOTE — ASSESSMENT & PLAN NOTE
History of BPH  A Peña catheter was placed in the ED, which will be maintained at this time with the patient being on comfort care measures only status.

## 2024-06-25 NOTE — ASSESSMENT & PLAN NOTE
"S/P mechanical fall  Comfort care measures only  I appreciate Neurosurgery's input:  \"Contacted by Dr. Garcia at [facility] in regard to this pt and if a cervical collar would be indicated.  He presented to the ER on 6/22/2024 after a witnessed fall at home.  He has been on home hospice but has had a more rapid decline in the last week after this fall.  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  Patient's family continue to maintain wishes for comfort measures only/hospice care.  It appears they brought him to the ER to help with coordinating inpatient hospice given the patient's decline and need for increased support/24-hour care.  Primary team messaged to inquire if a cervical collar would be indicated.     Given that the patient is comfort measures only/hospice and does not want to pursue any further surgical intervention or medical management, no strict cervical collar is required.  No further imaging, close neuromonitoring, or serial imaging with outpatient follow-up is required in regards to these injuries.     Patient may wear a soft collar for comfort should that be helpful to him.  Otherwise will defer further management to the primary team and ongoing coordination for inpatient hospice services.     Neurosurgery will sign off at this time.  Please reach out with any further questions or concerns.  As mentioned above, no further follow-up in this regard is required given CMO.\"   "

## 2024-06-25 NOTE — ASSESSMENT & PLAN NOTE
Multifactorial with history of pulmonary fibrosis, pulmonary hypertension, kyphosis  Utilize supplemental oxygen as needed to maintain oxygen saturation levels at 90% and above and PRN comfort

## 2024-06-25 NOTE — ASSESSMENT & PLAN NOTE
This is a 89-year-old male patient who already on hospice care at home, history of pulmonary fibrosis, CHF, BPH, pulmonary hypertension who presents status-post what appears to be a mechanical fall at home.  Per his family, the patient has been noted for decline over the past week, previously only had hospice visits weekly.    Following the fall patient was found to have subdural hematoma and a C7 fracture/spinal cord injury.    The patient will require increased supports/24-hour care upon discharge.  Family is hoping that they would be able to find staffing to support the patient's needs at home.  Admitted under comfort care measures  Continue symptom management approach with pain medications including oxycodone and morphine as well as Ativan.  The patient was also noted for urinary retention for which a Peña catheter was placed.  Case management input appreciated for discharge planning  Discharge to [facility] 5th floor SNF for comfort care measures only

## 2024-06-25 NOTE — DISCHARGE SUMMARY
"[facility]  Discharge- Greyson Cisneros 5/14/1928, 89 y.o. male MRN: [id number]  Unit/Bed#: 401-01 Encounter: [id number]  Primary Care Provider: Laila Diaz DO   Date and time admitted to hospital: 6/22/2024 12:25 PM    * Comfort measures only status  Assessment & Plan  This is a 89-year-old male patient who already on hospice care at home, history of pulmonary fibrosis, CHF, BPH, pulmonary hypertension who presents status-post what appears to be a mechanical fall at home.  Per his family, the patient has been noted for decline over the past week, previously only had hospice visits weekly.    Following the fall patient was found to have subdural hematoma and a C7 fracture/spinal cord injury.    The patient will require increased supports/24-hour care upon discharge.  Family is hoping that they would be able to find staffing to support the patient's needs at home.  Admitted under comfort care measures  Continue symptom management approach with pain medications including oxycodone and morphine as well as Ativan.  The patient was also noted for urinary retention for which a Peña catheter was placed.  Case management input appreciated for discharge planning  Discharge to [facility] 5th floor SNF for comfort care measures only    COVID-19 virus infection  Assessment & Plan  Comfort care measures only    Closed fracture of seventh cervical vertebra (HCC)  Assessment & Plan  S/P mechanical fall  Comfort care measures only  I appreciate Neurosurgery's input:  \"Contacted by Dr. Garcia at [facility] in regard to this pt and if a cervical collar would be indicated.  He presented to the ER on 6/22/2024 after a witnessed fall at home.  He has been on home hospice but has had a more rapid decline in the last week after this fall.  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  Patient's family continue to " "maintain wishes for comfort measures only/hospice care.  It appears they brought him to the ER to help with coordinating inpatient hospice given the patient's decline and need for increased support/24-hour care.  Primary team messaged to inquire if a cervical collar would be indicated.     Given that the patient is comfort measures only/hospice and does not want to pursue any further surgical intervention or medical management, no strict cervical collar is required.  No further imaging, close neuromonitoring, or serial imaging with outpatient follow-up is required in regards to these injuries.     Patient may wear a soft collar for comfort should that be helpful to him.  Otherwise will defer further management to the primary team and ongoing coordination for inpatient hospice services.     Neurosurgery will sign off at this time.  Please reach out with any further questions or concerns.  As mentioned above, no further follow-up in this regard is required given CMO.\"       Subdural hematoma (HCC)  Assessment & Plan  S/P mechanical fall  Comfort care measures only  I appreciate Neurosurgery's input:  \"Contacted by Dr. Garcia at [facility] in regard to this pt and if a cervical collar would be indicated.  He presented to the ER on 6/22/2024 after a witnessed fall at home.  He has been on home hospice but has had a more rapid decline in the last week after this fall.  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  Patient's family continue to maintain wishes for comfort measures only/hospice care.  It appears they brought him to the ER to help with coordinating inpatient hospice given the patient's decline and need for increased support/24-hour care.  Primary team messaged to inquire if a cervical collar would be indicated.     Given that the patient is comfort measures only/hospice and does not want to pursue any further surgical intervention or medical " "management, no strict cervical collar is required.  No further imaging, close neuromonitoring, or serial imaging with outpatient follow-up is required in regards to these injuries.     Patient may wear a soft collar for comfort should that be helpful to him.  Otherwise will defer further management to the primary team and ongoing coordination for inpatient hospice services.     Neurosurgery will sign off at this time.  Please reach out with any further questions or concerns.  As mentioned above, no further follow-up in this regard is required given CMO.\"     Severe protein-calorie malnutrition (HCC)  Assessment & Plan  Malnutrition Findings:   Adult Malnutrition type: Chronic illness  Adult Degree of Malnutrition: Other severe protein calorie malnutrition  Malnutrition Characteristics: Fat loss, Muscle loss, Inadequate energy                  360 Statement: severe PCM related to prolong inadequate po intake with muscle wasting in temple, clavicle and deltoid regions and loss of subtaneous fat in cheek and orbital regions with suspect weight loss and low BMI.  Treated with diet and trial of supplement.  On hospice at home.    BMI Findings:  Adult BMI Classifications: Underweight < 18.5        Body mass index is 15.97 kg/m².     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 Recommended follow-up in 12 months.   Findings noted, however limited clinical significance given that the patient is on comfort care measures only    Hypoxia  Assessment & Plan  Multifactorial with history of pulmonary fibrosis, pulmonary hypertension, kyphosis  Utilize supplemental oxygen as needed to maintain oxygen saturation levels at 90% and above and PRN comfort    Esophageal thickening  Assessment & Plan  New finding on CT  Does not report GI " symptoms  Comfort care measures only    Urinary retention  Assessment & Plan  History of BPH  A Peña catheter was placed in the ED, which will be maintained at this time with the patient being on comfort care measures only status.    Acute on chronic diastolic heart failure (HCC)  Assessment & Plan  Wt Readings from Last 3 Encounters:   06/22/24 59.5 kg (131 lb 3.2 oz)   04/30/24 66.2 kg (146 lb)   04/08/24 66.2 kg (146 lb)     Diuretics can be used PRN for comfort  Comfort care measures only          Constipation  Assessment & Plan  Continue the current bowel regimen    Presence of permanent cardiac pacemaker  Assessment & Plan  Noted    Permanent atrial fibrillation (HCC)  Assessment & Plan  Not on AV-esequiel blocking medications or anticoagulation  Comfort care measures only    Pulmonary artery hypertension (HCC)  Assessment & Plan  Supplemental oxygen as needed for comfort    Benign prostatic hyperplasia  Assessment & Plan  Patient presented with urinary retention requiring Peña catheter placement, which will be maintained with the patient being comfort care measures only  Hold Cardura for now with intermittent hypotension      Discharging Physician / Practitioner: Efe Garcia DO  PCP: Laila Diaz DO  Admission Date:   Admission Orders (From admission, onward)       Ordered        06/22/24 1510  INPATIENT ADMISSION  Once                          Discharge Date: 06/25/24    Consultations During Hospital Stay:  None    Procedures Performed:   None    Significant Findings / Test Results:   XR hand 3+ views RIGHT    Result Date: 6/22/2024  Impression: No acute osseous abnormality. No radiopaque foreign body or gas in the soft tissues appreciated. Chronic findings as above, similar to the prior study. Workstation performed: [id number]     TRAUMA - CT spine cervical wo contrast    Result Date: 6/22/2024  Impression: Findings suggestive of hyperextension injury at C6-C7, with fracture of C7 anterior  osteophyte, and anterior widening of C6-C7 disc space, as above. This is suggestive of anterior longitudinal ligament injury. Acute nondisplaced fracture of the spinous process of C6. Mild anterior wedge deformity of T1, age indeterminate, not evident in 2023. No retropulsion. Degenerative changes, similar to prior study. Likely secondary minimal spondylolisthesis, similar to prior study. I personally discussed this study with Bg Feliz on 6/22/2024 1:54 PM. Workstation performed: [id number]     TRAUMA - CT head wo contrast    Result Date: 6/22/2024  Impression: Left supratentorial acute subdural hematoma, up to 4 mm. No significant mass effect. No other foci of intracranial hemorrhage. Unchanged chronic intracranial findings, as above. Motion degraded evaluation of the facial bones and orbits. Secretions in the right frontal, bilateral ethmoid, left maxillary sinuses. If history of direct facial trauma, consider dedicated facial bone CT. Limited evaluation of the orbits. No obvious acute intraconal injury. I personally discussed this study with Bg Feliz on 6/22/2024 1:54 PM. Workstation performed: [id number]     TRAUMA - CT chest abdomen pelvis w contrast    Result Date: 6/22/2024  Impression: Redemonstrated pulmonary fibrosis with bibasal predominance. Hypoventilatory changes with likely secondary inhomogeneous attenuation of the lungs with areas of faint groundglass opacity. Suspected round atelectasis in the posterior inferior right lower lobe. Bilateral pleural effusions, small, with some fissural entrapment. 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 Recommend follow-up in 12 months. Esophageal thickening, new since prior study. No acute intra-abdominal pathology. Age indeterminate anterior wedge deformity of T1, moderate compression fracture of T8, moderate compression fracture of L2, deformity of the right inferior pubic ramus, not evident on prior studies. They may be nonacute, correlation with  focal tenderness  is advised. Overdistended urinary bladder, likely due to chronic outlet obstruction. If the patient cannot void, consider catheterization. Prostatomegaly, stable in size since 2022. Numerous chronic findings, similar to prior studies, as above. I personally discussed this study with Bg Feliz on 6/22/2024 1:54 PM. Workstation performed: [id number]      Results from last 7 days   Lab Units 06/22/24  1242   WBC Thousand/uL 64.82*   HEMOGLOBIN g/dL 9.6*   HEMATOCRIT % 31.4*   PLATELETS Thousands/uL 35*     Results from last 7 days   Lab Units 06/22/24  1242   SODIUM mmol/L 138   POTASSIUM mmol/L 4.6   CHLORIDE mmol/L 103   CO2 mmol/L 21   BUN mg/dL 39*   CREATININE mg/dL 1.53*   CALCIUM mg/dL 10.0         Results from last 7 days   Lab Units 06/22/24  1242   ALK PHOS U/L 49   ALT U/L 10   AST U/L 28     Microbiology Results (last 21 days)    Collected Updated Procedure Result Status Patient Facility Result Comment    06/25/2024 1219 06/25/2024 1226 MRSA culture [[id number]]   Nares from Nose    In process [facility]  Component Value   No component results             06/25/2024 1219 06/25/2024 1314 COVID19, Influenza A/B, RSV PCR, [facility] [[id number]]    (Abnormal)   Nares from Nasopharyngeal Swab    Final result [facility] - copy/paste COVID Guidelines URL to browser: [email or web address]   SARS-CoV-2 assay is a Nucleic Acid Amplification assay intended for the   qualitative detection of nucleic acid from SARS-CoV-2 in nasopharyngeal   swabs. Results are for the presumptive identification of SARS-CoV-2 RNA.   Positive results are indicative of infection with SARS-CoV-2, the virus   causing COVID-19, but do not rule out bacterial infection or co-infection   with other viruses. Laboratories within the [place] and its   territories are required to report all positive  results to the appropriate   public health authorities. Negative results do not preclude SARS-CoV-2   infection and should not be used as the sole basis for treatment or other   patient management decisions. Negative results must be combined with   clinical observations, patient history, and epidemiological information.   This test has not been FDA cleared or approved.   This test has been authorized by FDA under an Emergency Use Authorization   (EUA). This test is only authorized for the duration of time the   declaration that circumstances exist justifying the authorization of the   emergency use of an in vitro diagnostic tests for detection of SARS-CoV-2   virus and/or diagnosis of COVID-19 infection under section 564(b)(1) of   the Act, 21 U.S.C. 360bbb-3(b)(1), unless the authorization is terminated   or revoked sooner. The test has been validated but independent review by FDA   and CLIA is pending.   Test performed using Cepheid GeneXpert: This RT-PCR assay targets N2,   a region unique to SARS-CoV-2. A conserved region in the E-gene was chosen   for pan-Sarbecovirus detection which includes SARS-CoV-2.   According to CMS-2020-01-R, this platform meets the definition of high-throughput technology.    Component Value   SARS-CoV-2 Positive Abnormal    INFLUENZA A PCR Negative   INFLUENZA B PCR Negative   RSV PCR Negative          Incidental Findings:   As above      Complications:  None    Reason for Admission:  Mechanical fall resulting in a subdural hematoma and fracture of the C7 anterior osteophyte    Hospital Course:   Greyson Cisneros is a 89 y.o. male patient who originally presented to the hospital on 6/22/2024 after experiencing a mechanical fall at home.    Please see above list of diagnoses and related plan for additional information.     Condition at Discharge: terminal    Discharge Day Visit / Exam:   Subjective:  The patient was seen and examined.  The patient is experiencing intermittent posterior  "neck pain.  No chest pain.  No shortness of breath.  No abdominal pain.  No nausea or vomiting.    Vitals: Blood Pressure: (!) 131/46 (06/24/24 0705)  Pulse: 68 (06/25/24 0843)  Temperature: (!) 97.2 °F (36.2 °C) (06/24/24 1544)  Temp Source: Oral (06/24/24 1544)  Respirations: (!) 26 (06/25/24 0843)  Height: 6' 4\" (193 cm) (06/22/24 1537)  Weight - Scale: 59.5 kg (131 lb 3.2 oz) (06/22/24 1537)  SpO2: 93 % (06/24/24 2321)  Exam:   Physical Exam  General:  NAD, follows commands  HEENT:  NC/AT, mucous membranes moist  Neck:  Supple, No JVP elevation  CV:  + S1, + S2, RRR  Pulm:  Lung fields are CTA bilaterally  Abd:  Soft, Non-tender, Non-distended  Ext:  No clubbing/cyanosis/edema  Skin:  No rashes  Neuro:  Awake, confused at times, not oriented  Psych:  Normal mood and affect      Discussion with Family: Updated  (daughter) via phone. (Krysten)    Discharge instructions/Information to patient and family:  See after visit summary for information provided to patient and family.      Provisions for Follow-Up Care:  See after visit summary for information related to follow-up care and any pertinent home health orders.      Mobility at time of Discharge:   Basic Mobility Inpatient Raw Score: 10  -HLM Goal: 4: Move to chair/commode  JH-HLM Achieved: 2: Bed activities/Dependent transfer  HLM Goal NOT achieved. Continue to encourage mobility in post discharge setting.     Disposition:   Other Skilled Nursing Facility at [facility] 5th floor SNF     Discharge Statement:  I spent 45 minutes discharging the patient. This time was spent on the day of discharge. I had direct contact with the patient on the day of discharge. Greater than 50% of the total time was spent examining patient, answering all patient questions, arranging and discussing plan of care with patient as well as directly providing post-discharge instructions.  Additional time then spent on discharge " activities.    Discharge Medications:  See after visit summary for reconciled discharge medications provided to patient and/or family.      **Please Note: This note may have been constructed using a voice recognition system**

## 2024-06-25 NOTE — PROGRESS NOTES
Patient:    MRN:  [id number]    Walter Request ID:  [id number]    Level of care reserved:  Home Health Agency    Partner Reserved:  [facility], [place] [address] [phone]    Clinical needs requested:    Geography searched:  [id number]    Start of Service:    Request sent:  2:59pm EDT on 6/24/2024 by Awilda Peralta    Partner reserved:  8:16am EDT on 6/25/2024 by Awilda Peralta    Choice list shared:  8:16am EDT on 6/25/2024 by Awilda Peralta

## 2024-06-25 NOTE — PROGRESS NOTES
Contacted by Dr. Garcia at [facility] in regard to this pt and if a cervical collar would be indicated.  He presented to the ER on 6/22/2024 after a witnessed fall at home.  He has been on home hospice but has had a more rapid decline in the last week after this fall.  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  Patient's family continue to maintain wishes for comfort measures only/hospice care.  It appears they brought him to the ER to help with coordinating inpatient hospice given the patient's decline and need for increased support/24-hour care.  Primary team messaged to inquire if a cervical collar would be indicated.    Given that the patient is comfort measures only/hospice and does not want to pursue any further surgical intervention or medical management, no strict cervical collar is required.  No further imaging, close neuromonitoring, or serial imaging with outpatient follow-up is required in regards to these injuries.    Patient may wear a soft collar for comfort should that be helpful to him.  Otherwise will defer further management to the primary team and ongoing coordination for inpatient hospice services.    Neurosurgery will sign off at this time.  Please reach out with any further questions or concerns.  As mentioned above, no further follow-up in this regard is required given CMO.

## 2024-06-25 NOTE — HOSPICE NOTE
Hospice referral received yesterday afternoon.  Patient will require a F2F with hospice physician since he will be entering benefit period #4.  Tentative plans for Dr Nazario to see him tomorrow morning.  Liaison reached out to daughter Krysten via telephone. She was in route to [facility] to finalize paperwork. She verbalized her support of hospice involvement again.  Liaison will keep CM updated on when our hospice can re-admit to services.  At this point, looking like resumption of care tomorrow 6/26/24.

## 2024-06-26 NOTE — ASSESSMENT & PLAN NOTE
This is a 89-year-old male patient who already on hospice care at home, history of pulmonary fibrosis, CHF, BPH, pulmonary hypertension who presents status-post what appears to be a mechanical fall at home.  Per his family, the patient has been noted for decline over the past week, previously only had hospice visits weekly.    Following the fall patient was found to have subdural hematoma and a C7 fracture/spinal cord injury.    The patient will require increased supports/24-hour care upon discharge.  Family is hoping that they would be able to find staffing to support the patient's needs at home.  Admitted under comfort care measures  Continue symptom management approach with pain medications including oxycodone and morphine as well as Ativan.  The patient was also noted for urinary retention for which a Peña catheter was placed.  Case management input appreciated for discharge planning  Plan to discharge to [facility] 5th floor SNF for comfort care measures only

## 2024-06-26 NOTE — ASSESSMENT & PLAN NOTE
"S/P mechanical fall  Comfort care measures only  I appreciate Neurosurgery's input:  \"Contacted by Dr. Garcia at [facility] in regard to this pt and if a cervical collar would be indicated.  He presented to the ER on 6/22/2024 after a witnessed fall at home.  He has been on home hospice but has had a more rapid decline in the last week after this fall.  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  Patient's family continue to maintain wishes for comfort measures only/hospice care.  It appears they brought him to the ER to help with coordinating inpatient hospice given the patient's decline and need for increased support/24-hour care.  Primary team messaged to inquire if a cervical collar would be indicated.     Given that the patient is comfort measures only/hospice and does not want to pursue any further surgical intervention or medical management, no strict cervical collar is required.  No further imaging, close neuromonitoring, or serial imaging with outpatient follow-up is required in regards to these injuries.     Patient may wear a soft collar for comfort should that be helpful to him.  Otherwise will defer further management to the primary team and ongoing coordination for inpatient hospice services.     Neurosurgery will sign off at this time.  Please reach out with any further questions or concerns.  As mentioned above, no further follow-up in this regard is required given CMO.\"     "

## 2024-06-26 NOTE — ASSESSMENT & PLAN NOTE
"S/P mechanical fall  Comfort care measures only  I appreciate Neurosurgery's input:  \"Contacted by Dr. Garcia at [facility] in regard to this pt and if a cervical collar would be indicated.  He presented to the ER on 6/22/2024 after a witnessed fall at home.  He has been on home hospice but has had a more rapid decline in the last week after this fall.  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  Patient's family continue to maintain wishes for comfort measures only/hospice care.  It appears they brought him to the ER to help with coordinating inpatient hospice given the patient's decline and need for increased support/24-hour care.  Primary team messaged to inquire if a cervical collar would be indicated.     Given that the patient is comfort measures only/hospice and does not want to pursue any further surgical intervention or medical management, no strict cervical collar is required.  No further imaging, close neuromonitoring, or serial imaging with outpatient follow-up is required in regards to these injuries.     Patient may wear a soft collar for comfort should that be helpful to him.  Otherwise will defer further management to the primary team and ongoing coordination for inpatient hospice services.     Neurosurgery will sign off at this time.  Please reach out with any further questions or concerns.  As mentioned above, no further follow-up in this regard is required given CMO.\"   "

## 2024-06-26 NOTE — CASE MANAGEMENT
Case Management Progress Note    Patient name Greyson Cisneros  Location /401-01 MRN [id number]  : 1928 Date 2024       LOS (days): 4  Geometric Mean LOS (GMLOS) (days): 4.7  Days to GMLOS:0.8        OBJECTIVE:        Current admission status: Inpatient  Preferred Pharmacy:   [id number] - [place], [place] - [address]  [address] [address]  Phone: [phone] Fax: [phone]    Primary Care Provider: Laila Diaz DO    Primary Insurance: MEDICARE  Secondary Insurance: [employer]    PROGRESS NOTE:patient discussed at interdisciplinary meeting to include Higinio from 5th floor. Still no available private room for covid isolation. Possible one on Friday. Pt will remain comfort measures in hospital as family can not care for at home and will only agree to 5th floor. Hospice liaison updated on same via epic chat. Spoke with daughters in waiting room.

## 2024-06-26 NOTE — PROGRESS NOTES
"[facility]  Progress Note  Name: Greyson Cisneros I  MRN: [id number]  Unit/Bed#: 401-01 I Date of Admission: 6/22/2024   Date of Service: 6/26/2024 I Hospital Day: 4    Assessment & Plan   * Comfort measures only status  Assessment & Plan  This is a 89-year-old male patient who already on hospice care at home, history of pulmonary fibrosis, CHF, BPH, pulmonary hypertension who presents status-post what appears to be a mechanical fall at home.  Per his family, the patient has been noted for decline over the past week, previously only had hospice visits weekly.    Following the fall patient was found to have subdural hematoma and a C7 fracture/spinal cord injury.    The patient will require increased supports/24-hour care upon discharge.  Family is hoping that they would be able to find staffing to support the patient's needs at home.  Admitted under comfort care measures  Continue symptom management approach with pain medications including oxycodone and morphine as well as Ativan.  The patient was also noted for urinary retention for which a Peña catheter was placed.  Case management input appreciated for discharge planning  Plan to discharge to [facility] 5th floor SNF for comfort care measures only    COVID-19 virus infection  Assessment & Plan  Comfort care measures only    Closed fracture of seventh cervical vertebra (HCC)  Assessment & Plan  S/P mechanical fall  Comfort care measures only  I appreciate Neurosurgery's input:  \"Contacted by Dr. Garcia at [facility] in regard to this pt and if a cervical collar would be indicated.  He presented to the ER on 6/22/2024 after a witnessed fall at home.  He has been on home hospice but has had a more rapid decline in the last week after this fall.  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  Patient's family continue to maintain wishes for comfort " "measures only/hospice care.  It appears they brought him to the ER to help with coordinating inpatient hospice given the patient's decline and need for increased support/24-hour care.  Primary team messaged to inquire if a cervical collar would be indicated.     Given that the patient is comfort measures only/hospice and does not want to pursue any further surgical intervention or medical management, no strict cervical collar is required.  No further imaging, close neuromonitoring, or serial imaging with outpatient follow-up is required in regards to these injuries.     Patient may wear a soft collar for comfort should that be helpful to him.  Otherwise will defer further management to the primary team and ongoing coordination for inpatient hospice services.     Neurosurgery will sign off at this time.  Please reach out with any further questions or concerns.  As mentioned above, no further follow-up in this regard is required given CMO.\"       Subdural hematoma (HCC)  Assessment & Plan  S/P mechanical fall  Comfort care measures only  I appreciate Neurosurgery's input:  \"Contacted by Dr. Garcia at [facility] in regard to this pt and if a cervical collar would be indicated.  He presented to the ER on 6/22/2024 after a witnessed fall at home.  He has been on home hospice but has had a more rapid decline in the last week after this fall.  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  Patient's family continue to maintain wishes for comfort measures only/hospice care.  It appears they brought him to the ER to help with coordinating inpatient hospice given the patient's decline and need for increased support/24-hour care.  Primary team messaged to inquire if a cervical collar would be indicated.     Given that the patient is comfort measures only/hospice and does not want to pursue any further surgical intervention or medical management, no strict " "cervical collar is required.  No further imaging, close neuromonitoring, or serial imaging with outpatient follow-up is required in regards to these injuries.     Patient may wear a soft collar for comfort should that be helpful to him.  Otherwise will defer further management to the primary team and ongoing coordination for inpatient hospice services.     Neurosurgery will sign off at this time.  Please reach out with any further questions or concerns.  As mentioned above, no further follow-up in this regard is required given CMO.\"     Severe protein-calorie malnutrition (HCC)  Assessment & Plan  Malnutrition Findings:   Adult Malnutrition type: Chronic illness  Adult Degree of Malnutrition: Other severe protein calorie malnutrition  Malnutrition Characteristics: Fat loss, Muscle loss, Inadequate energy                  360 Statement: severe PCM related to prolong inadequate po intake with muscle wasting in temple, clavicle and deltoid regions and loss of subtaneous fat in cheek and orbital regions with suspect weight loss and low BMI.  Treated with diet and trial of supplement.  On hospice at home.    BMI Findings:  Adult BMI Classifications: Underweight < 18.5        Body mass index is 15.97 kg/m².     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 Recommended follow-up in 12 months.   Findings noted, however limited clinical significance given that the patient is on comfort care measures only    Hypoxia  Assessment & Plan  Multifactorial with history of pulmonary fibrosis, pulmonary hypertension, kyphosis, and the COVID-19 virus infection  Utilize supplemental oxygen as needed to maintain oxygen saturation levels at 90% and above and PRN comfort    Esophageal thickening  Assessment & Plan  New finding on CT  Does not " report GI symptoms  Comfort care measures only    Urinary retention  Assessment & Plan  History of BPH  A Peña catheter was placed in the ED, which will be maintained at this time with the patient being on comfort care measures only status.    Acute on chronic diastolic heart failure (HCC)  Assessment & Plan  Wt Readings from Last 3 Encounters:   06/22/24 59.5 kg (131 lb 3.2 oz)   04/30/24 66.2 kg (146 lb)   04/08/24 66.2 kg (146 lb)     Diuretics can be used PRN for comfort  Comfort care measures only          Constipation  Assessment & Plan  Continue the current bowel regimen    Presence of permanent cardiac pacemaker  Assessment & Plan  Noted    Permanent atrial fibrillation (HCC)  Assessment & Plan  Not on AV-esequiel blocking medications or anticoagulation  Comfort care measures only    Pulmonary artery hypertension (HCC)  Assessment & Plan  Supplemental oxygen as needed for comfort    Benign prostatic hyperplasia  Assessment & Plan  Patient presented with urinary retention requiring Peña catheter placement, which will be maintained with the patient being comfort care measures only  Hold Cardura for now with intermittent hypotension        VTE Pharmacologic Prophylaxis: VTE Score: 13  Contraindicated due to Comfort Measures Only    Mobility:   Basic Mobility Inpatient Raw Score: 10  JH-HLM Goal: 4: Move to chair/commode  JH-HLM Achieved: 4: Move to chair/commode  JH-HLM Goal achieved. Continue to encourage appropriate mobility.    Patient Centered Rounds: I performed bedside rounds with nursing staff today.     Education and Discussions with Family / Patient: Updated  (daughter) via phone. (Krysten)    Total Time Spent on Date of Encounter in care of patient: 25 mins. This time was spent on one or more of the following: performing physical exam; counseling and coordination of care; obtaining or reviewing history; documenting in the medical record; reviewing/ordering tests, medications or procedures;  communicating with other healthcare professionals and discussing with patient's family/caregivers.    Current Length of Stay: 4 day(s)  Current Patient Status: Inpatient   Certification Statement: The patient will continue to require additional inpatient hospital stay due to awaiting SNF placement.  Discharge Plan: Anticipate discharge in 24-48 hrs to SNF    Code Status: Level 4 - Comfort Care    Subjective:   The patient was seen and examined.  The patient is experiencing some posterior neck pain this morning.  No chest pain.  No shortness of breath.  No abdominal pain.  No nausea or vomiting.      Objective:     Vitals:   No data recorded.    HR:  [56-68] 64  Resp:  [23-30] 24  Body mass index is 15.97 kg/m².     Input and Output Summary (last 24 hours):     Intake/Output Summary (Last 24 hours) at 6/26/2024 1007  Last data filed at 6/25/2024 1641  Gross per 24 hour   Intake 240 ml   Output --   Net 240 ml       Physical Exam:   Physical Exam  General:  NAD, follows commands  HEENT:  NC/AT, mucous membranes moist  Neck:  Supple, No JVP elevation  CV:  + S1, + S2, RRR  Pulm:  Lung fields are CTA bilaterally  Abd:  Soft, Non-tender, Non-distended  Ext:  No clubbing/cyanosis/edema  Skin:  No rashes  Neuro:  Awake, alert, confused, not oriented  Psych:  Normal mood and affect      Additional Data:    Labs:  Results from last 7 days   Lab Units 06/22/24  1242   WBC Thousand/uL 64.82*   HEMOGLOBIN g/dL 9.6*   HEMATOCRIT % 31.4*   PLATELETS Thousands/uL 35*   BANDS PCT % 5   LYMPHO PCT % 2*   MONO PCT % 14*   EOS PCT % 0     Results from last 7 days   Lab Units 06/22/24  1242   SODIUM mmol/L 138   POTASSIUM mmol/L 4.6   CHLORIDE mmol/L 103   CO2 mmol/L 21   BUN mg/dL 39*   CREATININE mg/dL 1.53*   ANION GAP mmol/L 14*   CALCIUM mg/dL 10.0   ALBUMIN g/dL 3.7   TOTAL BILIRUBIN mg/dL 3.77*   ALK PHOS U/L 49   ALT U/L 10   AST U/L 28   GLUCOSE RANDOM mg/dL 117     Results from last 7 days   Lab Units 06/22/24  1242   INR   1.76*                   Lines/Drains:  Invasive Devices       Peripheral Intravenous Line  Duration             Peripheral IV 06/23/24 Dorsal (posterior);Right Forearm 2 days              Drain  Duration             Urethral Catheter 16 Fr. 3 days                  Urinary Catheter:  Goal for removal: N/A- Discharging with Peña               Imaging: No pertinent imaging reviewed.    Recent Cultures (last 7 days):         Last 24 Hours Medication List:   Current Facility-Administered Medications   Medication Dose Route Frequency Provider Last Rate    bisacodyl  10 mg Rectal Daily PRN Heidi Kwon MD      guaiFENesin  600 mg Oral Q12H EVON Heidi Kwon MD      haloperidol  1 mg Oral Q6H PRN Heidi Kwon MD      hyoscyamine  0.125 mg Oral Q4H PRN Heidi Kwon MD      LORazepam  0.5 mg Oral Q6H PRN Heidi Kwon MD      Morphine Sulfate (Concentrate)  5 mg Oral Q3H PRN Heidi Kwon MD      oxyCODONE  5 mg Oral Q4H PRN Heidi Kwon MD      Or    oxyCODONE  7.5 mg Oral Q4H PRN Heidi Kwon MD      prochlorperazine  10 mg Oral Q6H PRN Heidi Kwon MD      senna  8.6 mg Oral Daily Heidi Kwon MD          Today, Patient Was Seen By: Efe Garcia DO    **Please Note: This note may have been constructed using a voice recognition system.**

## 2024-06-26 NOTE — ASSESSMENT & PLAN NOTE
Multifactorial with history of pulmonary fibrosis, pulmonary hypertension, kyphosis, and the COVID-19 virus infection  Utilize supplemental oxygen as needed to maintain oxygen saturation levels at 90% and above and PRN comfort

## 2024-06-27 NOTE — ASSESSMENT & PLAN NOTE
"S/P mechanical fall  Comfort care measures only  I appreciate Neurosurgery's input:  \"Contacted by Dr. Garcia at [facility] in regard to this pt and if a cervical collar would be indicated.  He presented to the ER on 6/22/2024 after a witnessed fall at home.  He has been on home hospice but has had a more rapid decline in the last week after this fall.  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  Patient's family continue to maintain wishes for comfort measures only/hospice care.  It appears they brought him to the ER to help with coordinating inpatient hospice given the patient's decline and need for increased support/24-hour care.  Primary team messaged to inquire if a cervical collar would be indicated.     Given that the patient is comfort measures only/hospice and does not want to pursue any further surgical intervention or medical management, no strict cervical collar is required.  No further imaging, close neuromonitoring, or serial imaging with outpatient follow-up is required in regards to these injuries.     Patient may wear a soft collar for comfort should that be helpful to him.  Otherwise will defer further management to the primary team and ongoing coordination for inpatient hospice services.     Neurosurgery will sign off at this time.  Please reach out with any further questions or concerns.  As mentioned above, no further follow-up in this regard is required given CMO.\"   "

## 2024-06-27 NOTE — DEATH NOTE
INPATIENT DEATH NOTE  Greyson Cisneros 89 y.o. male MRN: [id number]  Unit/Bed#: 401-01 Encounter: [id number]    Date, Time and Cause of Death    Date of Death: 24  Time of Death:  9:40 PM  Preliminary Cause of Death: Acute respiratory failure with hypoxia (HCC)  Entered by: Malik Marin PA-C[[email or web address]]       Attribution       KB1.1 Malik Marin PA-C 24 21:46             Patient's Information  Pronounced by: Malik Marin PA-C  Did the patient's death occur in the ED?: No  Did the patient's death occur in the OR?: No  Did the patient's death occur less than 10 days post-op?: No  Did the patient's death occur within 24 hours of admission?: No  Was code status DNR at the time of death?: Yes    PHYSICAL EXAM:  Unresponsive to noxious stimuli, Spontaneous respirations absent, Breath sounds absent, Carotid pulse absent, Heart sounds absent, Pupillary light reflex absent, and Corneal blink reflex absent    Medical Examiner notification criteria:  NONE APPLICABLE   Medical Examiner's office notified?:  No, does not meet ME notification criteria   Medical Examiner accepted case?:  No  Name of Medical Examiner: NA    Family Notification  Was the family notified?: Yes  Date Notified: 24  Time Notified:   Notified by: Malik Marin PA-C  Name of Family Notified of Death: Krysten Torres   Relationship to Patient: Daughter  Family Notification Route: Telephone  Was the family told to contact a  home?: Yes  Name of  [facility]    Autopsy Options:  Post-mortem examination declined by next of kin    Primary Service Attending Physician notified?:  yes - Attending:  Efe Garcia DO    Physician/Resident responsible for completing Discharge Summary:  Efe Garcia DO

## 2024-06-27 NOTE — DISCHARGE SUMMARY
"[facility]  Discharge- Greyson Cisneros 5/14/1928, 89 y.o. male MRN: [id number]  Unit/Bed#: 401-01 Encounter: [id number]  Primary Care Provider: Laila Diaz DO   Date and time admitted to hospital: 6/22/2024 12:25 PM    Subdural hematoma (HCC)  Assessment & Plan  S/P mechanical fall  Comfort care measures only  I appreciate Neurosurgery's input:  \"Contacted by Dr. Garcia at [facility] in regard to this pt and if a cervical collar would be indicated.  He presented to the ER on 6/22/2024 after a witnessed fall at home.  He has been on home hospice but has had a more rapid decline in the last week after this fall.  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  Patient's family continue to maintain wishes for comfort measures only/hospice care.  It appears they brought him to the ER to help with coordinating inpatient hospice given the patient's decline and need for increased support/24-hour care.  Primary team messaged to inquire if a cervical collar would be indicated.     Given that the patient is comfort measures only/hospice and does not want to pursue any further surgical intervention or medical management, no strict cervical collar is required.  No further imaging, close neuromonitoring, or serial imaging with outpatient follow-up is required in regards to these injuries.     Patient may wear a soft collar for comfort should that be helpful to him.  Otherwise will defer further management to the primary team and ongoing coordination for inpatient hospice services.     Neurosurgery will sign off at this time.  Please reach out with any further questions or concerns.  As mentioned above, no further follow-up in this regard is required given CMO.\"     * Comfort measures only status  Assessment & Plan  This is a 89-year-old male patient who already on hospice care at home, history of pulmonary fibrosis, CHF, BPH, pulmonary " "hypertension who presents status-post what appears to be a mechanical fall at home.  Per his family, the patient has been noted for decline over the past week, previously only had hospice visits weekly.    Following the fall patient was found to have subdural hematoma and a C7 fracture/spinal cord injury.    The patient will require increased supports/24-hour care upon discharge.  Family is hoping that they would be able to find staffing to support the patient's needs at home.  Admitted under comfort care measures  Continue symptom management approach with pain medications including oxycodone and morphine as well as Ativan.  The patient was also noted for urinary retention for which a Peña catheter was placed.  Case management input appreciated for discharge planning  Plan to discharge to [facility] 5th floor SNF for comfort care measures only    COVID-19 virus infection  Assessment & Plan  Comfort care measures only    Closed fracture of seventh cervical vertebra (HCC)  Assessment & Plan  S/P mechanical fall  Comfort care measures only  I appreciate Neurosurgery's input:  \"Contacted by Dr. Garcia at [facility] in regard to this pt and if a cervical collar would be indicated.  He presented to the ER on 6/22/2024 after a witnessed fall at home.  He has been on home hospice but has had a more rapid decline in the last week after this fall.  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  Patient's family continue to maintain wishes for comfort measures only/hospice care.  It appears they brought him to the ER to help with coordinating inpatient hospice given the patient's decline and need for increased support/24-hour care.  Primary team messaged to inquire if a cervical collar would be indicated.     Given that the patient is comfort measures only/hospice and does not want to pursue any further surgical intervention or " "medical management, no strict cervical collar is required.  No further imaging, close neuromonitoring, or serial imaging with outpatient follow-up is required in regards to these injuries.     Patient may wear a soft collar for comfort should that be helpful to him.  Otherwise will defer further management to the primary team and ongoing coordination for inpatient hospice services.     Neurosurgery will sign off at this time.  Please reach out with any further questions or concerns.  As mentioned above, no further follow-up in this regard is required given CMO.\"       Hypoxia  Assessment & Plan  Multifactorial with history of pulmonary fibrosis, pulmonary hypertension, kyphosis, and the COVID-19 virus infection  Utilize supplemental oxygen as needed to maintain oxygen saturation levels at 90% and above and PRN comfort    Acute on chronic diastolic heart failure (HCC)  Assessment & Plan  Wt Readings from Last 3 Encounters:   06/22/24 59.5 kg (131 lb 3.2 oz)   04/30/24 66.2 kg (146 lb)   04/08/24 66.2 kg (146 lb)     Diuretics can be used PRN for comfort  Comfort care measures only      Leukocytosis  Assessment & Plan  Likely a leukemoid reaction  Comfort care measures only    Ectasia of aorta  Assessment & Plan  Ectasia of the ascending aorta, 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 Recommended follow-up in 12 months.   Findings noted, however limited clinical significance given that the patient is on comfort care measures only    Esophageal thickening  Assessment & Plan  New finding on CT  Does not report GI symptoms  Comfort care measures only          Medical Problems       Resolved Problems  Date Reviewed: 6/26/2024   None       Discharging Physician / Practitioner: Malik Marin PA-C  PCP: Laila Diaz DO  Admission Date:   Admission Orders (From admission, onward)       Ordered        06/22/24 1510  INPATIENT ADMISSION  Once                          Discharge Date: 06/26/24    Consultations During Hospital Stay:  Neurosurgery    Procedures " Performed:   None    Significant Findings / Test Results:   Small subdural hematoma, C7 fracture    Incidental Findings:   None   None    Test Results Pending at Discharge (will require follow up):   None     Outpatient Tests Requested:  None    Complications:  none    Reason for Admission: Mechanical fall resulting in a subdural hematoma and fracture of the C7 anterior osteophyte    Hospital Course:   Greyson Cisneros is a 89 y.o. male patient who originally presented to the hospital on 2024 after experiencing a mechanical fall at home.  Neurosurgery consulted on this admission regarding need for cervical collar as well as need for any surgical intervention medical management of his subdural hematoma and C7 fracture.  It was determined that no further neurosurgery intervention is needed given plan for patient to be placed on comfort measures only. Patient was subsequently placed on comfort care after discussion with family.     Symptom management with pain medication including oxycodone and morphine as well as Ativan ordered.  Patient was also noted to have urinary retention for which a Peña catheter was placed.    Plan to have patient discharge to [facility] fifth floor skilled nursing [facility] to continue comfort care measures However patient passed at 2140 hours. Patient's family was notified via phone. Patient's daughter declined performing autopsy.       Please see above list of diagnoses and related plan for additional information.     Condition at Discharge:      Discharge Day Visit / Exam:   * Please refer to separate progress note for these details *    Discussion with Family: Updated  (daughter) via phone.    Discharge instructions/Information to patient and family:   See after visit summary for information provided to patient and family.      Provisions for Follow-Up Care:  See after visit summary for information related to follow-up care and any pertinent home health  orders.      Mobility at time of Discharge:   Basic Mobility Inpatient Raw Score: 10  -[facility] Goal: 4: Move to chair/commode  -HL Achieved: 2: Bed activities/Dependent transfer  Patient      Disposition:   Other:     Planned Readmission: None     Discharge Statement:  I spent 40 minutes discharging the patient. This time was spent on the day of discharge. I had direct contact with the patient on the day of discharge. Greater than 50% of the total time was spent examining patient, answering all patient questions, arranging and discussing plan of care with patient as well as directly providing post-discharge instructions.  Additional time then spent on discharge activities.    Discharge Medications:  See after visit summary for reconciled discharge medications provided to patient and/or family.      **Please Note: This note may have been constructed using a voice recognition system**

## 2024-06-27 NOTE — ASSESSMENT & PLAN NOTE
This is a 89-year-old male patient who already on hospice care at home, history of pulmonary fibrosis, CHF, BPH, pulmonary hypertension who presents status-post what appears to be a mechanical fall at home.  Per his family, the patient has been noted for decline over the past week, previously only had hospice visits weekly.    Following the fall patient was found to have subdural hematoma and a C7 fracture/spinal cord injury.    The patient will require increased supports/24-hour care upon discharge.  Family is hoping that they would be able to find staffing to support the patient's needs at home.  Admitted under comfort care measures  Continue symptom management approach with pain medications including oxycodone and morphine as well as Ativan.  The patient was also noted for urinary retention for which a Peña catheter was placed.  Case management input appreciated for discharge planning  Plan to discharge to [facility] 5th floor SNF for comfort care measures only

## 2024-06-27 NOTE — ASSESSMENT & PLAN NOTE
"S/P mechanical fall  Comfort care measures only  I appreciate Neurosurgery's input:  \"Contacted by Dr. Garcia at [facility] in regard to this pt and if a cervical collar would be indicated.  He presented to the ER on 6/22/2024 after a witnessed fall at home.  He has been on home hospice but has had a more rapid decline in the last week after this fall.  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  Patient's family continue to maintain wishes for comfort measures only/hospice care.  It appears they brought him to the ER to help with coordinating inpatient hospice given the patient's decline and need for increased support/24-hour care.  Primary team messaged to inquire if a cervical collar would be indicated.     Given that the patient is comfort measures only/hospice and does not want to pursue any further surgical intervention or medical management, no strict cervical collar is required.  No further imaging, close neuromonitoring, or serial imaging with outpatient follow-up is required in regards to these injuries.     Patient may wear a soft collar for comfort should that be helpful to him.  Otherwise will defer further management to the primary team and ongoing coordination for inpatient hospice services.     Neurosurgery will sign off at this time.  Please reach out with any further questions or concerns.  As mentioned above, no further follow-up in this regard is required given CMO.\"     "